# Patient Record
Sex: FEMALE | Race: WHITE | NOT HISPANIC OR LATINO | Employment: FULL TIME | URBAN - METROPOLITAN AREA
[De-identification: names, ages, dates, MRNs, and addresses within clinical notes are randomized per-mention and may not be internally consistent; named-entity substitution may affect disease eponyms.]

---

## 2018-07-19 ENCOUNTER — HOSPITAL ENCOUNTER (OUTPATIENT)
Dept: GENERAL RADIOLOGY | Facility: HOSPITAL | Age: 63
Discharge: HOME | End: 2018-07-19

## 2018-07-19 ENCOUNTER — HOSPITAL ENCOUNTER (OUTPATIENT)
Dept: GENERAL RADIOLOGY | Facility: HOSPITAL | Age: 63
Discharge: HOME | End: 2018-07-19
Attending: PAIN MEDICINE | Admitting: PAIN MEDICINE

## 2018-07-19 DIAGNOSIS — R52 PAIN: ICD-10-CM

## 2018-07-19 DIAGNOSIS — M54.5 LOW BACK PAIN, UNSPECIFIED BACK PAIN LATERALITY, UNSPECIFIED CHRONICITY, WITH SCIATICA PRESENCE UNSPECIFIED: ICD-10-CM

## 2018-07-19 PROCEDURE — 72220 X-RAY EXAM SACRUM TAILBONE: CPT

## 2018-07-19 PROCEDURE — 72114 X-RAY EXAM L-S SPINE BENDING: CPT

## 2021-03-15 ENCOUNTER — IMMUNIZATION (OUTPATIENT)
Dept: VACCINE CLINIC | Facility: HOSPITAL | Age: 66
End: 2021-03-15

## 2021-03-15 PROCEDURE — 91300 HC SARSCOV02 VAC 30MCG/0.3ML IM: CPT | Performed by: INTERNAL MEDICINE

## 2021-03-15 PROCEDURE — 0001A: CPT | Performed by: INTERNAL MEDICINE

## 2021-04-05 ENCOUNTER — IMMUNIZATION (OUTPATIENT)
Dept: VACCINE CLINIC | Facility: HOSPITAL | Age: 66
End: 2021-04-05

## 2021-04-05 PROCEDURE — 0002A: CPT | Performed by: INTERNAL MEDICINE

## 2021-04-05 PROCEDURE — 91300 HC SARSCOV02 VAC 30MCG/0.3ML IM: CPT | Performed by: INTERNAL MEDICINE

## 2025-02-04 ENCOUNTER — APPOINTMENT (OUTPATIENT)
Dept: CT IMAGING | Facility: HOSPITAL | Age: 70
DRG: 644 | End: 2025-02-04
Payer: MEDICARE

## 2025-02-04 ENCOUNTER — APPOINTMENT (OUTPATIENT)
Dept: GENERAL RADIOLOGY | Facility: HOSPITAL | Age: 70
DRG: 644 | End: 2025-02-04
Payer: MEDICARE

## 2025-02-04 ENCOUNTER — HOSPITAL ENCOUNTER (INPATIENT)
Facility: HOSPITAL | Age: 70
LOS: 7 days | Discharge: NURSING, REHAB OR LONG-TERM CARE | DRG: 644 | End: 2025-02-11
Attending: EMERGENCY MEDICINE | Admitting: STUDENT IN AN ORGANIZED HEALTH CARE EDUCATION/TRAINING PROGRAM
Payer: MEDICARE

## 2025-02-04 DIAGNOSIS — E03.9 HYPOTHYROIDISM, UNSPECIFIED TYPE: ICD-10-CM

## 2025-02-04 DIAGNOSIS — N17.9 AKI (ACUTE KIDNEY INJURY): Primary | ICD-10-CM

## 2025-02-04 DIAGNOSIS — I70.1 RENAL ARTERY STENOSIS: ICD-10-CM

## 2025-02-04 DIAGNOSIS — M79.89 LEG SWELLING: ICD-10-CM

## 2025-02-04 DIAGNOSIS — I73.9 PERIPHERAL ARTERIAL DISEASE: ICD-10-CM

## 2025-02-04 DIAGNOSIS — I10 HYPERTENSION, UNSPECIFIED TYPE: ICD-10-CM

## 2025-02-04 PROBLEM — R53.1 WEAKNESS: Status: ACTIVE | Noted: 2025-02-04

## 2025-02-04 PROBLEM — R60.0 LOWER EXTREMITY EDEMA: Status: ACTIVE | Noted: 2025-02-04

## 2025-02-04 PROBLEM — R79.89 ELEVATED TROPONIN: Status: ACTIVE | Noted: 2025-02-04

## 2025-02-04 PROBLEM — E66.9 OBESITY (BMI 30-39.9): Status: ACTIVE | Noted: 2025-02-04

## 2025-02-04 LAB
ALBUMIN SERPL-MCNC: 4.5 G/DL (ref 3.5–5.2)
ALBUMIN/GLOB SERPL: 1.2 G/DL
ALP SERPL-CCNC: 81 U/L (ref 39–117)
ALT SERPL W P-5'-P-CCNC: 11 U/L (ref 1–33)
ANION GAP SERPL CALCULATED.3IONS-SCNC: 11.3 MMOL/L (ref 5–15)
AST SERPL-CCNC: 27 U/L (ref 1–32)
BACTERIA UR QL AUTO: ABNORMAL /HPF
BASOPHILS # BLD AUTO: 0.01 10*3/MM3 (ref 0–0.2)
BASOPHILS NFR BLD AUTO: 0.2 % (ref 0–1.5)
BILIRUB SERPL-MCNC: 0.9 MG/DL (ref 0–1.2)
BILIRUB UR QL STRIP: NEGATIVE
BUN SERPL-MCNC: 28 MG/DL (ref 8–23)
BUN/CREAT SERPL: 14.4 (ref 7–25)
CALCIUM SPEC-SCNC: 9.6 MG/DL (ref 8.6–10.5)
CHLORIDE SERPL-SCNC: 103 MMOL/L (ref 98–107)
CLARITY UR: CLEAR
CO2 SERPL-SCNC: 26.7 MMOL/L (ref 22–29)
COLOR UR: YELLOW
CREAT SERPL-MCNC: 1.94 MG/DL (ref 0.57–1)
DEPRECATED RDW RBC AUTO: 55.4 FL (ref 37–54)
EGFRCR SERPLBLD CKD-EPI 2021: 27.4 ML/MIN/1.73
EOSINOPHIL # BLD AUTO: 0.16 10*3/MM3 (ref 0–0.4)
EOSINOPHIL NFR BLD AUTO: 3.1 % (ref 0.3–6.2)
ERYTHROCYTE [DISTWIDTH] IN BLOOD BY AUTOMATED COUNT: 16 % (ref 12.3–15.4)
GEN 5 1HR TROPONIN T REFLEX: 67 NG/L
GLOBULIN UR ELPH-MCNC: 3.7 GM/DL
GLUCOSE SERPL-MCNC: 102 MG/DL (ref 65–99)
GLUCOSE UR STRIP-MCNC: NEGATIVE MG/DL
HCT VFR BLD AUTO: 38.6 % (ref 34–46.6)
HGB BLD-MCNC: 12.1 G/DL (ref 12–15.9)
HGB UR QL STRIP.AUTO: ABNORMAL
HOLD SPECIMEN: NORMAL
HOLD SPECIMEN: NORMAL
HYALINE CASTS UR QL AUTO: ABNORMAL /LPF
IMM GRANULOCYTES # BLD AUTO: 0.04 10*3/MM3 (ref 0–0.05)
IMM GRANULOCYTES NFR BLD AUTO: 0.8 % (ref 0–0.5)
KETONES UR QL STRIP: NEGATIVE
LEUKOCYTE ESTERASE UR QL STRIP.AUTO: ABNORMAL
LYMPHOCYTES # BLD AUTO: 1.36 10*3/MM3 (ref 0.7–3.1)
LYMPHOCYTES NFR BLD AUTO: 26.6 % (ref 19.6–45.3)
MAGNESIUM SERPL-MCNC: 2.4 MG/DL (ref 1.6–2.4)
MCH RBC QN AUTO: 30 PG (ref 26.6–33)
MCHC RBC AUTO-ENTMCNC: 31.3 G/DL (ref 31.5–35.7)
MCV RBC AUTO: 95.5 FL (ref 79–97)
MONOCYTES # BLD AUTO: 0.27 10*3/MM3 (ref 0.1–0.9)
MONOCYTES NFR BLD AUTO: 5.3 % (ref 5–12)
NEUTROPHILS NFR BLD AUTO: 3.28 10*3/MM3 (ref 1.7–7)
NEUTROPHILS NFR BLD AUTO: 64 % (ref 42.7–76)
NITRITE UR QL STRIP: NEGATIVE
NRBC BLD AUTO-RTO: 0 /100 WBC (ref 0–0.2)
NT-PROBNP SERPL-MCNC: 261 PG/ML (ref 0–900)
PH UR STRIP.AUTO: 8 [PH] (ref 5–8)
PHOSPHATE SERPL-MCNC: 3 MG/DL (ref 2.5–4.5)
PLATELET # BLD AUTO: 205 10*3/MM3 (ref 140–450)
PMV BLD AUTO: 11.8 FL (ref 6–12)
POTASSIUM SERPL-SCNC: 4.6 MMOL/L (ref 3.5–5.2)
PROT SERPL-MCNC: 8.2 G/DL (ref 6–8.5)
PROT UR QL STRIP: ABNORMAL
QT INTERVAL: 449 MS
QTC INTERVAL: 455 MS
RBC # BLD AUTO: 4.04 10*6/MM3 (ref 3.77–5.28)
RBC # UR STRIP: ABNORMAL /HPF
REF LAB TEST METHOD: ABNORMAL
SODIUM SERPL-SCNC: 141 MMOL/L (ref 136–145)
SP GR UR STRIP: 1.01 (ref 1–1.03)
SQUAMOUS #/AREA URNS HPF: ABNORMAL /HPF
T4 FREE SERPL-MCNC: <0.1 NG/DL (ref 0.92–1.68)
TROPONIN T % DELTA: -13
TROPONIN T NUMERIC DELTA: -10 NG/L
TROPONIN T SERPL HS-MCNC: 77 NG/L
TSH SERPL DL<=0.05 MIU/L-ACNC: 92.6 UIU/ML (ref 0.27–4.2)
UROBILINOGEN UR QL STRIP: ABNORMAL
WBC # UR STRIP: ABNORMAL /HPF
WBC NRBC COR # BLD AUTO: 5.12 10*3/MM3 (ref 3.4–10.8)
WHOLE BLOOD HOLD COAG: NORMAL
WHOLE BLOOD HOLD SPECIMEN: NORMAL

## 2025-02-04 PROCEDURE — 81001 URINALYSIS AUTO W/SCOPE: CPT | Performed by: EMERGENCY MEDICINE

## 2025-02-04 PROCEDURE — 83880 ASSAY OF NATRIURETIC PEPTIDE: CPT | Performed by: EMERGENCY MEDICINE

## 2025-02-04 PROCEDURE — 80053 COMPREHEN METABOLIC PANEL: CPT | Performed by: EMERGENCY MEDICINE

## 2025-02-04 PROCEDURE — 36415 COLL VENOUS BLD VENIPUNCTURE: CPT

## 2025-02-04 PROCEDURE — 99285 EMERGENCY DEPT VISIT HI MDM: CPT

## 2025-02-04 PROCEDURE — 84443 ASSAY THYROID STIM HORMONE: CPT | Performed by: EMERGENCY MEDICINE

## 2025-02-04 PROCEDURE — P9612 CATHETERIZE FOR URINE SPEC: HCPCS

## 2025-02-04 PROCEDURE — 85025 COMPLETE CBC W/AUTO DIFF WBC: CPT | Performed by: EMERGENCY MEDICINE

## 2025-02-04 PROCEDURE — 25510000001 IOPAMIDOL PER 1 ML: Performed by: EMERGENCY MEDICINE

## 2025-02-04 PROCEDURE — 25010000002 HYDRALAZINE PER 20 MG: Performed by: EMERGENCY MEDICINE

## 2025-02-04 PROCEDURE — 71045 X-RAY EXAM CHEST 1 VIEW: CPT

## 2025-02-04 PROCEDURE — 93005 ELECTROCARDIOGRAM TRACING: CPT | Performed by: EMERGENCY MEDICINE

## 2025-02-04 PROCEDURE — 93010 ELECTROCARDIOGRAM REPORT: CPT | Performed by: INTERNAL MEDICINE

## 2025-02-04 PROCEDURE — 84100 ASSAY OF PHOSPHORUS: CPT | Performed by: EMERGENCY MEDICINE

## 2025-02-04 PROCEDURE — 84439 ASSAY OF FREE THYROXINE: CPT | Performed by: EMERGENCY MEDICINE

## 2025-02-04 PROCEDURE — 83735 ASSAY OF MAGNESIUM: CPT | Performed by: EMERGENCY MEDICINE

## 2025-02-04 PROCEDURE — 70450 CT HEAD/BRAIN W/O DYE: CPT

## 2025-02-04 PROCEDURE — 75635 CT ANGIO ABDOMINAL ARTERIES: CPT

## 2025-02-04 PROCEDURE — 84484 ASSAY OF TROPONIN QUANT: CPT | Performed by: EMERGENCY MEDICINE

## 2025-02-04 RX ORDER — LEVOTHYROXINE SODIUM 25 UG/1
TABLET ORAL
Status: CANCELLED | OUTPATIENT
Start: 2025-02-05

## 2025-02-04 RX ORDER — LEVOTHYROXINE SODIUM 50 UG/1
50 TABLET ORAL
Status: DISCONTINUED | OUTPATIENT
Start: 2025-02-04 | End: 2025-02-05

## 2025-02-04 RX ORDER — SODIUM CHLORIDE 9 MG/ML
40 INJECTION, SOLUTION INTRAVENOUS AS NEEDED
Status: DISCONTINUED | OUTPATIENT
Start: 2025-02-04 | End: 2025-02-11 | Stop reason: HOSPADM

## 2025-02-04 RX ORDER — ACETAMINOPHEN 650 MG/1
650 SUPPOSITORY RECTAL EVERY 4 HOURS PRN
Status: DISCONTINUED | OUTPATIENT
Start: 2025-02-04 | End: 2025-02-11 | Stop reason: HOSPADM

## 2025-02-04 RX ORDER — AMOXICILLIN 250 MG
2 CAPSULE ORAL 2 TIMES DAILY PRN
Status: DISCONTINUED | OUTPATIENT
Start: 2025-02-04 | End: 2025-02-09

## 2025-02-04 RX ORDER — BISACODYL 10 MG
10 SUPPOSITORY, RECTAL RECTAL DAILY PRN
Status: DISCONTINUED | OUTPATIENT
Start: 2025-02-04 | End: 2025-02-09

## 2025-02-04 RX ORDER — ACETAMINOPHEN 160 MG/5ML
650 SOLUTION ORAL EVERY 4 HOURS PRN
Status: DISCONTINUED | OUTPATIENT
Start: 2025-02-04 | End: 2025-02-11 | Stop reason: HOSPADM

## 2025-02-04 RX ORDER — BISACODYL 5 MG/1
5 TABLET, DELAYED RELEASE ORAL DAILY PRN
Status: DISCONTINUED | OUTPATIENT
Start: 2025-02-04 | End: 2025-02-09

## 2025-02-04 RX ORDER — IOPAMIDOL 755 MG/ML
100 INJECTION, SOLUTION INTRAVASCULAR
Status: COMPLETED | OUTPATIENT
Start: 2025-02-04 | End: 2025-02-04

## 2025-02-04 RX ORDER — ONDANSETRON 2 MG/ML
4 INJECTION INTRAMUSCULAR; INTRAVENOUS EVERY 6 HOURS PRN
Status: DISCONTINUED | OUTPATIENT
Start: 2025-02-04 | End: 2025-02-11 | Stop reason: HOSPADM

## 2025-02-04 RX ORDER — ONDANSETRON 4 MG/1
4 TABLET, ORALLY DISINTEGRATING ORAL EVERY 6 HOURS PRN
Status: DISCONTINUED | OUTPATIENT
Start: 2025-02-04 | End: 2025-02-11 | Stop reason: HOSPADM

## 2025-02-04 RX ORDER — HYDRALAZINE HYDROCHLORIDE 20 MG/ML
10 INJECTION INTRAMUSCULAR; INTRAVENOUS ONCE
Status: COMPLETED | OUTPATIENT
Start: 2025-02-04 | End: 2025-02-04

## 2025-02-04 RX ORDER — SODIUM CHLORIDE 0.9 % (FLUSH) 0.9 %
10 SYRINGE (ML) INJECTION EVERY 12 HOURS SCHEDULED
Status: DISCONTINUED | OUTPATIENT
Start: 2025-02-04 | End: 2025-02-11 | Stop reason: HOSPADM

## 2025-02-04 RX ORDER — POLYETHYLENE GLYCOL 3350 17 G/17G
17 POWDER, FOR SOLUTION ORAL DAILY PRN
Status: DISCONTINUED | OUTPATIENT
Start: 2025-02-04 | End: 2025-02-09

## 2025-02-04 RX ORDER — ACETAMINOPHEN 325 MG/1
650 TABLET ORAL EVERY 4 HOURS PRN
Status: DISCONTINUED | OUTPATIENT
Start: 2025-02-04 | End: 2025-02-11 | Stop reason: HOSPADM

## 2025-02-04 RX ORDER — SODIUM CHLORIDE 0.9 % (FLUSH) 0.9 %
10 SYRINGE (ML) INJECTION AS NEEDED
Status: DISCONTINUED | OUTPATIENT
Start: 2025-02-04 | End: 2025-02-11 | Stop reason: HOSPADM

## 2025-02-04 RX ADMIN — HYDRALAZINE HYDROCHLORIDE 10 MG: 20 INJECTION INTRAMUSCULAR; INTRAVENOUS at 15:31

## 2025-02-04 RX ADMIN — IOPAMIDOL 95 ML: 755 INJECTION, SOLUTION INTRAVENOUS at 14:58

## 2025-02-04 RX ADMIN — LEVOTHYROXINE SODIUM 50 MCG: 50 TABLET ORAL at 21:56

## 2025-02-04 NOTE — ED PROVIDER NOTES
EMERGENCY DEPARTMENT ENCOUNTER  Room Number:  13/13  PCP: Provider, No Known  Independent Historians: Patient and Family  Date of encounter:  2/4/2025  Patient Care Team:  Provider, No Known as PCP - General     HPI:  Chief Complaint: had concerns including Weakness - Generalized.     A complete HPI/ROS/PMH/PSH/SH/FH are unobtainable due to: None    Chronic or social conditions impacting patient care (Social Determinants of Health): None    Context: Rika Millan is a 69 y.o. female with no known medical history who presents to the ED c/o acute weakness and urinary incontinence.  Patient has had approximately 2 years of worsening urinary incontinence.  She states that it has been nearly constant dribble.  She has the urge to go.  She denies any dysuria or change in urinary character.  She is also had recurrent falls at home due to generalized weakness.  She is also had bilateral leg swelling that has been ongoing for 2 years.  Patient has not seen a doctor in several years and does not have a PCP.  She has no known or diagnosed medical problems.  She denies chest pain or shortness of breath.  She has normal p.o. intake at home.  No nausea, vomiting, diarrhea, abdominal pain, abdominal distention.  No leg pain noted.    Review of prior external notes (non-ED) -and- Review of prior external test results outside of this encounter: Extensive review of the EPIC system as well as [facility] reveals no prior visit notes and no prior diagnostic studies available for review.    PAST MEDICAL HISTORY  Active Ambulatory Problems     Diagnosis Date Noted    No Active Ambulatory Problems     Resolved Ambulatory Problems     Diagnosis Date Noted    No Resolved Ambulatory Problems     No Additional Past Medical History       PAST SURGICAL HISTORY  No past surgical history on file.    FAMILY HISTORY  No family history on file.    SOCIAL HISTORY  Social History     Socioeconomic History    Marital status:         ALLERGIES  Codeine    REVIEW OF SYSTEMS  Review of Systems  Included in HPI  All systems reviewed and negative except for those discussed in HPI.    PHYSICAL EXAM    I have reviewed the triage vital signs and nursing notes.    ED Triage Vitals   Temp Heart Rate Resp BP SpO2   02/04/25 1153 02/04/25 1153 02/04/25 1153 02/04/25 1211 02/04/25 1153   97.6 °F (36.4 °C) 67 18 (!) 188/106 96 %      Temp src Heart Rate Source Patient Position BP Location FiO2 (%)   02/04/25 1153 02/04/25 1153 -- 02/04/25 1211 --   Tympanic Monitor  Right arm        Physical Exam  Constitutional:       General: She is not in acute distress.     Appearance: Normal appearance. She is ill-appearing. She is not toxic-appearing.   HENT:      Head: Normocephalic and atraumatic.      Nose: Nose normal.      Mouth/Throat:      Mouth: Mucous membranes are dry.      Pharynx: Oropharynx is clear.   Eyes:      Extraocular Movements: Extraocular movements intact.      Conjunctiva/sclera: Conjunctivae normal.      Pupils: Pupils are equal, round, and reactive to light.   Cardiovascular:      Rate and Rhythm: Normal rate and regular rhythm.      Pulses: Normal pulses.      Heart sounds: Normal heart sounds. No murmur heard.     No friction rub. No gallop.   Pulmonary:      Effort: Pulmonary effort is normal. No respiratory distress.      Breath sounds: Normal breath sounds. No stridor. No wheezing, rhonchi or rales.   Abdominal:      General: Abdomen is flat. There is no distension.      Palpations: Abdomen is soft.      Tenderness: There is no abdominal tenderness. There is no guarding or rebound.   Musculoskeletal:      Cervical back: Normal range of motion and neck supple.      Right lower leg: Edema present.      Left lower leg: Edema present.      Comments: Plus pitting edema to the distal thigh bilaterally, there is some chronic erythema without induration, no palpable fluctuance, some blistering of the skin without spontaneous rupture, left  foot is significantly cooler than the right, unable to Doppler bilateral PT pulses but and able to Doppler DP pulses   Skin:     General: Skin is warm and dry.   Neurological:      General: No focal deficit present.      Mental Status: She is alert and oriented to person, place, and time. Mental status is at baseline.      Comments: EOMI, no facial droop, normal sensation to both sides of face, no dysarthria, no aphasia, 5/5 strength in upper extremities, normal sensation bilateral upper extremities, 5/5 strength in bilateral lower extremities, normal sensation bilateral lower extremities, normal coordination   Psychiatric:         Mood and Affect: Mood normal.         Behavior: Behavior normal.         Thought Content: Thought content normal.         Judgment: Judgment normal.         LAB RESULTS  Recent Results (from the past 24 hours)   Green Top (Gel)    Collection Time: 02/04/25 12:18 PM   Result Value Ref Range    Extra Tube Hold for add-ons.    Lavender Top    Collection Time: 02/04/25 12:18 PM   Result Value Ref Range    Extra Tube hold for add-on    Gold Top - SST    Collection Time: 02/04/25 12:18 PM   Result Value Ref Range    Extra Tube Hold for add-ons.    Light Blue Top    Collection Time: 02/04/25 12:18 PM   Result Value Ref Range    Extra Tube Hold for add-ons.        RADIOLOGY  No Radiology Exams Resulted Within Past 24 Hours    MEDICATIONS GIVEN IN ER  Medications - No data to display    ORDERS PLACED DURING THIS VISIT:  Orders Placed This Encounter   Procedures    XR Chest 1 View    CT Head Without Contrast    CT Angio Abdominal Aorta Bilateral Iliofem Runoff    [place] Draw    Comprehensive Metabolic Panel    BNP    High Sensitivity Troponin T    Magnesium    Phosphorus    TSH Rfx On Abnormal To Free T4    Urinalysis With Microscopic If Indicated (No Culture) - Urine, Catheter    ECG 12 Lead Altered Mental Status    Green Top (Gel)    Lavender Top    Gold Top - SST    Light Blue Top    CBC &  Differential       OUTPATIENT MEDICATION MANAGEMENT:  No current Epic-ordered facility-administered medications on file.     No current Epic-ordered outpatient medications on file.       PROCEDURES  Procedures    PROGRESS, DATA ANALYSIS, CONSULTS, AND MEDICAL DECISION MAKING  All labs have been independently interpreted by me.  All radiology studies have been reviewed by me. All EKG's have been independently viewed and interpreted by me.  Discussion below represents my analysis of pertinent findings related to patient's condition, differential diagnosis, treatment plan and final disposition.    Differential diagnosis includes but is not limited to renal failure, CHF, UTI, peripheral vascular disease, electrolyte derangement.    Clinical Scores:                   ED Course as of 02/05/25 1016   Tue Feb 04, 2025   1408 EKG interpreted by myself  Time: 1406  Rate: 62  Rhythm: NSR  No ST elevation or depression  Normal intervals  Normal morphology [AB]   1420 Urinalysis With Microscopic If Indicated (No Culture) - Urine, Catheter(!) [AB]   1420 Creatinine(!): 1.94 [AB]   1420 XR Chest 1 View  My independent interpretation of the imaging study is no pulmonary edema [AB]   1439 HS Troponin T(!!): 77 [AB]   1439 TSH Baseline(!): 92.600 [AB]   1504 CT Head Without Contrast  My independent interpretation of the imaging study is no ICH [AB]   1505 Care transferred to NIDHI Gallardo, pending completion of workup and admission. [AB]   1528 I discussed patient with Dr. Alfred, radiologist.  CT head demonstrates diffuse cerebral atrophy, significant small vessel disease, some ventricular enlargement likely consistent with atrophy and not NPH [KA]   1623 I discussed patient with Dr. Cordon, hospitalist.  Discussed patient's history presentation workup and she agrees to admit for further evaluation treatment [KA]   1630 I reassessed the patient, she is in no distress.  With her permission I counseled her and her son at the bedside  and all of her lab and imaging results and importance of admission for further evaluation and treatment and she is agreeable. [KA]   1730 HS Troponin T(!!): 67 [KA]      ED Course User Index  [AB] Sae Vizcaino MD  [KA] Falguni Marie PA-C             AS OF 13:48 EST VITALS:    BP - (!) 188/106  HR - 67  TEMP - 97.6 °F (36.4 °C) (Tympanic)  O2 SATS - 96%    COMPLEXITY OF CARE  The patient requires admission.      DIAGNOSIS  Final diagnoses:   JUSTIN (acute kidney injury)   Leg swelling   Hypertension, unspecified type   Hypothyroidism, unspecified type   Peripheral arterial disease   Renal artery stenosis         DISPOSITION  ED Disposition       ED Disposition   Intended Admit    Condition   --    Comment   --                Please note that portions of this document were completed with a voice recognition program.    Note Disclaimer: At [facility], we believe that sharing information builds trust and better relationships. You are receiving this note because you recently visited [facility]. It is possible you will see health information before a provider has talked with you about it. This kind of information can be easy to misunderstand. To help you fully understand what it means for your health, we urge you to discuss this note with your provider.         Sae Vizcaino MD  02/05/25 1012

## 2025-02-04 NOTE — ED NOTES
Pt to ed for multiple complaints (urinary incont, generalized weakness). Pt does not take any medications, pt does not have a PCP and has not been seen by a provider in years. Pt alert and oriented x 4. Pt stated she has to use a wheelchair due to weakness in legs.

## 2025-02-04 NOTE — ED NOTES
Nursing report ED to floor  Rika Millan  69 y.o.  female    HPI :  HPI  Stated Reason for Visit: generalized weakness and urinary incont x several months  History Obtained From: patient  Duration (Days):  (several months)    Chief Complaint  Chief Complaint   Patient presents with    Weakness - Generalized       Admitting doctor:   Pamela Cordon MD    Admitting diagnosis:   The primary encounter diagnosis was JUSTIN (acute kidney injury). Diagnoses of Leg swelling, Hypertension, unspecified type, Hypothyroidism, unspecified type, Peripheral arterial disease, and Renal artery stenosis were also pertinent to this visit.    Code status:   Current Code Status       Date Active Code Status Order ID Comments User Context       Not on file            Allergies:   Codeine    Isolation:   No active isolations    Intake and Output    Intake/Output Summary (Last 24 hours) at 2/4/2025 1655  Last data filed at 2/4/2025 1619  Gross per 24 hour   Intake --   Output 1400 ml   Net -1400 ml       Weight:   There were no vitals filed for this visit.    Most recent vitals:   Vitals:    02/04/25 1431 02/04/25 1508 02/04/25 1516 02/04/25 1601   BP: 108/47 (!) 195/95 (!) 190/109 (!) 182/107   BP Location:       Pulse: 57 62 60 58   Resp:  18 18 18   Temp:       TempSrc:       SpO2: 93% 99% 100% 99%       Active LDAs/IV Access:   Lines, Drains & Airways       Active LDAs       Name Placement date Placement time Site Days    Peripheral IV 02/04/25 1346 Right Antecubital 02/04/25  1346  Antecubital  less than 1    External Urinary Catheter 02/04/25  1354  --  less than 1                    Labs (abnormal labs have a star):   Labs Reviewed   COMPREHENSIVE METABOLIC PANEL - Abnormal; Notable for the following components:       Result Value    Glucose 102 (*)     BUN 28 (*)     Creatinine 1.94 (*)     eGFR 27.4 (*)     All other components within normal limits    Narrative:     GFR Categories in Chronic Kidney Disease (CKD)      GFR  Category          GFR (mL/min/1.73)    Interpretation  G1                     90 or greater         Normal or high (1)  G2                      60-89                Mild decrease (1)  G3a                   45-59                Mild to moderate decrease  G3b                   30-44                Moderate to severe decrease  G4                    15-29                Severe decrease  G5                    14 or less           Kidney failure          (1)In the absence of evidence of kidney disease, neither GFR category G1 or G2 fulfill the criteria for CKD.    eGFR calculation 2021 CKD-EPI creatinine equation, which does not include race as a factor   TROPONIN - Abnormal; Notable for the following components:    HS Troponin T 77 (*)     All other components within normal limits    Narrative:     High Sensitive Troponin T Reference Range:  <14.0 ng/L- Negative Female for AMI  <22.0 ng/L- Negative Male for AMI  >=14 - Abnormal Female indicating possible myocardial injury.  >=22 - Abnormal Male indicating possible myocardial injury.   Clinicians would have to utilize clinical acumen, EKG, Troponin, and serial changes to determine if it is an Acute Myocardial Infarction or myocardial injury due to an underlying chronic condition.        TSH RFX ON ABNORMAL TO FREE T4 - Abnormal; Notable for the following components:    TSH 92.600 (*)     All other components within normal limits   URINALYSIS W/ MICROSCOPIC IF INDICATED (NO CULTURE) - Abnormal; Notable for the following components:    Blood, UA Small (1+) (*)     Protein, UA 30 mg/dL (1+) (*)     Leuk Esterase, UA Small (1+) (*)     All other components within normal limits   CBC WITH AUTO DIFFERENTIAL - Abnormal; Notable for the following components:    MCHC 31.3 (*)     RDW 16.0 (*)     RDW-SD 55.4 (*)     Immature Grans % 0.8 (*)     All other components within normal limits   URINALYSIS, MICROSCOPIC ONLY - Abnormal; Notable for the following components:    WBC, UA  11-20 (*)     Bacteria, UA Trace (*)     All other components within normal limits   T4, FREE - Abnormal; Notable for the following components:    Free T4 <0.10 (*)     All other components within normal limits   HIGH SENSITIVITIY TROPONIN T 1HR - Abnormal; Notable for the following components:    HS Troponin T 67 (*)     All other components within normal limits    Narrative:     High Sensitive Troponin T Reference Range:  <14.0 ng/L- Negative Female for AMI  <22.0 ng/L- Negative Male for AMI  >=14 - Abnormal Female indicating possible myocardial injury.  >=22 - Abnormal Male indicating possible myocardial injury.   Clinicians would have to utilize clinical acumen, EKG, Troponin, and serial changes to determine if it is an Acute Myocardial Infarction or myocardial injury due to an underlying chronic condition.        BNP (IN-HOUSE) - Normal    Narrative:     This assay is used as an aid in the diagnosis of individuals suspected of having heart failure. It can be used as an aid in the diagnosis of acute decompensated heart failure (ADHF) in patients presenting with signs and symptoms of ADHF to the emergency department (ED). In addition, NT-proBNP of <300 pg/mL indicates ADHF is not likely.    Age Range Result Interpretation  NT-proBNP Concentration (pg/mL:      <50             Positive            >450                   Gray                 300-450                    Negative             <300    50-75           Positive            >900                  Gray                300-900                  Negative            <300      >75             Positive            >1800                  Gray                300-1800                  Negative            <300   MAGNESIUM - Normal   PHOSPHORUS - Normal   RAINBOW DRAW    Narrative:     The following orders were created for panel order [place] Draw.  Procedure                               Abnormality         Status                     ---------                                -----------         ------                     Green Top (Gel)[[id number]]                                  Final result               Lavender Top[[id number]]                                     Final result               Gold Top - SST[[id number]]                                   Final result               Light Blue Top[[id number]]                                   Final result                 Please view results for these tests on the individual orders.   GREEN TOP   LAVENDER TOP   GOLD TOP - SST   LIGHT BLUE TOP   CBC AND DIFFERENTIAL    Narrative:     The following orders were created for panel order CBC & Differential.  Procedure                               Abnormality         Status                     ---------                               -----------         ------                     CBC Auto Differential[[id number]]        Abnormal            Final result                 Please view results for these tests on the individual orders.       EKG:   ECG 12 Lead Altered Mental Status   Preliminary Result   HEART RATE=62  bpm   RR Yhxvzhzl=257  ms   MT Llytahnp=946  ms   P Horizontal Axis=47  deg   P Front Axis=1  deg   QRSD Interval=81  ms   QT Hqtxvogx=216  ms   ENhX=549  ms   QRS Axis=20  deg   T Wave Axis=62  deg   - BORDERLINE ECG -   Sinus rhythm   Borderline T wave abnormalities   Date and Time of Study:2025-02-04 14:06:25          Meds given in ED:   Medications   iopamidol (ISOVUE-370) 76 % injection 100 mL (95 mL Intravenous Given by Other 2/4/25 6851)   hydrALAZINE (APRESOLINE) injection 10 mg (10 mg Intravenous Given 2/4/25 1531)       Imaging results:  CT Head Without Contrast    Result Date: 2/4/2025  1. No convincing acute intracranial abnormality is identified.  2. There is extensive confluent low-density throughout the cerebral white matter that is most probably a manifestation of severe small vessel disease. There is diffuse cerebral atrophy. The lateral and third ventricles are prominent in  size and this is most probably on the basis of central volume loss or atrophy rather than NPH and correlate clinically. There are lens implants in the globes from previous bilateral cataract surgery. The remainder of the head CT is normal.  Radiation dose reduction techniques were utilized, including automated exposure control and exposure modulation based on body size.       CT Angio Abdominal Aorta Bilateral Iliofem Runoff    Result Date: 2/4/2025  1. There is no significant aortoiliac inflow stenosis 2. There is severe stenoses within the right popliteal artery. Dominant runoff on the right is supplied by the posterior tibial artery 3. Moderate stenosis of the mid left popliteal artery. Dominant runoff on the left supplied by the peroneal artery 4. Severe stenosis at the origin of the right renal artery 5. Additional findings as described    Radiation dose reduction techniques were utilized, including automated exposure control and exposure modulation based on body size.   This report was finalized on 2/4/2025 3:28 PM by Dr. Nate Gamble M.D on Workstation: [id number]      XR Chest 1 View    Result Date: 2/4/2025  1. Mild cardiomegaly.   This report was finalized on 2/4/2025 2:23 PM by Dr. Ector Monahan M.D on Workstation: [id number]       Ambulatory status:   - assist    Social issues:   Social History     Socioeconomic History    Marital status:    Substance and Sexual Activity    Alcohol use: Not Currently    Drug use: Never       Peripheral Neurovascular  Peripheral Neurovascular (Adult)  Peripheral Neurovascular WDL: WDL  Additional Documentation: Edema (Group)  Edema  Edema: leg, left, leg, right, foot, left, foot, right  Leg, Left Edema: 4+ (Severe)  Leg, Right Edema: 4+ (Severe)  Foot, Left Edema: 4+ (Severe)  Foot, Right Edema: 4+ (Severe)    Neuro Cognitive  Neuro Cognitive (Adult)  Cognitive/Neuro/Behavioral WDL: WDL    Learning  Learning Assessment  Learning Readiness and Ability:  psychosocial barrier noted, physical limitation noted    Respiratory  Respiratory  Airway WDL: WDL  Respiratory WDL  Respiratory WDL: WDL    Abdominal Pain       Pain Assessments  Pain (Adult)  (0-10) Pain Rating: Rest: 0  (0-10) Pain Rating: Activity: 0    NIH Stroke Scale       Aretha Wilson RN  02/04/25 16:55 EST

## 2025-02-04 NOTE — PROGRESS NOTES
Clinical Pharmacy Services: Medication History    Rika Millan is a 69 y.o. female presenting to [facility] for   Chief Complaint   Patient presents with    Weakness - Generalized       She  has no past medical history on file.    Allergies as of 02/04/2025 - Reviewed 02/04/2025   Allergen Reaction Noted    Codeine Nausea Only 02/04/2025       Medication information was obtained from: Patient   Pharmacy and Phone Number:     Prior to Admission Medications       None              Medication notes:     This medication list is complete to the best of my knowledge as of 2/4/2025    Please call if questions.    Luis Whitman  Medication History Technician  [phone]    2/4/2025 15:03 EST

## 2025-02-05 ENCOUNTER — APPOINTMENT (OUTPATIENT)
Dept: CARDIOLOGY | Facility: HOSPITAL | Age: 70
DRG: 644 | End: 2025-02-05
Payer: MEDICARE

## 2025-02-05 LAB
ANION GAP SERPL CALCULATED.3IONS-SCNC: 7.8 MMOL/L (ref 5–15)
AORTIC DIMENSIONLESS INDEX: 0.74 (DI)
ASCENDING AORTA: 3 CM
AV MEAN PRESS GRAD SYS DOP V1V2: 3 MMHG
AV VMAX SYS DOP: 117 CM/SEC
BASOPHILS # BLD AUTO: 0.01 10*3/MM3 (ref 0–0.2)
BASOPHILS NFR BLD AUTO: 0.2 % (ref 0–1.5)
BH CV ECHO MEAS - AO MAX PG: 5.5 MMHG
BH CV ECHO MEAS - AO ROOT DIAM: 3.2 CM
BH CV ECHO MEAS - AO V2 VTI: 25.4 CM
BH CV ECHO MEAS - AVA(I,D): 2.5 CM2
BH CV ECHO MEAS - EDV(CUBED): 98 ML
BH CV ECHO MEAS - EDV(MOD-SP2): 80 ML
BH CV ECHO MEAS - EDV(MOD-SP4): 102 ML
BH CV ECHO MEAS - EF(MOD-SP2): 63.7 %
BH CV ECHO MEAS - EF(MOD-SP4): 61.8 %
BH CV ECHO MEAS - ESV(CUBED): 34.7 ML
BH CV ECHO MEAS - ESV(MOD-SP2): 29 ML
BH CV ECHO MEAS - ESV(MOD-SP4): 39 ML
BH CV ECHO MEAS - FS: 29.2 %
BH CV ECHO MEAS - IVS/LVPW: 0.98 CM
BH CV ECHO MEAS - IVSD: 1.4 CM
BH CV ECHO MEAS - LAT PEAK E' VEL: 4.7 CM/SEC
BH CV ECHO MEAS - LV MASS(C)D: 263.1 GRAMS
BH CV ECHO MEAS - LV MAX PG: 3.3 MMHG
BH CV ECHO MEAS - LV MEAN PG: 2 MMHG
BH CV ECHO MEAS - LV V1 MAX: 90.8 CM/SEC
BH CV ECHO MEAS - LV V1 VTI: 18.8 CM
BH CV ECHO MEAS - LVIDD: 4.6 CM
BH CV ECHO MEAS - LVIDS: 3.3 CM
BH CV ECHO MEAS - LVOT AREA: 3.4 CM2
BH CV ECHO MEAS - LVOT DIAM: 2.08 CM
BH CV ECHO MEAS - LVPWD: 1.44 CM
BH CV ECHO MEAS - MED PEAK E' VEL: 5 CM/SEC
BH CV ECHO MEAS - MV A DUR: 0.14 SEC
BH CV ECHO MEAS - MV A MAX VEL: 81.4 CM/SEC
BH CV ECHO MEAS - MV DEC SLOPE: 142.1 CM/SEC2
BH CV ECHO MEAS - MV DEC TIME: 0.3 SEC
BH CV ECHO MEAS - MV E MAX VEL: 57 CM/SEC
BH CV ECHO MEAS - MV E/A: 0.7
BH CV ECHO MEAS - MV MAX PG: 2.8 MMHG
BH CV ECHO MEAS - MV MEAN PG: 0.82 MMHG
BH CV ECHO MEAS - MV P1/2T: 144.4 MSEC
BH CV ECHO MEAS - MV V2 VTI: 28.7 CM
BH CV ECHO MEAS - MVA(P1/2T): 1.52 CM2
BH CV ECHO MEAS - MVA(VTI): 2.23 CM2
BH CV ECHO MEAS - PA ACC TIME: 0.1 SEC
BH CV ECHO MEAS - PA V2 MAX: 83 CM/SEC
BH CV ECHO MEAS - RV MAX PG: 1.26 MMHG
BH CV ECHO MEAS - RV V1 MAX: 56.1 CM/SEC
BH CV ECHO MEAS - RV V1 VTI: 11.5 CM
BH CV ECHO MEAS - SV(LVOT): 64 ML
BH CV ECHO MEAS - SV(MOD-SP2): 51 ML
BH CV ECHO MEAS - SV(MOD-SP4): 63 ML
BH CV ECHO MEAS - TAPSE (>1.6): 1.94 CM
BH CV ECHO MEASUREMENTS AVERAGE E/E' RATIO: 11.75
BH CV LOWER ARTERIAL LEFT ABI RATIO: 0.85
BH CV LOWER ARTERIAL LEFT DORSALIS PEDIS SYS MAX: 148
BH CV LOWER ARTERIAL LEFT POST TIBIAL SYS MAX: 127
BH CV LOWER ARTERIAL LEFT TBI RATIO: 0.53
BH CV LOWER ARTERIAL RIGHT ABI RATIO: 0.83
BH CV LOWER ARTERIAL RIGHT DORSALIS PEDIS SYS MAX: 128
BH CV LOWER ARTERIAL RIGHT POST TIBIAL SYS MAX: 145
BH CV LOWER ARTERIAL RIGHT TBI RATIO: 0.42
BH CV XLRA - TDI S': 8.7 CM/SEC
BH CV XLRA MEAS LEFT DIST CCA EDV: 16.9 CM/SEC
BH CV XLRA MEAS LEFT DIST CCA PSV: 47.4 CM/SEC
BH CV XLRA MEAS LEFT DIST ICA EDV: 9.5 CM/SEC
BH CV XLRA MEAS LEFT DIST ICA PSV: 43.4 CM/SEC
BH CV XLRA MEAS LEFT ICA/CCA RATIO: 1.36
BH CV XLRA MEAS LEFT MID ICA EDV: -22.6 CM/SEC
BH CV XLRA MEAS LEFT MID ICA PSV: -60 CM/SEC
BH CV XLRA MEAS LEFT PROX CCA EDV: 13.7 CM/SEC
BH CV XLRA MEAS LEFT PROX CCA PSV: 32.4 CM/SEC
BH CV XLRA MEAS LEFT PROX ECA EDV: 85.1 CM/SEC
BH CV XLRA MEAS LEFT PROX ECA PSV: 345.7 CM/SEC
BH CV XLRA MEAS LEFT PROX ICA EDV: -23.6 CM/SEC
BH CV XLRA MEAS LEFT PROX ICA PSV: -64.6 CM/SEC
BH CV XLRA MEAS LEFT PROX SCLA PSV: 122.4 CM/SEC
BH CV XLRA MEAS LEFT VERTEBRAL A EDV: 9.3 CM/SEC
BH CV XLRA MEAS LEFT VERTEBRAL A PSV: 34.9 CM/SEC
BH CV XLRA MEAS RIGHT DIST CCA EDV: 11.9 CM/SEC
BH CV XLRA MEAS RIGHT DIST CCA PSV: 62.8 CM/SEC
BH CV XLRA MEAS RIGHT DIST ICA EDV: -17.4 CM/SEC
BH CV XLRA MEAS RIGHT DIST ICA PSV: -62.5 CM/SEC
BH CV XLRA MEAS RIGHT ICA/CCA RATIO: 1.06
BH CV XLRA MEAS RIGHT MID ICA EDV: 19.2 CM/SEC
BH CV XLRA MEAS RIGHT MID ICA PSV: 56 CM/SEC
BH CV XLRA MEAS RIGHT PROX CCA EDV: 10.6 CM/SEC
BH CV XLRA MEAS RIGHT PROX CCA PSV: 48.9 CM/SEC
BH CV XLRA MEAS RIGHT PROX ECA EDV: -14.9 CM/SEC
BH CV XLRA MEAS RIGHT PROX ECA PSV: -119.9 CM/SEC
BH CV XLRA MEAS RIGHT PROX ICA EDV: -11.5 CM/SEC
BH CV XLRA MEAS RIGHT PROX ICA PSV: -66.4 CM/SEC
BH CV XLRA MEAS RIGHT PROX SCLA PSV: 115.2 CM/SEC
BH CV XLRA MEAS RIGHT VERTEBRAL A EDV: 8.5 CM/SEC
BH CV XLRA MEAS RIGHT VERTEBRAL A PSV: 32 CM/SEC
BUN SERPL-MCNC: 23 MG/DL (ref 8–23)
BUN/CREAT SERPL: 12.9 (ref 7–25)
CALCIUM SPEC-SCNC: 8.9 MG/DL (ref 8.6–10.5)
CHLORIDE SERPL-SCNC: 105 MMOL/L (ref 98–107)
CO2 SERPL-SCNC: 27.2 MMOL/L (ref 22–29)
CORTIS SERPL-MCNC: 10.5 MCG/DL
CREAT SERPL-MCNC: 1.78 MG/DL (ref 0.57–1)
DEPRECATED RDW RBC AUTO: 53.5 FL (ref 37–54)
EGFRCR SERPLBLD CKD-EPI 2021: 30.4 ML/MIN/1.73
EOSINOPHIL # BLD AUTO: 0.12 10*3/MM3 (ref 0–0.4)
EOSINOPHIL NFR BLD AUTO: 1.9 % (ref 0.3–6.2)
ERYTHROCYTE [DISTWIDTH] IN BLOOD BY AUTOMATED COUNT: 15.7 % (ref 12.3–15.4)
GLUCOSE SERPL-MCNC: 122 MG/DL (ref 65–99)
HCT VFR BLD AUTO: 37.4 % (ref 34–46.6)
HGB BLD-MCNC: 12.6 G/DL (ref 12–15.9)
IMM GRANULOCYTES # BLD AUTO: 0.04 10*3/MM3 (ref 0–0.05)
IMM GRANULOCYTES NFR BLD AUTO: 0.6 % (ref 0–0.5)
LEFT ATRIUM VOLUME INDEX: 13.8 ML/M2
LV EF BIPLANE MOD: 61.8 %
LYMPHOCYTES # BLD AUTO: 1.17 10*3/MM3 (ref 0.7–3.1)
LYMPHOCYTES NFR BLD AUTO: 18.5 % (ref 19.6–45.3)
MAGNESIUM SERPL-MCNC: 2.5 MG/DL (ref 1.6–2.4)
MCH RBC QN AUTO: 31.3 PG (ref 26.6–33)
MCHC RBC AUTO-ENTMCNC: 33.7 G/DL (ref 31.5–35.7)
MCV RBC AUTO: 93 FL (ref 79–97)
MONOCYTES # BLD AUTO: 0.34 10*3/MM3 (ref 0.1–0.9)
MONOCYTES NFR BLD AUTO: 5.4 % (ref 5–12)
NEUTROPHILS NFR BLD AUTO: 4.66 10*3/MM3 (ref 1.7–7)
NEUTROPHILS NFR BLD AUTO: 73.4 % (ref 42.7–76)
NRBC BLD AUTO-RTO: 0 /100 WBC (ref 0–0.2)
PHOSPHATE SERPL-MCNC: 2.5 MG/DL (ref 2.5–4.5)
PLATELET # BLD AUTO: 196 10*3/MM3 (ref 140–450)
PMV BLD AUTO: 11.6 FL (ref 6–12)
POTASSIUM SERPL-SCNC: 4.4 MMOL/L (ref 3.5–5.2)
RBC # BLD AUTO: 4.02 10*6/MM3 (ref 3.77–5.28)
SINUS: 3 CM
SODIUM SERPL-SCNC: 140 MMOL/L (ref 136–145)
STJ: 2.27 CM
UPPER ARTERIAL LEFT ARM BRACHIAL SYS MAX: 171
UPPER ARTERIAL RIGHT ARM BRACHIAL SYS MAX: 175
WBC NRBC COR # BLD AUTO: 6.34 10*3/MM3 (ref 3.4–10.8)

## 2025-02-05 PROCEDURE — 25010000002 LEVOTHYROXINE SODIUM 100 MCG/5ML SOLUTION: Performed by: HOSPITALIST

## 2025-02-05 PROCEDURE — 93306 TTE W/DOPPLER COMPLETE: CPT | Performed by: INTERNAL MEDICINE

## 2025-02-05 PROCEDURE — 93880 EXTRACRANIAL BILAT STUDY: CPT

## 2025-02-05 PROCEDURE — 83735 ASSAY OF MAGNESIUM: CPT | Performed by: STUDENT IN AN ORGANIZED HEALTH CARE EDUCATION/TRAINING PROGRAM

## 2025-02-05 PROCEDURE — 97166 OT EVAL MOD COMPLEX 45 MIN: CPT

## 2025-02-05 PROCEDURE — 80048 BASIC METABOLIC PNL TOTAL CA: CPT | Performed by: STUDENT IN AN ORGANIZED HEALTH CARE EDUCATION/TRAINING PROGRAM

## 2025-02-05 PROCEDURE — 93922 UPR/L XTREMITY ART 2 LEVELS: CPT | Performed by: STUDENT IN AN ORGANIZED HEALTH CARE EDUCATION/TRAINING PROGRAM

## 2025-02-05 PROCEDURE — 93922 UPR/L XTREMITY ART 2 LEVELS: CPT

## 2025-02-05 PROCEDURE — 85025 COMPLETE CBC W/AUTO DIFF WBC: CPT | Performed by: STUDENT IN AN ORGANIZED HEALTH CARE EDUCATION/TRAINING PROGRAM

## 2025-02-05 PROCEDURE — 93880 EXTRACRANIAL BILAT STUDY: CPT | Performed by: STUDENT IN AN ORGANIZED HEALTH CARE EDUCATION/TRAINING PROGRAM

## 2025-02-05 PROCEDURE — 25010000002 FUROSEMIDE PER 20 MG: Performed by: INTERNAL MEDICINE

## 2025-02-05 PROCEDURE — 84100 ASSAY OF PHOSPHORUS: CPT | Performed by: STUDENT IN AN ORGANIZED HEALTH CARE EDUCATION/TRAINING PROGRAM

## 2025-02-05 PROCEDURE — 93306 TTE W/DOPPLER COMPLETE: CPT

## 2025-02-05 PROCEDURE — 25510000001 PERFLUTREN 6.52 MG/ML SUSPENSION 2 ML VIAL: Performed by: STUDENT IN AN ORGANIZED HEALTH CARE EDUCATION/TRAINING PROGRAM

## 2025-02-05 PROCEDURE — 99222 1ST HOSP IP/OBS MODERATE 55: CPT | Performed by: INTERNAL MEDICINE

## 2025-02-05 PROCEDURE — 82533 TOTAL CORTISOL: CPT | Performed by: INTERNAL MEDICINE

## 2025-02-05 PROCEDURE — 99222 1ST HOSP IP/OBS MODERATE 55: CPT | Performed by: STUDENT IN AN ORGANIZED HEALTH CARE EDUCATION/TRAINING PROGRAM

## 2025-02-05 RX ORDER — LEVOTHYROXINE SODIUM 20 UG/ML
100 INJECTION, SOLUTION INTRAVENOUS
Status: DISCONTINUED | OUTPATIENT
Start: 2025-02-05 | End: 2025-02-08

## 2025-02-05 RX ORDER — ASPIRIN 81 MG/1
81 TABLET ORAL DAILY
Status: DISCONTINUED | OUTPATIENT
Start: 2025-02-05 | End: 2025-02-11 | Stop reason: HOSPADM

## 2025-02-05 RX ORDER — FUROSEMIDE 10 MG/ML
80 INJECTION INTRAMUSCULAR; INTRAVENOUS EVERY 12 HOURS
Status: COMPLETED | OUTPATIENT
Start: 2025-02-05 | End: 2025-02-06

## 2025-02-05 RX ORDER — ATORVASTATIN CALCIUM 20 MG/1
20 TABLET, FILM COATED ORAL NIGHTLY
Status: DISCONTINUED | OUTPATIENT
Start: 2025-02-05 | End: 2025-02-11 | Stop reason: HOSPADM

## 2025-02-05 RX ORDER — AMLODIPINE BESYLATE 5 MG/1
5 TABLET ORAL
Status: DISCONTINUED | OUTPATIENT
Start: 2025-02-05 | End: 2025-02-05

## 2025-02-05 RX ORDER — EMOLLIENT COMBINATION NO.110
LOTION (ML) TOPICAL DAILY
Status: DISCONTINUED | OUTPATIENT
Start: 2025-02-05 | End: 2025-02-11 | Stop reason: HOSPADM

## 2025-02-05 RX ADMIN — FUROSEMIDE 80 MG: 10 INJECTION, SOLUTION INTRAMUSCULAR; INTRAVENOUS at 20:18

## 2025-02-05 RX ADMIN — PERFLUTREN 2 ML: 6.52 INJECTION, SUSPENSION INTRAVENOUS at 15:15

## 2025-02-05 RX ADMIN — LEVOTHYROXINE SODIUM 50 MCG: 50 TABLET ORAL at 07:01

## 2025-02-05 RX ADMIN — Medication 10 ML: at 10:00

## 2025-02-05 RX ADMIN — LEVOTHYROXINE SODIUM 100 MCG: 20 INJECTION, SOLUTION INTRAVENOUS at 11:52

## 2025-02-05 RX ADMIN — Medication: at 15:24

## 2025-02-05 RX ADMIN — ATORVASTATIN CALCIUM 20 MG: 20 TABLET, FILM COATED ORAL at 20:18

## 2025-02-05 RX ADMIN — POLYETHYLENE GLYCOL 3350 17 G: 17 POWDER, FOR SOLUTION ORAL at 21:00

## 2025-02-05 RX ADMIN — ASPIRIN 81 MG: 81 TABLET, COATED ORAL at 15:24

## 2025-02-05 NOTE — H&P
Patient Name:  Rika Millan  YOB: 1955  MRN:  [id number]  Admit Date:  2/4/2025  Patient Care Team:  Provider, No Known as PCP - General      Subjective   History Present Illness     Chief Complaint   Patient presents with    Weakness - Generalized       Ms. Millan is a 69 y.o.  with no medical history due to limited medical care that presents to [facility] complaining of weakness/falls.    History of Present Illness  History is collected from patient and failed member at bedside.  Patient reports a slow decline over the last 2 to 3 months with increasing weakness and falls.  She reports that falls occur due to leg weakness, she denies any passing out/chest pain/shortness of breath.  She reports no appetite and a decreased oral intake recently.  Per ER provider the son checked on the patient today and she was unable to get around so he brought her into the hospital for further evaluation.    Review of Systems   Constitutional:  Positive for fatigue. Negative for fever.   Respiratory:  Negative for cough and shortness of breath.    Cardiovascular:  Positive for leg swelling. Negative for chest pain and palpitations.   Gastrointestinal:  Negative for abdominal pain, nausea and vomiting.   Neurological:  Positive for weakness and numbness (Toes sometimes).        Personal History     History reviewed. No pertinent past medical history.  History reviewed. No pertinent surgical history.  History reviewed. No pertinent family history.  Social History     Substance Use Topics    Alcohol use: Not Currently    Drug use: Never     No current facility-administered medications on file prior to encounter.     No current outpatient medications on file prior to encounter.     Allergies   Allergen Reactions    Codeine Nausea Only       Objective    Objective     Vital Signs  Temp:  [97.5 °F (36.4 °C)-97.6 °F (36.4 °C)] 97.5 °F (36.4 °C)  Heart Rate:  [56-67] 56  Resp:  [16-18] 16  BP:  (108-195)/() 168/100  SpO2:  [93 %-100 %] 100 %  on   ;   Device (Oxygen Therapy): room air  Body mass index is 39.05 kg/m².    Physical Exam  Constitutional:       General: She is not in acute distress.     Appearance: She is obese. She is ill-appearing.   HENT:      Head: Normocephalic and atraumatic.      Mouth/Throat:      Mouth: Mucous membranes are moist.      Pharynx: Oropharynx is clear.   Eyes:      Extraocular Movements: Extraocular movements intact.      Conjunctiva/sclera: Conjunctivae normal.      Pupils: Pupils are equal, round, and reactive to light.   Cardiovascular:      Rate and Rhythm: Normal rate and regular rhythm.   Pulmonary:      Effort: Pulmonary effort is normal. No respiratory distress.      Breath sounds: Normal breath sounds. No wheezing.   Abdominal:      General: Bowel sounds are normal. There is no distension.      Palpations: Abdomen is soft.      Tenderness: There is no abdominal tenderness.   Musculoskeletal:         General: No swelling or tenderness. Normal range of motion.      Right lower leg: Edema present.      Left lower leg: Edema present.   Skin:     General: Skin is warm and dry.      Comments: Skin tear to left shin, bandaged  Chronic venous stasis changes bilaterally  Edema bilaterally   Neurological:      General: No focal deficit present.      Mental Status: She is alert and oriented to person, place, and time. Mental status is at baseline.   Psychiatric:         Mood and Affect: Mood normal.         Behavior: Behavior normal.         Thought Content: Thought content normal.         Judgment: Judgment normal.         Results Review:  I reviewed the patient's new clinical results.  I reviewed the patient's new imaging results and agree with the interpretation.  I reviewed the patient's other test results and agree with the interpretation  I personally viewed and interpreted the patient's EKG/Telemetry data  Discussed with ED provider.    Lab Results (last 24  hours)       Procedure Component Value Units Date/Time    CBC & Differential [[id number]]  (Abnormal) Collected: 02/04/25 1218    Specimen: Blood from Arm, Left Updated: 02/04/25 1450    Narrative:      The following orders were created for panel order CBC & Differential.  Procedure                               Abnormality         Status                     ---------                               -----------         ------                     CBC Auto Differential[[id number]]        Abnormal            Final result                 Please view results for these tests on the individual orders.    Comprehensive Metabolic Panel [[id number]]  (Abnormal) Collected: 02/04/25 1218    Specimen: Blood from Arm, Left Updated: 02/04/25 1415     Glucose 102 mg/dL      BUN 28 mg/dL      Creatinine 1.94 mg/dL      Sodium 141 mmol/L      Potassium 4.6 mmol/L      Chloride 103 mmol/L      CO2 26.7 mmol/L      Calcium 9.6 mg/dL      Total Protein 8.2 g/dL      Albumin 4.5 g/dL      ALT (SGPT) 11 U/L      AST (SGOT) 27 U/L      Alkaline Phosphatase 81 U/L      Total Bilirubin 0.9 mg/dL      Globulin 3.7 gm/dL      A/G Ratio 1.2 g/dL      BUN/Creatinine Ratio 14.4     Anion Gap 11.3 mmol/L      eGFR 27.4 mL/min/1.73     Narrative:      GFR Categories in Chronic Kidney Disease (CKD)      GFR Category          GFR (mL/min/1.73)    Interpretation  G1                     90 or greater         Normal or high (1)  G2                      60-89                Mild decrease (1)  G3a                   45-59                Mild to moderate decrease  G3b                   30-44                Moderate to severe decrease  G4                    15-29                Severe decrease  G5                    14 or less           Kidney failure          (1)In the absence of evidence of kidney disease, neither GFR category G1 or G2 fulfill the criteria for CKD.    eGFR calculation 2021 CKD-EPI creatinine equation, which does not include race as a factor     BNP [[id number]]  (Normal) Collected: 02/04/25 1218    Specimen: Blood from Arm, Left Updated: 02/04/25 1421     proBNP 261.0 pg/mL     Narrative:      This assay is used as an aid in the diagnosis of individuals suspected of having heart failure. It can be used as an aid in the diagnosis of acute decompensated heart failure (ADHF) in patients presenting with signs and symptoms of ADHF to the emergency department (ED). In addition, NT-proBNP of <300 pg/mL indicates ADHF is not likely.    Age Range Result Interpretation  NT-proBNP Concentration (pg/mL:      <50             Positive            >450                   Gray                 300-450                    Negative             <300    50-75           Positive            >900                  Gray                300-900                  Negative            <300      >75             Positive            >1800                  Gray                300-1800                  Negative            <300    High Sensitivity Troponin T [[id number]]  (Abnormal) Collected: 02/04/25 1218    Specimen: Blood from Arm, Left Updated: 02/04/25 1424     HS Troponin T 77 ng/L     Narrative:      High Sensitive Troponin T Reference Range:  <14.0 ng/L- Negative Female for AMI  <22.0 ng/L- Negative Male for AMI  >=14 - Abnormal Female indicating possible myocardial injury.  >=22 - Abnormal Male indicating possible myocardial injury.   Clinicians would have to utilize clinical acumen, EKG, Troponin, and serial changes to determine if it is an Acute Myocardial Infarction or myocardial injury due to an underlying chronic condition.         Magnesium [[id number]]  (Normal) Collected: 02/04/25 1218    Specimen: Blood from Arm, Left Updated: 02/04/25 1415     Magnesium 2.4 mg/dL     Phosphorus [[id number]]  (Normal) Collected: 02/04/25 1218    Specimen: Blood from Arm, Left Updated: 02/04/25 1415     Phosphorus 3.0 mg/dL     TSH Rfx On Abnormal To Free T4 [[id number]]  (Abnormal) Collected:  02/04/25 1218    Specimen: Blood from Arm, Left Updated: 02/04/25 1421     TSH 92.600 uIU/mL     CBC Auto Differential [[id number]]  (Abnormal) Collected: 02/04/25 1218    Specimen: Blood from Arm, Left Updated: 02/04/25 1450     WBC 5.12 10*3/mm3      RBC 4.04 10*6/mm3      Hemoglobin 12.1 g/dL      Hematocrit 38.6 %      MCV 95.5 fL      MCH 30.0 pg      MCHC 31.3 g/dL      RDW 16.0 %      RDW-SD 55.4 fl      MPV 11.8 fL      Platelets 205 10*3/mm3      Neutrophil % 64.0 %      Lymphocyte % 26.6 %      Monocyte % 5.3 %      Eosinophil % 3.1 %      Basophil % 0.2 %      Immature Grans % 0.8 %      Neutrophils, Absolute 3.28 10*3/mm3      Lymphocytes, Absolute 1.36 10*3/mm3      Monocytes, Absolute 0.27 10*3/mm3      Eosinophils, Absolute 0.16 10*3/mm3      Basophils, Absolute 0.01 10*3/mm3      Immature Grans, Absolute 0.04 10*3/mm3      nRBC 0.0 /100 WBC     T4, Free [[id number]]  (Abnormal) Collected: 02/04/25 1218    Specimen: Blood from Arm, Left Updated: 02/04/25 1520     Free T4 <0.10 ng/dL     Urinalysis With Microscopic If Indicated (No Culture) - Urine, Catheter [[id number]]  (Abnormal) Collected: 02/04/25 1348    Specimen: Urine, Catheter Updated: 02/04/25 1358     Color, UA Yellow     Appearance, UA Clear     pH, UA 8.0     Specific Gravity, UA 1.008     Glucose, UA Negative     Ketones, UA Negative     Bilirubin, UA Negative     Blood, UA Small (1+)     Protein, UA 30 mg/dL (1+)     Leuk Esterase, UA Small (1+)     Nitrite, UA Negative     Urobilinogen, UA 0.2 E.U./dL    Urinalysis, Microscopic Only - Urine, Catheter [[id number]]  (Abnormal) Collected: 02/04/25 1348    Specimen: Urine, Catheter Updated: 02/04/25 1359     RBC, UA 0-2 /HPF      WBC, UA 11-20 /HPF      Bacteria, UA Trace /HPF      Squamous Epithelial Cells, UA 0-2 /HPF      Hyaline Casts, UA None Seen /LPF      Methodology Automated Microscopy    High Sensitivity Troponin T 1Hr [[id number]]  (Abnormal) Collected: 02/04/25 3612    Specimen:  Blood Updated: 02/04/25 1648     HS Troponin T 67 ng/L      Troponin T Numeric Delta -10 ng/L      Troponin T % Delta -13    Narrative:      High Sensitive Troponin T Reference Range:  <14.0 ng/L- Negative Female for AMI  <22.0 ng/L- Negative Male for AMI  >=14 - Abnormal Female indicating possible myocardial injury.  >=22 - Abnormal Male indicating possible myocardial injury.   Clinicians would have to utilize clinical acumen, EKG, Troponin, and serial changes to determine if it is an Acute Myocardial Infarction or myocardial injury due to an underlying chronic condition.                 Imaging Results (Last 24 Hours)       Procedure Component Value Units Date/Time    CT Head Without Contrast [[id number]] Collected: 02/04/25 1544     Updated: 02/04/25 1545    Narrative:      CT SCAN OF THE HEAD WITHOUT CONTRAST 02/04/2025     CLINICAL HISTORY: This is a 69-year-old female patient who presents to  the emergency room with generalized weakness and multiple falls, and has  had worsening urinary incontinence.     TECHNIQUE: Spiral CT images were obtained from the base of the skull to  the vertex without intravenous contrast. The images were reformatted and  are submitted in 3 mm thick axial, sagittal and coronal CT sections with  brain algorithm.     There are no prior head CTs or MRIs of the brain from [facility] for comparison.     FINDINGS: There are extensive patchy and confluent areas of low density  extending from the periventricular throughout the subcortical white  matter of the cerebral hemispheres that is most consistent with  moderate-to-severe small vessel disease. The remainder of the brain  parenchyma is normal in attenuation. There is diffuse cerebral atrophy.  The lateral and third ventricles are mildly prominent in size and this  is probably on the basis central volume loss or atrophy. I see no focal  mass effect. There is no midline shift. No extra-axial fluid collections  are  identified and there is no evidence of acute intracranial  hemorrhage. The calvarium and the skull base are normal in appearance.  The paranasal sinuses and the mastoid air cells and the middle ear  cavities are clear. There are lens implants in the globes bilaterally  from previous bilateral cataract surgery. Otherwise, the orbits are  unremarkable..       Impression:      1. No convincing acute intracranial abnormality is identified.     2. There is extensive confluent low-density throughout the cerebral  white matter that is most probably a manifestation of severe small  vessel disease. There is diffuse cerebral atrophy. The lateral and third  ventricles are prominent in size and this is most probably on the basis  of central volume loss or atrophy rather than NPH and correlate  clinically. There are lens implants in the globes from previous  bilateral cataract surgery. The remainder of the head CT is normal.      Radiation dose reduction techniques were utilized, including automated  exposure control and exposure modulation based on body size.          CT Angio Abdominal Aorta Bilateral Iliofem Runoff [[id number]] Collected: 02/04/25 1514     Updated: 02/04/25 1531    Narrative:      CTA ABDOMEN, PELVIS, BILATERAL LOWER EXTREMITY RUNOFF     HISTORY: Cold left foot, weakly dopplerable lower extremity pulses     TECHNIQUE: Radiation dose reduction techniques were utilized, including  automated exposure control and exposure modulation based on body size.  CT angiogram of the abdomen/pelvis and bilateral lower extremity runoff  was performed. Multiple coronal, sagittal, and 3-D reconstruction images  were obtained.     COMPARISON: None available     FINDINGS:  AORTOILIAC:  There is no abdominal aortic aneurysm. There is no stenosis of the  abdominal aorta. There is some minimal atherosclerotic plaque in the  abdominal aorta. The common iliac and external iliac arteries are widely  patent without evidence of aneurysm.  There is some mild stenosis of the  right internal iliac artery due to atherosclerotic plaque. The celiac  artery and SMA are patent. There is severe stenosis at the origin of the  right renal artery and a mild stenosis at the origin of the left renal  artery. The inferior mesenteric artery is patent.     RIGHT LOWER EXTREMITY:  The common femoral artery is widely patent. Areas of mild stenosis of  the distal superficial femoral artery. There are 2 separate severe  stenoses involving the popliteal artery above the knee. The dominant  runoff is supplied by the posterior tibial artery. There are areas of  stenosis or occlusion involving the anterior tibial artery. The peroneal  artery appears patent. There is edema present throughout the right lower  extremity, greatest below the knee. Postoperative changes of the distal  fibula and tibia     LEFT LOWER EXTREMITY:  The common femoral artery is patent. There are areas of mild stenosis in  the distal superficial femoral artery. There is moderate stenosis of the  mid popliteal artery. The posterior tibial artery is largely occluded.  The dominant runoff to the foot is supplied by the peroneal artery. The  anterior tibial artery is diffusely stenotic and appears occluded  distally. Left lower extremity edema, greatest below the knee.     ABDOMEN/PELVIS:  Areas of atelectasis or scarring in the lung bases. The liver is  unremarkable. Cholecystectomy. Spleen is unremarkable. There is some  decreased enhancement of the right kidney with respect to the left  likely due to the severe renal artery stenosis. The adrenal glands are  unremarkable. Pancreas is unremarkable. There is some nonspecific  stranding in the lower left retroperitoneum. The bladder appears  unremarkable. There is small amount of edema and fluid in the deep  pelvis. Scattered diverticula within the colon without diverticulitis.  Moderate stool. Lumbar spine degenerative changes       Impression:      1.  There is no significant aortoiliac inflow stenosis  2. There is severe stenoses within the right popliteal artery. Dominant  runoff on the right is supplied by the posterior tibial artery  3. Moderate stenosis of the mid left popliteal artery. Dominant runoff  on the left supplied by the peroneal artery  4. Severe stenosis at the origin of the right renal artery  5. Additional findings as described           Radiation dose reduction techniques were utilized, including automated  exposure control and exposure modulation based on body size.        This report was finalized on 2/4/2025 3:28 PM by Dr. Nate Gamble M.D  on Workstation: [id number]       XR Chest 1 View [[id number]] Collected: 02/04/25 1422     Updated: 02/04/25 1426    Narrative:      XR CHEST 1 VW-2/4/2025     HISTORY: Weakness.     Heart size is mildly enlarged. Lungs appear clear. Bones and soft  tissues are otherwise unremarkable.       Impression:      1. Mild cardiomegaly.        This report was finalized on 2/4/2025 2:23 PM by Dr. Ector Monahan M.D on Workstation: [id number]                   ECG 12 Lead Altered Mental Status   Preliminary Result   HEART RATE=62  bpm   RR Nazowtwu=613  ms   DC Relayqvh=596  ms   P Horizontal Axis=47  deg   P Front Axis=1  deg   QRSD Interval=81  ms   QT Xrzgsqse=011  ms   LUbS=283  ms   QRS Axis=20  deg   T Wave Axis=62  deg   - BORDERLINE ECG -   Sinus rhythm   Borderline T wave abnormalities   Date and Time of Study:2025-02-04 14:06:25           Assessment/Plan     Active Hospital Problems    Diagnosis  POA    **JUSTIN (acute kidney injury) [N17.9]  Yes    Weakness [R53.1]  Yes    Hypothyroidism [E03.9]  Yes    High blood pressure [I10]  Yes    Lower extremity edema [R60.0]  Unknown    Elevated troponin [R79.89]  Unknown    Obesity (BMI 30-39.9) [E66.9]  Unknown      Resolved Hospital Problems   No resolved problems to display.       Ms. Millan is a 69 y.o.  with no significant medical history due to  limited contact with doctors who presents with weakness and falls.    Hypothyroidism  - severely abnormal thyroid function tests with TSH of 90 and T4 undetectable, no signs of myxedema coma (pt is normotensive, normothermic, borderline HR), mental status is clear  - start levothyroxine 50mcg given elderly age/unknown cardiac status; will need repeat thyroid studies in 4-6 weeks with a PCP (patient will need to get established with PCP)    High blood pressure without a diagnosis of hypertension  - BP elevated on arrival to the hospital, improved as she got upstairs, monitor but will likely need to add  agent for undiagnosed HTN    Lower extremity edema  - multifactorial? Possibly related to hypothyroidism vs cardiac vs vascular? proBNP is normal but may be inaccurate in the setting of obesity  - echo is pending, vascular to see for CTA findings    Right popliteal artery severe stenosis  Left popliteal artery moderate stenosis  Severe stenosis of the right renal artery  - patient pulses required doppler in ed thus CTA ordered with the above findings  - will ask vascular to consult    JUSTIN vs JUSTIN on CKD vs CKD  - Cr 1.9; last lab available 1.1 in 2014, unclear if this represents CKD vs JUSTIN, vs JUSTIN on CKD  - possibly related to renal artery stenosis   - repeat BMP in am and trend    Elevated troponin  - EKG with possible T wave changes, no old EKGs for comparison  - troponin is trending down, patient denies CP or SOB  - cardiology consult in AM    Weakness  - PT/OT/CCP to see, treat underlying issues (hypothyroidism)     Obesity  - BMI 39    I discussed the patient's findings and my recommendations with patient, family, and ED provider.    VTE Prophylaxis - Lovenox 40 mg SC daily.  Code Status - Full code.       Pamela Cordon MD  [facility]  02/04/25  20:56 EST

## 2025-02-05 NOTE — PLAN OF CARE
Goal Outcome Evaluation:  Plan of Care Reviewed With: patient        Progress: no change  Outcome Evaluation: Pt is a 70 y/o female admitted to [facility] on 2/4/2025 for weakness. Workup revealing JUSTIN and UTI. At baseline, pt reports being (I) w/ ADLs and uses Rwx d/t recent reoccurance of falls. Today, pt presents to OT with decline in baseline function demo'ing deficits in act tolerance/ endurance, balance, and strength. Pt reqs Sba for bed mob using bed rails and increased time. Min Ax1+ HHA for STS xfer and to take several steps toward HOB for repositioning. Pt declined EOB ADLs and was Dep for donning socks d/t deconditioning. Pt would benefit from skilled OT to adress stated deficits and increase (I) w/ ADLs and xfers. Pt plans dc home with possible HH when medically able pending prog. OT will continue to monitor.    Anticipated Discharge Disposition (OT): home, home with home health

## 2025-02-05 NOTE — PROGRESS NOTES
Discharge Planning Assessment  [facility]     Patient Name: Rika Millan  MRN: [id number]  Today's Date: 2/5/2025    Admit Date: 2/4/2025    Plan: Return home with spouse   Discharge Needs Assessment       Row Name 02/05/25 0852       Living Environment    People in Home spouse    Name(s) of People in Home Kendall Borrero    Current Living Arrangements home    Potentially Unsafe Housing Conditions none    Primary Care Provided by self    Provides Primary Care For no one    Family Caregiver if Needed spouse    Family Caregiver Names  Adair [phone]    Quality of Family Relationships helpful    Able to Return to Prior Arrangements yes       Resource/Environmental Concerns    Resource/Environmental Concerns none    Transportation Concerns none       Transition Planning    Patient/Family Anticipates Transition to home with family    Patient/Family Anticipated Services at Transition none    Transportation Anticipated family or friend will provide       Discharge Needs Assessment    Readmission Within the Last 30 Days no previous admission in last 30 days    Equipment Currently Used at Home cane, straight;walker, standard    Concerns to be Addressed denies needs/concerns at this time    Anticipated Changes Related to Illness none    Equipment Needed After Discharge none                   Discharge Plan       Row Name 02/05/25 0853       Plan    Plan Return home with spouse    Patient/Family in Agreement with Plan yes    Plan Comments Spoke with patient at bedside.  She lives with  Adair [phone] in a  house.  She has a cane, walker and shower chair if needed but does not use them routinely.  She has never used HH or been to SNF.  She does not have a PCP.  Given list of [facility] physicians with contact information and CCP will F/U to see if she will make her own appointment or prefers CCP to do that.  Pharmacy is [facility].  Patient states she drives,  drives and will assist if  needed.  No PT eval yet.  Current plan is to return home at [place].  CCP will follow.  Lexii HARRIS                  Continued Care and Services - Admitted Since 2/4/2025    No active coordination exists for this encounter.       Expected Discharge Date and Time       Expected Discharge Date Expected Discharge Time    Feb 7, 2025            Demographic Summary       Row Name 02/05/25 0849       General Information    Admission Type inpatient    Arrived From home    Referral Source admission list    Reason for Consult discharge planning    Preferred Language English                   Functional Status       Row Name 02/05/25 0851       Functional Status    Usual Activity Tolerance moderate    Current Activity Tolerance moderate       Functional Status, IADL    Medications independent    Meal Preparation independent;assistive person   helps if needed    Housekeeping assistive person;independent    Laundry independent;assistive person    Shopping assistive person;independent       Mental Status    General Appearance WDL WDL       Mental Status Summary    Recent Changes in Mental Status/Cognitive Functioning no changes                     Becky S. [place], RN

## 2025-02-05 NOTE — PLAN OF CARE
Problem: Adult Inpatient Plan of Care  Goal: Plan of Care Review  Outcome: Progressing  Flowsheets (Taken 2/5/2025 0522)  Progress: no change  Outcome Evaluation: Came due to generalized weakness and urinary incontinence. A and O x 4, room air. Kidney function test is elevated, UA is positive with UTI. Fall risk prevention protocol maintained, PT/OT consulted.Cardiology and Vascular consulted  Plan of Care Reviewed With: patient   Goal Outcome Evaluation:  Plan of Care Reviewed With: patient        Progress: no change  Outcome Evaluation: Came due to generalized weakness and urinary incontinence. A and O x 4, room air. Kidney function test is elevated, UA is positive with UTI. Fall risk prevention protocol maintained, PT/OT consulted.Cardiology and Vascular consulted

## 2025-02-05 NOTE — SIGNIFICANT NOTE
02/05/25 1316   OTHER   Discipline physical therapist   Rehab Time/Intention   Session Not Performed patient unavailable for evaluation  (Pt. OMAR in vascular awaiting workup. PT will follow up as time allows, or next service date as appropriate.)   Therapy Assessment/Plan (PT)   Criteria for Skilled Interventions Met (PT) yes   Recommendation   PT - Next Appointment 02/05/25

## 2025-02-05 NOTE — PROGRESS NOTES
"    DAILY PROGRESS NOTE  [facility]    Patient Identification:  Name: Rika Millan  Age: 69 y.o.  Sex: female  :  1955  MRN: [id number]         Primary Care Physician: Provider, No Known    Subjective:  Interval History: Not feeling much better to this point.  She her main complaint is generalized weakness/malaise.  She denies any focal loss of function.  She is not complain of any chest pain or troubles breathing.  She denies any fever.  We discussed thyroid issues and she denies any previous history of hypothyroidism or being on levothyroxine    Objective:    Scheduled Meds:Eucerin original healing lotion, , Topical, Daily  Levothyroxine Sodium, 100 mcg, Intravenous, Daily  sodium chloride, 10 mL, Intravenous, Q12H      Continuous Infusions:     Vital signs in last 24 hours:  Temp:  [97.3 °F (36.3 °C)-98 °F (36.7 °C)] 97.3 °F (36.3 °C)  Heart Rate:  [56-65] 64  Resp:  [16-20] 20  BP: (108-195)/() 143/92    Intake/Output:    Intake/Output Summary (Last 24 hours) at 2025 1216  Last data filed at 2025 0530  Gross per 24 hour   Intake --   Output 2700 ml   Net -2700 ml       Exam:  /92 (BP Location: Right arm, Patient Position: Lying)   Pulse 64   Temp 97.3 °F (36.3 °C) (Oral)   Resp 20   Ht 160 cm (63\")   Wt 100 kg (220 lb 7.4 oz)   SpO2 95%   BMI 39.05 kg/m²     General Appearance:    Alert, cooperative, nontoxic, conversational and pleasant, AAOx3                          Head:    Normocephalic, without obvious abnormality, atraumatic                           Eyes:    PERRL, conjunctivae/corneas clear, EOM's intact, both eyes                         Throat:   Oral mucosa pink and moist                           Neck:   Very large diameter, unable to appreciate JVD                         Lungs:    Clear to auscultation bilaterally, respirations unlabored                 Chest Wall:    No tenderness or deformity                          Heart:    Regular rate and " rhythm, S1 and S2 normal                  Abdomen:     Soft, nontender, bowel sounds active, MO/BMI 39                 extremities: Stasis changes, no cellulitis, 1-2+ edema                        Pulses:   Pulses palpable in all extremities                  Neurologic:   CNII-XII intact, no focal deficits noted     Data Review:  Labs in chart were reviewed.    Assessment:  Active Hospital Problems    Diagnosis  POA    **JUSTIN (acute kidney injury) [N17.9]  Yes    Weakness [R53.1]  Yes    Hypothyroidism [E03.9]  Yes    High blood pressure [I10]  Yes    Lower extremity edema [R60.0]  Unknown    Elevated troponin [R79.89]  Unknown    Obesity (BMI 30-39.9) [E66.9]  Unknown      Resolved Hospital Problems   No resolved problems to display.       Plan:    Agree this is all severe hypothyroidism/myxedema and I think we can get more aggressive with the treatment.  Have discontinued p.o. levothyroxine and will initiate IV levothyroxine at 100 mcg daily and I will let this go for couple days and then start to trend TSH and T3/T4 levels daily and adjust back to p.o. when medically ready   -The above is driving all of her current complaints   -Cardiology following -pending echo   -ARF versus probable CKD with creatinine improving   -Labile hypertension -allow permissive hypertension for now until vascular can evaluate given stenotic lesions.  Blood pressure is already coming down nicely with no management and she has had a low of 137/74 and is currently 143/92   -PAD/PVD with [facility] carotid ultrasounds and vascular pending.  CTA noted   -Home MAR shows no medications?   -PT OT to evaluate    Abel Owens MD  2/5/2025  12:16 EST

## 2025-02-05 NOTE — PLAN OF CARE
Goal Outcome Evaluation:              Outcome Evaluation: Pt A&Ox4 with some forgetfulness. On RA. Pt resting in bed with visitors at bedside. Bed alarm on and call light within reach.

## 2025-02-05 NOTE — CONSULTS
[facility] Consult Note      Patient Name: Rika Millan  : 1955  MRN: [id number]  Primary Care Physician:  Provider, No Known  Referring Physician: Sae Vizcaino MD  Date of admission: 2025    Subjective     Reason for Consult: JUSTIN versus CKD    HPI:   Rika Millan is a 69 y.o. female with baseline renal function unknown, presented to the hospital yesterday for progressively worsening weakness, decreased oral intake, and multiple recent falls.     On admission, /106, SCr 1.94, TSH 92.6, free T4 undetectable.  Patient was started on hydralazine and levothyroxine.  CT angio suggested severe stenosis to R popliteal artery, moderate stenosis of the mid left popliteal artery, and severe stenosis of R renal artery, evaluated by vascular surgery, no immediate surgical intervention indicated.    Patient usually eats 1 meal a day; denies chest pain, SOA, regular NSAID use, or urinary symptoms except incontinence; noted to have increased BLE edema lately.    Review of Systems:   14 point review of systems is otherwise negative except for mentioned above on HPI    Personal History     History reviewed. No pertinent past medical history.    Past Surgical History:   Procedure Laterality Date    ORIF ANKLE FRACTURE Right     more than 10years ago       Family History: family history includes No Known Problems in her father and mother.    Social History:  reports that she has been smoking cigarettes. She does not have any smokeless tobacco history on file. She reports that she does not currently use alcohol. She reports that she does not use drugs.    Home Medications:  Prior to Admission medications    Not on File       Allergies:  Allergies   Allergen Reactions    Codeine Nausea Only    Morphine Nausea And Vomiting    Percocet [Oxycodone-Acetaminophen] Nausea And Vomiting       Objective     Vitals:   Temp:  [97.3 °F (36.3 °C)-98 °F (36.7 °C)] 97.5 °F (36.4 °C)  Heart Rate:   [56-68] 67  Resp:  [16-20] 20  BP: (137-168)/() 155/91    Intake/Output Summary (Last 24 hours) at 2/5/2025 1859  Last data filed at 2/5/2025 1834  Gross per 24 hour   Intake 720 ml   Output 2300 ml   Net -1580 ml       Physical Exam:   Constitutional: Awake, alert, no acute distress.  HEENT: Sclera anicteric, no conjunctival injection  Neck: Supple, no thyromegaly, no lymphadenopathy, trachea at midline, no JVD  Respiratory: Clear to auscultation bilaterally, nonlabored respiration  Cardiovascular: RRR, no murmurs, no rubs or gallops, no carotid bruit  Gastrointestinal: Positive bowel sounds, abdomen is soft, nontender and nondistended  : No palpable bladder  Musculoskeletal: 3-4+ BLE edema up to thigh, no clubbing or cyanosis  Psychiatric: Appropriate affect, cooperative  Neurologic: Oriented x3, moving all extremities, normal speech and mental status, no asterixis  Skin: Warm and dry, scaly and dry       Scheduled Meds:     aspirin, 81 mg, Oral, Daily  Eucerin original healing lotion, , Topical, Daily  Levothyroxine Sodium, 100 mcg, Intravenous, Daily  sodium chloride, 10 mL, Intravenous, Q12H      IV Meds:        Results Reviewed:   I have personally reviewed the results from the time of this admission to 2/5/2025 18:59 EST     Lab Results   Component Value Date    GLUCOSE 122 (H) 02/05/2025    CALCIUM 8.9 02/05/2025     02/05/2025    K 4.4 02/05/2025    CO2 27.2 02/05/2025     02/05/2025    BUN 23 02/05/2025    CREATININE 1.78 (H) 02/05/2025    BCR 12.9 02/05/2025    ANIONGAP 7.8 02/05/2025      Lab Results   Component Value Date    MG 2.5 (H) 02/05/2025    PHOS 2.5 02/05/2025    ALBUMIN 4.5 02/04/2025           Assessment / Plan       JUSTIN (acute kidney injury)    Weakness    Hypothyroidism    High blood pressure    Lower extremity edema    Elevated troponin    Obesity (BMI 30-39.9)      ASSESSMENT:  JUSTIN versus CKD, baseline renal function unknown.  On admission, SCr 1.94, today 1.78, in  association with decreased oral intake, uncontrolled HTN, limited renal perfusion due to atherosclerotic disease (severe stenosis to R renal artery), and severe hypothyroidism; hypovolemic on exam; electrolytes within acceptable range  Severe hypothyroidism, TSH 90, T4 undetectable, started on levothyroxine, managed by primary team  BLE edema/PAD, evaluated by vascular surgery, no immediate surgical intervention suggested at this time  Severe R renal artery stenosis, evaluated by vascular surgery. Suggesting medical management   HTN, likely in association with atherosclerotic disease, BP was severely elevated on admission, improved after IV hydralazine  Obesity  Weakness/mechanical falls likely from mixedema   UTI, managed by primary team  Elevated troponin, evaluated by cardiology, no ischemic workup suggested    PLAN:  Will give IV Lasix 80 mg BID x 2 to improve volume status, will further adjust regimen based on clinical response   Will obtain lipid panel and start atorvastatin 20 mg   Check Melody, UPCR  Surveillance labs    Thank you for involving us in the care of Rika Millan.  Please feel free to call with any questions.    Abilio Soriano MD  02/05/25  18:59 EST    [facility]  [phone]      Please note that portions of this note were completed with a voice recognition program.

## 2025-02-05 NOTE — CONSULTS
Name: Rika Millan ADMIT: 2025   : 1955  PCP: Provider, No Known    MRN: [id number] LOS: 1 days   AGE/SEX: 69 y.o. female  ROOM: [facility]/     Inpatient Vascular Surgery Consult  Consult performed by: Margret Arriola MD  Consult ordered by: Pamela Cordon MD Ashlee Ann Vinyard, MD     LOS: 1 day   Patient Care Team:  Provider, No Known as PCP - General    Subjective     Chief complaint: bilateral leg swelling and pain, generalized weakness    History of Present Illness  69 y.o. female with obesity but no other documented medical history due to limited medical care.  She presented to [facility] with falls due to generalized weakness.  She has upper and lower extremity weakness.  She has bilateral leg swelling, which has been present for a long time.  She has skin cracking but no ulcerations.  She does currently smoke cigarettes and has smoked up to 1 pack per day for the last 40-50 years.  She does report feeling generally cold, which she did attribute to the weather.  She denies any prior DVT.  She denies claudication and rest pain.  She did not have a readily palpable pedal pulse in the ER yesterday so a CTA runoff was obtained, which showed bilateral popliteal artery and tibial artery disease, with severe stenosis of the right popliteal artery and moderate stenosis of the left popliteal artery.  This also showed an incidental severe right renal artery stenosis.  During initial work up, she was found to have JUSTIN and severe hypothyroidism.  She was quite hypertensive on arrival but this is being treated as weel.  She does not take any home medications.      Review of Systems   All other systems reviewed and are negative.      History reviewed. No pertinent past medical history.    Past Surgical History:   Procedure Laterality Date    ORIF ANKLE FRACTURE Right     more than 10years ago       Family History   Problem Relation Age of Onset    No Known Problems Mother     No Known Problems Father           Social History     Tobacco Use    Smoking status: Every Day     Types: Cigarettes    Tobacco comments:     4-5 sticks per day   Vaping Use    Vaping status: Never Used   Substance Use Topics    Alcohol use: Not Currently    Drug use: Never       Allergies: Codeine, Morphine, and Percocet [oxycodone-acetaminophen]    No medications prior to admission.     levothyroxine, 50 mcg, Oral, Q AM  sodium chloride, 10 mL, Intravenous, Q12H           acetaminophen **OR** acetaminophen **OR** acetaminophen    senna-docusate sodium **AND** polyethylene glycol **AND** bisacodyl **AND** bisacodyl    melatonin    ondansetron ODT **OR** ondansetron    sodium chloride    sodium chloride      Objective   Temp:  [97.3 °F (36.3 °C)-98 °F (36.7 °C)] 97.3 °F (36.3 °C)  Heart Rate:  [56-67] 64  Resp:  [16-20] 20  BP: (108-195)/() 143/92    No intake/output data recorded.    Physical Exam  Vitals reviewed.   Constitutional:       General: She is not in acute distress.     Appearance: Normal appearance. She is obese.   HENT:      Head: Normocephalic and atraumatic.      Right Ear: External ear normal.      Left Ear: External ear normal.      Nose: Nose normal.      Mouth/Throat:      Mouth: Mucous membranes are moist.      Pharynx: Oropharynx is clear.   Eyes:      Extraocular Movements: Extraocular movements intact.      Conjunctiva/sclera: Conjunctivae normal.      Pupils: Pupils are equal, round, and reactive to light.   Cardiovascular:      Rate and Rhythm: Normal rate and regular rhythm.      Pulses:           Carotid pulses are 2+ on the right side and 2+ on the left side.       Radial pulses are 2+ on the right side and 2+ on the left side.        Femoral pulses are 1+ on the right side.       Dorsalis pedis pulses are 1+ on the right side and 1+ on the left side.        Posterior tibial pulses are 1+ on the right side and 1+ on the left side.      Comments: Bilateral legs with 2+ edema below the knee, sparing the ankle  and the foot.  Bilateral hyperkeratotic, cracked and dry skin, no ulcerations.  Hyperpigmentation bilaterally, no varicose veins  Pulmonary:      Comments: Non labored respirations on room air, equal chest rise  Abdominal:      General: Abdomen is flat. There is no distension.      Palpations: Abdomen is soft.   Musculoskeletal:      Cervical back: Normal range of motion. No rigidity.      Right lower le+ Edema present.      Left lower le+ Edema present.   Skin:     General: Skin is warm and dry.      Capillary Refill: Capillary refill takes less than 2 seconds.   Neurological:      General: No focal deficit present.      Mental Status: She is alert and oriented to person, place, and time.   Psychiatric:         Mood and Affect: Mood normal.         Behavior: Behavior normal.         Results from last 7 days   Lab Units 25  0308 25  1218   WBC 10*3/mm3 6.34 5.12   HEMOGLOBIN g/dL 12.6 12.1   PLATELETS 10*3/mm3 196 205     Results from last 7 days   Lab Units 25  0308 25  1218   SODIUM mmol/L 140 141   POTASSIUM mmol/L 4.4 4.6   CHLORIDE mmol/L 105 103   CO2 mmol/L 27.2 26.7   BUN mg/dL 23 28*   CREATININE mg/dL 1.78* 1.94*   GLUCOSE mg/dL 122* 102*   Estimated Creatinine Clearance: 33.1 mL/min (A) (by C-G formula based on SCr of 1.78 mg/dL (H)).      Imaging Studies:    CT Angio Abdominal Aorta Bilateral Iliofem Runoff [[id number]] (Order [id number])  Order  Status: Final result     Patient Location    Patient Class Location   Inpatient [address], [facility], 1     [phone]     Study Notes     Marcell Cole on 2025  2:57 PM EST   Er room 13  no known medical history who presents to the ED c/o acute weakness and urinary incontinence.  Weakly dopplerable lower extremity pulses, left foot is cold when compared to right, no known history of PVD  Multiple falls generalized weakness  Patient has had approximately 2 years of worsening urinary incontinence.  She states that it has been  nearly constant dribble.  She has the urge to go.  She denies any dysuria or change in urinary character.  She is also had recurrent falls at home due to generalized weakness.  She is also had bilateral leg swelling that has been ongoing for 2 years.  Patient has not seen a doctor in several years and does not have a PCP.  She has no known or diagnosed medical problems.  She denies chest pain or shortness of breath.  She has normal p.o. intake at home.  No nausea, vomiting, diarrhea, abdominal pain, abdominal distention.  No leg pain noted.     Patient gfr is 27 dr pagan aware and still wanted scan  Right arm left down by side during scan   Halle James on 2/4/2025  2:34 PM EST   GFR = 27   Halle James on 2/4/2025  1:56 PM EST   PENDING LABS @ 1355.     Appointment Information    PACS Images     Radiology Images  Related Results     CT Head Without Contrast Final result 2/4/2025          Narrative   CT SCAN OF THE HEAD WITHOUT CONTRAST 02/04/2025     CLINICAL HISTORY: This is a 69-year-old female patient who presents to  the emergency room with generalized weakness and multiple falls, and has  had worsening urinary incontinence.     TECHNIQUE: Spiral CT images were obtained from the base of the skull to  the vertex without intravenous contrast. The images were reformatted and  are submitted in 3 mm thick axial, sagittal and coronal CT sections with  brain algorithm. ...   Impression   1. No convincing acute intracranial abnormality is identified.     2. There is extensive confluent low-density throughout the cerebral  white matter that is most probably a manifestation of severe small  vessel disease. There is diffuse cerebral atrophy. The lateral and third  ventricles are prominent in size and this is most probably on the basis  of central volume loss or atrophy rather than NPH and correlate  clinically. There are lens implants in the globes from previous  bilateral cataract surgery. The remainder of the  head CT is normal.    ...           Study Result    Narrative & Impression   CTA ABDOMEN, PELVIS, BILATERAL LOWER EXTREMITY RUNOFF     HISTORY: Cold left foot, weakly dopplerable lower extremity pulses     TECHNIQUE: Radiation dose reduction techniques were utilized, including  automated exposure control and exposure modulation based on body size.  CT angiogram of the abdomen/pelvis and bilateral lower extremity runoff  was performed. Multiple coronal, sagittal, and 3-D reconstruction images  were obtained.     COMPARISON: None available     FINDINGS:  AORTOILIAC:  There is no abdominal aortic aneurysm. There is no stenosis of the  abdominal aorta. There is some minimal atherosclerotic plaque in the  abdominal aorta. The common iliac and external iliac arteries are widely  patent without evidence of aneurysm. There is some mild stenosis of the  right internal iliac artery due to atherosclerotic plaque. The celiac  artery and SMA are patent. There is severe stenosis at the origin of the  right renal artery and a mild stenosis at the origin of the left renal  artery. The inferior mesenteric artery is patent.     RIGHT LOWER EXTREMITY:  The common femoral artery is widely patent. Areas of mild stenosis of  the distal superficial femoral artery. There are 2 separate severe  stenoses involving the popliteal artery above the knee. The dominant  runoff is supplied by the posterior tibial artery. There are areas of  stenosis or occlusion involving the anterior tibial artery. The peroneal  artery appears patent. There is edema present throughout the right lower  extremity, greatest below the knee. Postoperative changes of the distal  fibula and tibia     LEFT LOWER EXTREMITY:  The common femoral artery is patent. There are areas of mild stenosis in  the distal superficial femoral artery. There is moderate stenosis of the  mid popliteal artery. The posterior tibial artery is largely occluded.  The dominant runoff to the foot  is supplied by the peroneal artery. The  anterior tibial artery is diffusely stenotic and appears occluded  distally. Left lower extremity edema, greatest below the knee.     ABDOMEN/PELVIS:  Areas of atelectasis or scarring in the lung bases. The liver is  unremarkable. Cholecystectomy. Spleen is unremarkable. There is some  decreased enhancement of the right kidney with respect to the left  likely due to the severe renal artery stenosis. The adrenal glands are  unremarkable. Pancreas is unremarkable. There is some nonspecific  stranding in the lower left retroperitoneum. The bladder appears  unremarkable. There is small amount of edema and fluid in the deep  pelvis. Scattered diverticula within the colon without diverticulitis.  Moderate stool. Lumbar spine degenerative changes     IMPRESSION:  1. There is no significant aortoiliac inflow stenosis  2. There is severe stenoses within the right popliteal artery. Dominant  runoff on the right is supplied by the posterior tibial artery  3. Moderate stenosis of the mid left popliteal artery. Dominant runoff  on the left supplied by the peroneal artery  4. Severe stenosis at the origin of the right renal artery  5. Additional findings as described           Radiation dose reduction techniques were utilized, including automated  exposure control and exposure modulation based on body size.        This report was finalized on 2/4/2025 3:28 PM by Dr. Nate Gamble M.D  on Workstation: [id number]            Data Points:  During this visit the following were done:  Labs Reviewed [x]    Labs Ordered [x]    Radiology Reports Reviewed [x]    Radiology Images Reviewed []    Radiology Ordered []    EKG, echo, and/or stress test reviewed []    Vascular lab results reviewed  []    Vascular lab images reviewed and interpreted per myself   [x]    Referring Provider Records Reviewed []    ER Records Reviewed [x]    Hospital Records Reviewed/Summarized [x]    History Obtained From  Family []    Radiological images view and Interpreted per myself [x]    Case Discussed with referring provider []     Decision to obtain and request outside records  []    Total data points reviewed: 7      Active Hospital Problems    Diagnosis  POA    **JUSTIN (acute kidney injury) [N17.9]  Yes    Weakness [R53.1]  Yes    Hypothyroidism [E03.9]  Yes    High blood pressure [I10]  Yes    Lower extremity edema [R60.0]  Unknown    Elevated troponin [R79.89]  Unknown    Obesity (BMI 30-39.9) [E66.9]  Unknown      Resolved Hospital Problems   No resolved problems to display.         Assessment & Plan       JUSTIN (acute kidney injury)    Weakness    Hypothyroidism    High blood pressure    Lower extremity edema    Elevated troponin    Obesity (BMI 30-39.9)        69 y.o. female with obesity and no diagnosed past medical history due to absence of medical care who presented with generalized weakness, hypertension, renal insufficiency, and bilateral leg swelling.      -generalized weakness due to hypothyroidism, which is being treated medically.  Also likely a component of deconditioning.  Pt/OT ordered.  Carotid duplex ordered and mild, < 50% stenosis bilaterally with antegrade flow in bilateral vertebral arteries.  This is not contributing to her weakness or falls.    -renal insufficiency- unclear how much of this is acute or subacute.  She does have evidence of significant right renal artery stenosis with diminished enhancement of the right kidney and the right kidney is smaller (4.5 cm) in diameter than the left kidney (6.2 cm), which suggests a chronic arterial stenosis.  -PAD- I did personally and independently review her CTA runoff from overnight.  This showed very little aortic atherosclerosis and no aortic aneurysm but there is a severe proximal right renal artery stenosis, as I discussed above.  There is not significant occlusive disease above the inguinal ligament.  There is mild atherosclerosis above the knee  bilaterally, with moderate left popliteal artery stenosis and severe right popliteal artery stenosis.  There is bilateral tibial artery disease.  These are all chronic findings.  There is no evidence of acute limb ischemia and no indication for acute vascular intervention or therapeutic anticoagulation.  She is asymptomatic with regards to her PAD.  I did strongly recommend smoking cessation.  Will start aspirin 81 mg daily.  ABIs ordered, will review.    -leg swelling and skin changes are due to chronic venous hypertension or possibly lymphedema.  No surgical intervention.  Will review WENDY results, but if satisfactory may begin compression treatment.  Will need local wound care for her skin.      Addendum: ABIs reviewed, mild PAD bilaterally.  Recommend medical management only. Will follow up nephrology assessment.  Unclear if she has renovascular hypertension since she has had untreated hypertension for such a period and needs to be medically managed, also unclear about chronic renal insufficiency vs acute.  There is not an urgent indication for renal artery intervention.  In fact, if this is indicated it would be better to try to stabilize kidney function pre procedure.    I discussed the patients findings and my recommendations with patient.  Please call my office with any question: [phone]    Margret Arriola MD  02/05/25  08:50 EST

## 2025-02-05 NOTE — ED PROVIDER NOTES
EMERGENCY DEPARTMENT ENCOUNTER    Room number:  S607/1  Date Seen:  2/4/2025  Time of transfer:1500  PCP:  Provider, No Known    Laboratory Results:  Recent Results (from the past 24 hours)   Green Top (Gel)    Collection Time: 02/04/25 12:18 PM   Result Value Ref Range    Extra Tube Hold for add-ons.    Lavender Top    Collection Time: 02/04/25 12:18 PM   Result Value Ref Range    Extra Tube hold for add-on    Gold Top - SST    Collection Time: 02/04/25 12:18 PM   Result Value Ref Range    Extra Tube Hold for add-ons.    Light Blue Top    Collection Time: 02/04/25 12:18 PM   Result Value Ref Range    Extra Tube Hold for add-ons.    Comprehensive Metabolic Panel    Collection Time: 02/04/25 12:18 PM    Specimen: Arm, Left; Blood   Result Value Ref Range    Glucose 102 (H) 65 - 99 mg/dL    BUN 28 (H) 8 - 23 mg/dL    Creatinine 1.94 (H) 0.57 - 1.00 mg/dL    Sodium 141 136 - 145 mmol/L    Potassium 4.6 3.5 - 5.2 mmol/L    Chloride 103 98 - 107 mmol/L    CO2 26.7 22.0 - 29.0 mmol/L    Calcium 9.6 8.6 - 10.5 mg/dL    Total Protein 8.2 6.0 - 8.5 g/dL    Albumin 4.5 3.5 - 5.2 g/dL    ALT (SGPT) 11 1 - 33 U/L    AST (SGOT) 27 1 - 32 U/L    Alkaline Phosphatase 81 39 - 117 U/L    Total Bilirubin 0.9 0.0 - 1.2 mg/dL    Globulin 3.7 gm/dL    A/G Ratio 1.2 g/dL    BUN/Creatinine Ratio 14.4 7.0 - 25.0    Anion Gap 11.3 5.0 - 15.0 mmol/L    eGFR 27.4 (L) >60.0 mL/min/1.73   BNP    Collection Time: 02/04/25 12:18 PM    Specimen: Arm, Left; Blood   Result Value Ref Range    proBNP 261.0 0.0 - 900.0 pg/mL   High Sensitivity Troponin T    Collection Time: 02/04/25 12:18 PM    Specimen: Arm, Left; Blood   Result Value Ref Range    HS Troponin T 77 (C) <14 ng/L   Magnesium    Collection Time: 02/04/25 12:18 PM    Specimen: Arm, Left; Blood   Result Value Ref Range    Magnesium 2.4 1.6 - 2.4 mg/dL   Phosphorus    Collection Time: 02/04/25 12:18 PM    Specimen: Arm, Left; Blood   Result Value Ref Range    Phosphorus 3.0 2.5 - 4.5 mg/dL    TSH Rfx On Abnormal To Free T4    Collection Time: 02/04/25 12:18 PM    Specimen: Arm, Left; Blood   Result Value Ref Range    TSH 92.600 (H) 0.270 - 4.200 uIU/mL   CBC Auto Differential    Collection Time: 02/04/25 12:18 PM    Specimen: Arm, Left; Blood   Result Value Ref Range    WBC 5.12 3.40 - 10.80 10*3/mm3    RBC 4.04 3.77 - 5.28 10*6/mm3    Hemoglobin 12.1 12.0 - 15.9 g/dL    Hematocrit 38.6 34.0 - 46.6 %    MCV 95.5 79.0 - 97.0 fL    MCH 30.0 26.6 - 33.0 pg    MCHC 31.3 (L) 31.5 - 35.7 g/dL    RDW 16.0 (H) 12.3 - 15.4 %    RDW-SD 55.4 (H) 37.0 - 54.0 fl    MPV 11.8 6.0 - 12.0 fL    Platelets 205 140 - 450 10*3/mm3    Neutrophil % 64.0 42.7 - 76.0 %    Lymphocyte % 26.6 19.6 - 45.3 %    Monocyte % 5.3 5.0 - 12.0 %    Eosinophil % 3.1 0.3 - 6.2 %    Basophil % 0.2 0.0 - 1.5 %    Immature Grans % 0.8 (H) 0.0 - 0.5 %    Neutrophils, Absolute 3.28 1.70 - 7.00 10*3/mm3    Lymphocytes, Absolute 1.36 0.70 - 3.10 10*3/mm3    Monocytes, Absolute 0.27 0.10 - 0.90 10*3/mm3    Eosinophils, Absolute 0.16 0.00 - 0.40 10*3/mm3    Basophils, Absolute 0.01 0.00 - 0.20 10*3/mm3    Immature Grans, Absolute 0.04 0.00 - 0.05 10*3/mm3    nRBC 0.0 0.0 - 0.2 /100 WBC   T4, Free    Collection Time: 02/04/25 12:18 PM    Specimen: Arm, Left; Blood   Result Value Ref Range    Free T4 <0.10 (L) 0.92 - 1.68 ng/dL   Urinalysis With Microscopic If Indicated (No Culture) - Urine, Catheter    Collection Time: 02/04/25  1:48 PM    Specimen: Urine, Catheter   Result Value Ref Range    Color, UA Yellow Yellow, Straw    Appearance, UA Clear Clear    pH, UA 8.0 5.0 - 8.0    Specific Gravity, UA 1.008 1.005 - 1.030    Glucose, UA Negative Negative    Ketones, UA Negative Negative    Bilirubin, UA Negative Negative    Blood, UA Small (1+) (A) Negative    Protein, UA 30 mg/dL (1+) (A) Negative    Leuk Esterase, UA Small (1+) (A) Negative    Nitrite, UA Negative Negative    Urobilinogen, UA 0.2 E.U./dL 0.2 - 1.0 E.U./dL   Urinalysis, Microscopic  Only - Urine, Catheter    Collection Time: 02/04/25  1:48 PM    Specimen: Urine, Catheter   Result Value Ref Range    RBC, UA 0-2 None Seen, 0-2 /HPF    WBC, UA 11-20 (A) None Seen, 0-2 /HPF    Bacteria, UA Trace (A) None Seen /HPF    Squamous Epithelial Cells, UA 0-2 None Seen, 0-2 /HPF    Hyaline Casts, UA None Seen None Seen /LPF    Methodology Automated Microscopy    ECG 12 Lead Altered Mental Status    Collection Time: 02/04/25  2:06 PM   Result Value Ref Range    QT Interval 449 ms    QTC Interval 455 ms   High Sensitivity Troponin T 1Hr    Collection Time: 02/04/25  4:12 PM    Specimen: Blood   Result Value Ref Range    HS Troponin T 67 (C) <14 ng/L    Troponin T Numeric Delta -10 ng/L    Troponin T % Delta -13 Abnormal if >/= 20%     I reviewed the above results.    Radiology:  CT Head Without Contrast    Result Date: 2/4/2025  CT SCAN OF THE HEAD WITHOUT CONTRAST 02/04/2025  CLINICAL HISTORY: This is a 69-year-old female patient who presents to the emergency room with generalized weakness and multiple falls, and has had worsening urinary incontinence.  TECHNIQUE: Spiral CT images were obtained from the base of the skull to the vertex without intravenous contrast. The images were reformatted and are submitted in 3 mm thick axial, sagittal and coronal CT sections with brain algorithm.  There are no prior head CTs or MRIs of the brain from [facility] for comparison.  FINDINGS: There are extensive patchy and confluent areas of low density extending from the periventricular throughout the subcortical white matter of the cerebral hemispheres that is most consistent with moderate-to-severe small vessel disease. The remainder of the brain parenchyma is normal in attenuation. There is diffuse cerebral atrophy. The lateral and third ventricles are mildly prominent in size and this is probably on the basis central volume loss or atrophy. I see no focal mass effect. There is no midline shift. No  extra-axial fluid collections are identified and there is no evidence of acute intracranial hemorrhage. The calvarium and the skull base are normal in appearance. The paranasal sinuses and the mastoid air cells and the middle ear cavities are clear. There are lens implants in the globes bilaterally from previous bilateral cataract surgery. Otherwise, the orbits are unremarkable..      1. No convincing acute intracranial abnormality is identified.  2. There is extensive confluent low-density throughout the cerebral white matter that is most probably a manifestation of severe small vessel disease. There is diffuse cerebral atrophy. The lateral and third ventricles are prominent in size and this is most probably on the basis of central volume loss or atrophy rather than NPH and correlate clinically. There are lens implants in the globes from previous bilateral cataract surgery. The remainder of the head CT is normal.  Radiation dose reduction techniques were utilized, including automated exposure control and exposure modulation based on body size.       CT Angio Abdominal Aorta Bilateral Iliofem Runoff    Result Date: 2/4/2025  CTA ABDOMEN, PELVIS, BILATERAL LOWER EXTREMITY RUNOFF  HISTORY: Cold left foot, weakly dopplerable lower extremity pulses  TECHNIQUE: Radiation dose reduction techniques were utilized, including automated exposure control and exposure modulation based on body size. CT angiogram of the abdomen/pelvis and bilateral lower extremity runoff was performed. Multiple coronal, sagittal, and 3-D reconstruction images were obtained.  COMPARISON: None available  FINDINGS: AORTOILIAC: There is no abdominal aortic aneurysm. There is no stenosis of the abdominal aorta. There is some minimal atherosclerotic plaque in the abdominal aorta. The common iliac and external iliac arteries are widely patent without evidence of aneurysm. There is some mild stenosis of the right internal iliac artery due to  atherosclerotic plaque. The celiac artery and SMA are patent. There is severe stenosis at the origin of the right renal artery and a mild stenosis at the origin of the left renal artery. The inferior mesenteric artery is patent.  RIGHT LOWER EXTREMITY: The common femoral artery is widely patent. Areas of mild stenosis of the distal superficial femoral artery. There are 2 separate severe stenoses involving the popliteal artery above the knee. The dominant runoff is supplied by the posterior tibial artery. There are areas of stenosis or occlusion involving the anterior tibial artery. The peroneal artery appears patent. There is edema present throughout the right lower extremity, greatest below the knee. Postoperative changes of the distal fibula and tibia  LEFT LOWER EXTREMITY: The common femoral artery is patent. There are areas of mild stenosis in the distal superficial femoral artery. There is moderate stenosis of the mid popliteal artery. The posterior tibial artery is largely occluded. The dominant runoff to the foot is supplied by the peroneal artery. The anterior tibial artery is diffusely stenotic and appears occluded distally. Left lower extremity edema, greatest below the knee.  ABDOMEN/PELVIS: Areas of atelectasis or scarring in the lung bases. The liver is unremarkable. Cholecystectomy. Spleen is unremarkable. There is some decreased enhancement of the right kidney with respect to the left likely due to the severe renal artery stenosis. The adrenal glands are unremarkable. Pancreas is unremarkable. There is some nonspecific stranding in the lower left retroperitoneum. The bladder appears unremarkable. There is small amount of edema and fluid in the deep pelvis. Scattered diverticula within the colon without diverticulitis. Moderate stool. Lumbar spine degenerative changes      1. There is no significant aortoiliac inflow stenosis 2. There is severe stenoses within the right popliteal artery. Dominant  runoff on the right is supplied by the posterior tibial artery 3. Moderate stenosis of the mid left popliteal artery. Dominant runoff on the left supplied by the peroneal artery 4. Severe stenosis at the origin of the right renal artery 5. Additional findings as described    Radiation dose reduction techniques were utilized, including automated exposure control and exposure modulation based on body size.   This report was finalized on 2/4/2025 3:28 PM by Dr. Nate Gamble M.D on Workstation: [id number]      XR Chest 1 View    Result Date: 2/4/2025  XR CHEST 1 VW-2/4/2025  HISTORY: Weakness.  Heart size is mildly enlarged. Lungs appear clear. Bones and soft tissues are otherwise unremarkable.      1. Mild cardiomegaly.   This report was finalized on 2/4/2025 2:23 PM by Dr. Ector Monahan M.D on Workstation: [id number]     I reviewed the above results    Medications ordered in ED:  Medications   sodium chloride 0.9 % flush 10 mL ( Intravenous Canceled Entry 2/4/25 2156)   sodium chloride 0.9 % flush 10 mL (has no administration in time range)   sodium chloride 0.9 % infusion 40 mL (has no administration in time range)   ondansetron ODT (ZOFRAN-ODT) disintegrating tablet 4 mg (has no administration in time range)     Or   ondansetron (ZOFRAN) injection 4 mg (has no administration in time range)   melatonin tablet 5 mg (has no administration in time range)   sennosides-docusate (PERICOLACE) 8.6-50 MG per tablet 2 tablet (has no administration in time range)     And   polyethylene glycol (MIRALAX) packet 17 g (has no administration in time range)     And   bisacodyl (DULCOLAX) EC tablet 5 mg (has no administration in time range)     And   bisacodyl (DULCOLAX) suppository 10 mg (has no administration in time range)   acetaminophen (TYLENOL) tablet 650 mg (has no administration in time range)     Or   acetaminophen (TYLENOL) 160 MG/5ML oral solution 650 mg (has no administration in time range)     Or   acetaminophen  (TYLENOL) suppository 650 mg (has no administration in time range)   levothyroxine (SYNTHROID, LEVOTHROID) tablet 50 mcg (50 mcg Oral Given 2/4/25 2156)   iopamidol (ISOVUE-370) 76 % injection 100 mL (95 mL Intravenous Given by Other 2/4/25 1458)   hydrALAZINE (APRESOLINE) injection 10 mg (10 mg Intravenous Given 2/4/25 1531)       Progress and Consult Notes:  ED Course as of 02/04/25 2329 Tue Feb 04, 2025   1408 EKG interpreted by myself  Time: 1406  Rate: 62  Rhythm: NSR  No ST elevation or depression  Normal intervals  Normal morphology [AB]   1420 Urinalysis With Microscopic If Indicated (No Culture) - Urine, Catheter(!) [AB]   1420 Creatinine(!): 1.94 [AB]   1420 XR Chest 1 View  My independent interpretation of the imaging study is no pulmonary edema [AB]   1439 HS Troponin T(!!): 77 [AB]   1439 TSH Baseline(!): 92.600 [AB]   1504 CT Head Without Contrast  My independent interpretation of the imaging study is no ICH [AB]   1505 Care transferred to NIDHI Gallardo, pending completion of workup and admission. [AB]   1528 I discussed patient with Dr. Alfred, radiologist.  CT head demonstrates diffuse cerebral atrophy, significant small vessel disease, some ventricular enlargement likely consistent with atrophy and not NPH [KA]   1623 I discussed patient with Dr. Cordon, hospitalist.  Discussed patient's history presentation workup and she agrees to admit for further evaluation treatment [KA]   1630 I reassessed the patient, she is in no distress.  With her permission I counseled her and her son at the bedside and all of her lab and imaging results and importance of admission for further evaluation and treatment and she is agreeable. [KA]   1730 HS Troponin T(!!): 67 [KA]      ED Course User Index  [AB] Sae Vizcaino MD  [KA] Falguni Marie PA-C       Diagnosis:  Final diagnoses:   JUSTIN (acute kidney injury)   Leg swelling   Hypertension, unspecified type   Hypothyroidism, unspecified type   Peripheral arterial  disease   Renal artery stenosis           Provider attestation:  I personally reviewed the past medical history, past surgical history, social history, family history, current medications, and allergies as they appear in the chart.             Falguni Marie PA-C  02/04/25 [id number]

## 2025-02-05 NOTE — CONSULTS
Patient Name: Rika Millan  :1955  69 y.o.    Date of Admission: 2025  Date of Consultation:  25  Encounter Provider: Rios Chance III, MD  Place of Service: [facility] CARDIOLOGY  Referring Provider: Sae Vizcaino MD  Patient Care Team:  Provider, No Known as PCP - General      Chief complaint: Generalized weakness    History of Present Illness: Rika Millan is a 69 year old pt with no medical history due to limited medical care.    This 69-year-old female with no known prior significant medical history presented to the emergency room with complaint of worsening weakness, multiple falls.  Patient reported a slow decline over the prior several months with increasing weakness, increased fatigability.  Patient noted that she had been really cold lately but thought it was probably due to just to the weather.  She been having some problems with constipation.  She noticed that her skin was really dry and again attributed this to the weather.  She says she just felt really sluggish.    She started having episodes of falls.  She was just weak and she would fall down.  She said her legs just would not support her.  No dizziness or lightheadedness, no feeling of palpitations or tachycardia.  No chest pain or chest discomfort.  No shortness of breath.    Her son came to check her on her and then brought her to the emergency room because she was unable to get around and walk.    In the emergency room BUN was 28, creatinine was 1.94.  Free T4 was undetectable, TSH was 92.6.  She was diagnosed with acute hypothyroidism.    Troponin was above reference at 77, repeat was 67.  She had no anginal symptoms.    Blood pressure was elevated on arrival 188/106.    CT of the head showed nothing acute, CT of the abdomen pelvis showed bilateral lower extremity peripheral arterial disease, most prominent in the right popliteal artery.  She also had moderate stenosis in the left  "popliteal artery and severe stenosis of the right renal artery.    EKG was obtained that showed no acute changes.  We were consulted because of the troponin above reference.      No known cardiac history.  Specifically,  no history of coronary artery disease, congestive heart failure, rheumatic fever, rheumatic heart disease, or congenital heart disease.      History reviewed. No pertinent past medical history.    Past Surgical History:   Procedure Laterality Date    ORIF ANKLE FRACTURE Right     more than 10years ago         Prior to Admission medications    Not on File       Allergies   Allergen Reactions    Codeine Nausea Only    Morphine Nausea And Vomiting    Percocet [Oxycodone-Acetaminophen] Nausea And Vomiting       Social History     Socioeconomic History    Marital status:    Tobacco Use    Smoking status: Every Day     Types: Cigarettes    Tobacco comments:     4-5 sticks per day   Vaping Use    Vaping status: Never Used   Substance and Sexual Activity    Alcohol use: Not Currently    Drug use: Never    Sexual activity: Defer       Family History   Problem Relation Age of Onset    No Known Problems Mother     No Known Problems Father        REVIEW OF SYSTEMS:   All systems reviewed.  Pertinent positives identified in HPI.  All other systems are negative.      Objective:     Vitals:    02/04/25 1816 02/04/25 2024 02/04/25 2336 02/05/25 0739   BP: (!) 186/117 168/100 137/74 143/92   BP Location:  Left arm Right arm Right arm   Patient Position:  Lying Lying Lying   Pulse: 65 56 64    Resp:  16 16 20   Temp:  97.5 °F (36.4 °C) 98 °F (36.7 °C) 97.3 °F (36.3 °C)   TempSrc:  Oral Oral Oral   SpO2: 93% 100% 95%    Weight:  100 kg (220 lb 7.4 oz)     Height:  160 cm (63\")       Body mass index is 39.05 kg/m².    Physical Exam:  General Appearance:    Alert, cooperative, in no acute distress   Head:    Normocephalic, without obvious abnormality   Eyes:            Lids and lashes normal, conjunctivae and " sclerae normal, no icterus, no pallor, corneas clear   Ears:    Ears appear intact with no abnormalities noted   Throat:   No oral lesions, oral mucosa moist   Neck:   No adenopathy, supple, trachea midline, no thyromegaly, no carotid bruit, no JVD   Back:     No kyphosis present, no erythema or scars, no tenderness to palpation    Lungs:     Clear to auscultation,respirations regular, even and unlabored    Heart:    Regular rhythm and normal rate, normal S1 and S2, no murmur, no gallop, no rub, no click   Chest Wall:    No abnormalities observed   Abdomen:     Normal bowel sounds, no masses, no organomegaly, soft        non-tender, non-distended, no guarding   Extremities:   Moves all extremities well, no edema, no cyanosis, no redness   Pulses:  Bilateral carotids brisk   Skin:  Psychiatric:   No bleeding or rash    Alert and oriented, normal mood and affect         Lab Review:     Results from last 7 days   Lab Units 02/05/25  0308 02/04/25  1218   SODIUM mmol/L 140 141   POTASSIUM mmol/L 4.4 4.6   CHLORIDE mmol/L 105 103   CO2 mmol/L 27.2 26.7   BUN mg/dL 23 28*   CREATININE mg/dL 1.78* 1.94*   CALCIUM mg/dL 8.9 9.6   BILIRUBIN mg/dL  --  0.9   ALK PHOS U/L  --  81   ALT (SGPT) U/L  --  11   AST (SGOT) U/L  --  27   GLUCOSE mg/dL 122* 102*     Results from last 7 days   Lab Units 02/04/25  1612 02/04/25  1218   HSTROP T ng/L 67* 77*     Results from last 7 days   Lab Units 02/05/25  0308   WBC 10*3/mm3 6.34   HEMOGLOBIN g/dL 12.6   HEMATOCRIT % 37.4   PLATELETS 10*3/mm3 196         Results from last 7 days   Lab Units 02/05/25  0308   MAGNESIUM mg/dL 2.5*                                   I personally viewed and interpreted the patient's EKG/Telemetry data.      Current Facility-Administered Medications:     acetaminophen (TYLENOL) tablet 650 mg, 650 mg, Oral, Q4H PRN **OR** acetaminophen (TYLENOL) 160 MG/5ML oral solution 650 mg, 650 mg, Oral, Q4H PRN **OR** acetaminophen (TYLENOL) suppository 650 mg, 650 mg,  Rectal, Q4H PRN, Pamela Cordon MD    sennosides-docusate (PERICOLACE) 8.6-50 MG per tablet 2 tablet, 2 tablet, Oral, BID PRN **AND** polyethylene glycol (MIRALAX) packet 17 g, 17 g, Oral, Daily PRN **AND** bisacodyl (DULCOLAX) EC tablet 5 mg, 5 mg, Oral, Daily PRN **AND** bisacodyl (DULCOLAX) suppository 10 mg, 10 mg, Rectal, Daily PRN, Pamela Cordon MD    levothyroxine (SYNTHROID, LEVOTHROID) tablet 50 mcg, 50 mcg, Oral, Q AM, Pamela Cordon MD, 50 mcg at 02/05/25 0701    melatonin tablet 5 mg, 5 mg, Oral, Nightly PRN, Pamela Cordon MD    ondansetron ODT (ZOFRAN-ODT) disintegrating tablet 4 mg, 4 mg, Oral, Q6H PRN **OR** ondansetron (ZOFRAN) injection 4 mg, 4 mg, Intravenous, Q6H PRN, Pamela Cordon MD    sodium chloride 0.9 % flush 10 mL, 10 mL, Intravenous, Q12H, Pamela Cordon MD    sodium chloride 0.9 % flush 10 mL, 10 mL, Intravenous, PRN, Pamela Cordon MD    sodium chloride 0.9 % infusion 40 mL, 40 mL, Intravenous, PRN, Pamela Cordon MD    Assessment and Plan:       Active Hospital Problems    Diagnosis  POA    **JUSTIN (acute kidney injury) [N17.9]  Yes    Weakness [R53.1]  Yes    Hypothyroidism [E03.9]  Yes    High blood pressure [I10]  Yes    Lower extremity edema [R60.0]  Unknown    Elevated troponin [R79.89]  Unknown    Obesity (BMI 30-39.9) [E66.9]  Unknown      Resolved Hospital Problems   No resolved problems to display.     1.  Acute hypothyroidism, severe, per primary team  2.  PAD-vascular surgery has been consulted  3.  Severe stenosis of the right renal artery, bilateral peripheral arterial disease  4.  Troponin above reference-no angina pectoris, no acute changes on EKG, troponin levels are frequently elevated in the setting of acute severe hypothyroidism.  Echocardiogram ordered, pending.  No plans for ischemic evaluation at this moment pending results on the echocardiogram  5.  Acute renal failure  6.  Elevated hypertension on arrival, no prior  diagnosis of hypertension but no recent medical evaluation.  Await review by vascular surgery vis-à-vis the severe right renal artery stenosis.    Rios Chance III, MD  02/05/25  09:41 EST

## 2025-02-05 NOTE — THERAPY EVALUATION
Patient Name: Rika Millan  : 1955    MRN: [id number]                              Today's Date: 2025       Admit Date: 2025    Visit Dx:     ICD-10-CM ICD-9-CM   1. JUSTIN (acute kidney injury)  N17.9 584.9   2. Leg swelling  M79.89 729.81   3. Hypertension, unspecified type  I10 401.9   4. Hypothyroidism, unspecified type  E03.9 244.9   5. Peripheral arterial disease  I73.9 443.9   6. Renal artery stenosis  I70.1 440.1     Patient Active Problem List   Diagnosis    JUSTIN (acute kidney injury)    Weakness    Hypothyroidism    High blood pressure    Lower extremity edema    Elevated troponin    Obesity (BMI 30-39.9)     History reviewed. No pertinent past medical history.  Past Surgical History:   Procedure Laterality Date    ORIF ANKLE FRACTURE Right     more than 10years ago      General Information       Row Name 25 1117          OT Time and Intention    Document Type evaluation  -PP     Mode of Treatment individual therapy;occupational therapy  -PP     Patient Effort good  -PP       Row Name 25 1117          General Information    Patient Profile Reviewed yes  -PP     Prior Level of Function independent:;ADL's;all household mobility;bed mobility;gait  -PP     Existing Precautions/Restrictions fall  -PP     Barriers to Rehab none identified  -PP       Row Name 25 1117          Living Environment    People in Home spouse  -PP       Row Name 25 1117          Home Main Entrance    Number of Stairs, Main Entrance none  -PP       Row Name 25 1117          Cognition    Orientation Status (Cognition) oriented x 3  -PP       Row Name 25 1117          Safety Issues/Impairments Affecting Functional Mobility    Impairments Affecting Function (Mobility) balance;endurance/activity tolerance;strength  -PP     Comment, Safety Issues/Impairments (Mobility) gait belt and non skid socks worn for safety  -PP               User Key  (r) = Recorded By, (t) = Taken By, (c) = Cosigned By       Initials Name Provider Type    PP Eneida Chandra OT Occupational Therapist                     Mobility/ADL's       Row Name 02/05/25 1119          Bed Mobility    Bed Mobility supine-sit;sit-supine  -PP     Supine-Sit [place] (Bed Mobility) standby assist  -PP     Sit-Supine [place] (Bed Mobility) standby assist  -PP     Assistive Device (Bed Mobility) bed rails  -PP     Comment, (Bed Mobility) heavy reliance on bed rails, increased time req  -PP       Row Name 02/05/25 1119          Transfers    Transfers stand-sit transfer;sit-stand transfer  -PP       Row Name 02/05/25 1119          Sit-Stand Transfer    Sit-Stand [place] (Transfers) minimum assist (75% patient effort)  -PP     Assistive Device (Sit-Stand Transfers) other (see comments)  HHA x1  -PP       Row Name 02/05/25 [facility]9          Functional Mobility    Functional Mobility- Ind. Level minimum assist (75% patient effort);1 person  -PP     Functional Mobility- Device other (see comments)  -PP     Functional Mobility- Comment Pt able to take several steps toward HOB for repositioning with Min ELIER x1 and HHA  -PP       Row Name 02/05/25 1119          Activities of Daily Living    BADL Assessment/Intervention lower body dressing;grooming;feeding;toileting  -PP       Row Name 02/05/25 1119          Self-Feeding Assessment/Training    [place] Level (Feeding) feeding skills;independent  -PP       Row Name 02/05/25 1119          Lower Body Dressing Assessment/Training    [place] Level (Lower Body Dressing) doff;don;socks;dependent (less than 25% patient effort)  -PP     Position (Lower Body Dressing) sitting up in bed  -PP     Comment, (Lower Body Dressing) Pt reqs total assist to don socks but reports she does not wear socks and baseline  -PP       Row Name 02/05/25 1119          Grooming Assessment/Training    [place] Level (Grooming) set up;grooming skills  -PP       Row Name 02/05/25 1119          Toileting  Assessment/Training    [place] Level (Toileting) dependent (less than 25% patient effort);toileting skills;maximum assist (25% patient effort)  -PP     Comment, (Toileting) David and brief present, Pt encouraged to use BSC with nursing assist and prog to BR as mob improves to promote baseline (I) prior to dc home. Pt apprehensive but open to it.  -PP               User Key  (r) = Recorded By, (t) = Taken By, (c) = Cosigned By      Initials Name Provider Type    PP Eneida Chandra, OT Occupational Therapist                   Obj/Interventions       Row Name 02/05/25 1125          Sensory Assessment (Somatosensory)    Sensory Assessment (Somatosensory) UE sensation intact  -PP     Sensory Assessment per pt report  -PP       Row Name 02/05/25 1125          Vision Assessment/Intervention    Visual Impairment/Limitations WFL  -PP       Row Name 02/05/25 1125          Range of Motion Comprehensive    General Range of Motion bilateral upper extremity ROM WFL  -PP       Row Name 02/05/25 1125          Strength Comprehensive (MMT)    General Manual Muscle Testing (MMT) Assessment other (see comments)  -PP     Comment, General Manual Muscle Testing (MMT) Assessment Some gen weakness noted, grossly 3+/5  -PP       Row Name 02/05/25 1125          Motor Skills    Motor Skills functional endurance  -PP     Functional Endurance fair  -PP       Row Name 02/05/25 1125          Balance    Balance Assessment sitting static balance;standing static balance;standing dynamic balance  -PP     Static Sitting Balance standby assist  -PP     Position, Sitting Balance sitting edge of bed  -PP     Static Standing Balance contact guard  -PP     Dynamic Standing Balance minimal assist  -PP     Position/Device Used, Standing Balance supported  -PP               User Key  (r) = Recorded By, (t) = Taken By, (c) = Cosigned By      Initials Name Provider Type    PP Eneida Chandra, OT Occupational Therapist                   Goals/Plan        Row Name 02/05/25 1135          Bed Mobility Goal 1 (OT)    Activity/Assistive Device (Bed Mobility Goal 1, OT) bed mobility activities, all  -PP     [place] Level/Cues Needed (Bed Mobility Goal 1, OT) modified independence  -PP     Time Frame (Bed Mobility Goal 1, OT) short term goal (STG);2 weeks  -PP       Row Name 02/05/25 1135          Transfer Goal 1 (OT)    Activity/Assistive Device (Transfer Goal 1, OT) transfers, all  -PP     [place] Level/Cues Needed (Transfer Goal 1, OT) modified independence  -PP     Time Frame (Transfer Goal 1, OT) short term goal (STG);2 weeks  -PP     Progress/Outcome (Transfer Goal 1, OT) new goal  -PP       Row Name 02/05/25 1135          Dressing Goal 1 (OT)    Activity/Device (Dressing Goal 1, OT) dressing skills, all;upper body dressing;lower body dressing  -PP     [place]/Cues Needed (Dressing Goal 1, OT) modified independence  -PP     Time Frame (Dressing Goal 1, OT) short term goal (STG);2 weeks  -PP     Progress/Outcome (Dressing Goal 1, OT) new goal  -PP       Row Name 02/05/25 1135          Toileting Goal 1 (OT)    Activity/Device (Toileting Goal 1, OT) toileting skills, all  -PP     [place] Level/Cues Needed (Toileting Goal 1, OT) modified independence  -PP     Time Frame (Toileting Goal 1, OT) short term goal (STG);2 weeks  -PP     Progress/Outcome (Toileting Goal 1, OT) new goal  -PP       Row Name 02/05/25 1135          Grooming Goal 1 (OT)    Activity/Device (Grooming Goal 1, OT) grooming skills, all  -PP     [place] (Grooming Goal 1, OT) modified independence  -PP     Time Frame (Grooming Goal 1, OT) short term goal (STG);2 weeks  -PP     Progress/Outcome (Grooming Goal 1, OT) new goal  -PP       Row Name 02/05/25 1135          Therapy Assessment/Plan (OT)    Planned Therapy Interventions (OT) activity tolerance training;BADL retraining;functional balance retraining;occupation/activity based interventions;patient/caregiver  education/training;strengthening exercise;transfer/mobility retraining  -PP               User Key  (r) = Recorded By, (t) = Taken By, (c) = Cosigned By      Initials Name Provider Type    PP Eneida Chandra, MARIBEL Occupational Therapist                   Clinical Impression       Row Name 02/05/25 1126          Pain Assessment    Pretreatment Pain Rating 0/10 - no pain  -PP     Posttreatment Pain Rating 0/10 - no pain  -PP     Pre/Posttreatment Pain Comment no reports of pain at this time  -PP       Row Name 02/05/25 1126          Plan of Care Review    Plan of Care Reviewed With patient  -PP     Progress no change  -PP     Outcome Evaluation Pt is a 70 y/o female admitted to [facility] on 2/4/2025 for weakness. Workup revealing JUSTIN and UTI. At baseline, pt reports being (I) w/ ADLs and uses Rwx d/t recent reoccurance of falls. Today, pt presents to OT with decline in baseline function demo'ing deficits in act tolerance/ endurance, balance, and strength. Pt reqs Sba for bed mob using bed rails and increased time. Min Ax1+ HHA for STS xfer and to take several steps toward HOB for repositioning. Pt declined EOB ADLs and was Dep for donning socks d/t deconditioning. Pt would benefit from skilled OT to adress stated deficits and increase (I) w/ ADLs and xfers. Pt plans dc home with possible HH when medically able pending prog. OT will continue to monitor.  -PP       Row Name 02/05/25 1126          Therapy Assessment/Plan (OT)    Rehab Potential (OT) good  -PP     Criteria for Skilled Therapeutic Interventions Met (OT) yes;skilled treatment is necessary  -PP     Therapy Frequency (OT) 5 times/wk  -PP       Row Name 02/05/25 1126          Therapy Plan Review/Discharge Plan (OT)    Anticipated Discharge Disposition (OT) home;home with home health  -PP       Row Name 02/05/25 1126          Vital Signs    O2 Delivery Pre Treatment room air  -PP       Row Name 02/05/25 1126          Positioning and Restraints    Pre-Treatment  Position in bed  -PP     Post Treatment Position bed  -PP     In Bed notified nsg;call light within reach;encouraged to call for assist;exit alarm on;sitting  -PP               User Key  (r) = Recorded By, (t) = Taken By, (c) = Cosigned By      Initials Name Provider Type    Eneida Gabriel OT Occupational Therapist                   Outcome Measures       Row Name 02/05/25 1136          How much help from another is currently needed...    Putting on and taking off regular lower body clothing? 1  -PP     Bathing (including washing, rinsing, and drying) 2  -PP     Toileting (which includes using toilet bed pan or urinal) 2  -PP     Putting on and taking off regular upper body clothing 3  -PP     Taking care of personal grooming (such as brushing teeth) 3  -PP     Eating meals 4  -PP     AM-PAC 6 Clicks Score (OT) 15  -PP       Row Name 02/05/25 0845          How much help from another person do you currently need...    Turning from your back to your side while in flat bed without using bedrails? 3  -CC     Moving from lying on back to sitting on the side of a flat bed without bedrails? 2  -CC     Moving to and from a bed to a chair (including a wheelchair)? 2  -CC     Standing up from a chair using your arms (e.g., wheelchair, bedside chair)? 2  -CC     Climbing 3-5 steps with a railing? 2  -CC     To walk in hospital room? 2  -CC     AM-PAC 6 Clicks Score (PT) 13  -CC       Row Name 02/05/25 1136          Functional Assessment    Outcome Measure Options AM-PAC 6 Clicks Daily Activity (OT)  -PP               User Key  (r) = Recorded By, (t) = Taken By, (c) = Cosigned By      Initials Name Provider Type    PP Eneida Chandra OT Occupational Therapist    CC Manuel Limon, RN Registered Nurse                    Occupational Therapy Education       Title: PT OT SLP Therapies (In Progress)       Topic: Occupational Therapy (In Progress)       Point: ADL training (Done)       Description:   Instruct learner(s)  on proper safety adaptation and remediation techniques during self care or transfers.   Instruct in proper use of assistive devices.                  Learning Progress Summary            Patient Acceptance, E, VU by PP at 2/5/2025 1786    Comment: Pt Ed on OT role and dc planning. Pt encouraged to continue progression with OOB ADLs to promote (I) to baseline function prior to dc home.                      Point: Home exercise program (Not Started)       Description:   Instruct learner(s) on appropriate technique for monitoring, assisting and/or progressing therapeutic exercises/activities.                  Learner Progress:  Not documented in this visit.              Point: Precautions (Not Started)       Description:   Instruct learner(s) on prescribed precautions during self-care and functional transfers.                  Learner Progress:  Not documented in this visit.              Point: Body mechanics (Not Started)       Description:   Instruct learner(s) on proper positioning and spine alignment during self-care, functional mobility activities and/or exercises.                  Learner Progress:  Not documented in this visit.                              User Key       Initials Effective Dates Name Provider Type Discipline     06/09/23 -  Eneida Chandra OT Occupational Therapist OT                  OT Recommendation and Plan  Planned Therapy Interventions (OT): activity tolerance training, BADL retraining, functional balance retraining, occupation/activity based interventions, patient/caregiver education/training, strengthening exercise, transfer/mobility retraining  Therapy Frequency (OT): 5 times/wk  Plan of Care Review  Plan of Care Reviewed With: patient  Progress: no change  Outcome Evaluation: Pt is a 70 y/o female admitted to [facility] on 2/4/2025 for weakness. Workup revealing JUSTIN and UTI. At baseline, pt reports being (I) w/ ADLs and uses Rwx d/t recent reoccurance of falls. Today, pt presents to OT  with decline in baseline function demo'ing deficits in act tolerance/ endurance, balance, and strength. Pt reqs Sba for bed mob using bed rails and increased time. Min Ax1+ HHA for STS xfer and to take several steps toward HOB for repositioning. Pt declined EOB ADLs and was Dep for donning socks d/t deconditioning. Pt would benefit from skilled OT to adress stated deficits and increase (I) w/ ADLs and xfers. Pt plans dc home with possible HH when medically able pending prog. OT will continue to monitor.     Time Calculation:   Evaluation Complexity (OT)  Review Occupational Profile/Medical/Therapy History Complexity: expanded/moderate complexity  Assessment, Occupational Performance/Identification of Deficit Complexity: 3-5 performance deficits  Clinical Decision Making Complexity (OT): detailed assessment/moderate complexity  Overall Complexity of Evaluation (OT): moderate complexity     Time Calculation- OT       Row Name 02/05/25 1137             Time Calculation- OT    OT Start Time 0842  -PP      OT Stop Time 0853  -PP      OT Time Calculation (min) 11 min  -PP      OT Received On 02/05/25  -PP      OT - Next Appointment 02/06/25  -PP      OT Goal Re-Cert Due Date 02/19/25  -PP         Untimed Charges    OT Eval/Re-eval Minutes 11  -PP         Total Minutes    Untimed Charges Total Minutes 11  -PP       Total Minutes 11  -PP                User Key  (r) = Recorded By, (t) = Taken By, (c) = Cosigned By      Initials Name Provider Type    PP EmbarrassEneida sin, OT Occupational Therapist                  Therapy Charges for Today       Code Description Service Date Service Provider Modifiers Qty    [id number] HC OT EVAL MOD COMPLEXITY 3 2/5/2025 [place]ass, Porshia, OT GO 1                 Porshia Embarrass, OT  2/5/2025

## 2025-02-05 NOTE — NURSING NOTE
Reason for Visit: WOC Team consult for LLE skin tear. Patient has a traumatic wound on the left shin. The overlaying skin is folded over and loose. Tissue is moist, red and fragile appearing. Patient reports that it happened last week.      02/05/25 1206   Wound 02/04/25 2025 Left lower leg   Placement Date/Time: 02/04/25 2025   Side: Left  Orientation: lower  Location: leg   Dressing Appearance moist drainage   Base red;moist   Periwound ecchymotic;pink   Periwound Temperature warm   Periwound Skin Turgor soft   Edges jagged   Drainage Characteristics/Odor serosanguineous   Drainage Amount small   Dressing Care   (added opticel ag with the silicone border dressing)   Periwound Care dry periwound area maintained     Treatment Plan/Recommendations: Patient getting ready to leave for a procedure but I added opticel ag with the silicone border dressing to better manage the moisture and improve open tissue. Recommend to change every other day. Additionally, patient has very dry legs. Recommend to add moisturizer.     Wound Team Follow up Plan: WOC team will sign off at this time. Please reconsult if needed.

## 2025-02-06 LAB
ALBUMIN SERPL-MCNC: 4 G/DL (ref 3.5–5.2)
ANION GAP SERPL CALCULATED.3IONS-SCNC: 9.7 MMOL/L (ref 5–15)
BASOPHILS # BLD AUTO: 0.01 10*3/MM3 (ref 0–0.2)
BASOPHILS NFR BLD AUTO: 0.2 % (ref 0–1.5)
BUN SERPL-MCNC: 22 MG/DL (ref 8–23)
BUN/CREAT SERPL: 11.8 (ref 7–25)
CALCIUM SPEC-SCNC: 9.2 MG/DL (ref 8.6–10.5)
CHLORIDE SERPL-SCNC: 103 MMOL/L (ref 98–107)
CHOLEST SERPL-MCNC: 458 MG/DL (ref 0–200)
CO2 SERPL-SCNC: 27.3 MMOL/L (ref 22–29)
CREAT SERPL-MCNC: 1.86 MG/DL (ref 0.57–1)
DEPRECATED RDW RBC AUTO: 52.9 FL (ref 37–54)
EGFRCR SERPLBLD CKD-EPI 2021: 28.8 ML/MIN/1.73
EOSINOPHIL # BLD AUTO: 0.17 10*3/MM3 (ref 0–0.4)
EOSINOPHIL NFR BLD AUTO: 2.9 % (ref 0.3–6.2)
ERYTHROCYTE [DISTWIDTH] IN BLOOD BY AUTOMATED COUNT: 15.5 % (ref 12.3–15.4)
GLUCOSE SERPL-MCNC: 96 MG/DL (ref 65–99)
HCT VFR BLD AUTO: 38.4 % (ref 34–46.6)
HDLC SERPL-MCNC: 80 MG/DL (ref 40–60)
HGB BLD-MCNC: 12.8 G/DL (ref 12–15.9)
IMM GRANULOCYTES # BLD AUTO: 0.03 10*3/MM3 (ref 0–0.05)
IMM GRANULOCYTES NFR BLD AUTO: 0.5 % (ref 0–0.5)
LDLC SERPL CALC-MCNC: 348 MG/DL (ref 0–100)
LDLC/HDLC SERPL: 4.35 {RATIO}
LYMPHOCYTES # BLD AUTO: 1.27 10*3/MM3 (ref 0.7–3.1)
LYMPHOCYTES NFR BLD AUTO: 21.3 % (ref 19.6–45.3)
MAGNESIUM SERPL-MCNC: 2.4 MG/DL (ref 1.6–2.4)
MCH RBC QN AUTO: 31.4 PG (ref 26.6–33)
MCHC RBC AUTO-ENTMCNC: 33.3 G/DL (ref 31.5–35.7)
MCV RBC AUTO: 94.1 FL (ref 79–97)
MONOCYTES # BLD AUTO: 0.32 10*3/MM3 (ref 0.1–0.9)
MONOCYTES NFR BLD AUTO: 5.4 % (ref 5–12)
NEUTROPHILS NFR BLD AUTO: 4.15 10*3/MM3 (ref 1.7–7)
NEUTROPHILS NFR BLD AUTO: 69.7 % (ref 42.7–76)
NRBC BLD AUTO-RTO: 0 /100 WBC (ref 0–0.2)
PHOSPHATE SERPL-MCNC: 3.3 MG/DL (ref 2.5–4.5)
PLATELET # BLD AUTO: 192 10*3/MM3 (ref 140–450)
PMV BLD AUTO: 11.8 FL (ref 6–12)
POTASSIUM SERPL-SCNC: 4.3 MMOL/L (ref 3.5–5.2)
RBC # BLD AUTO: 4.08 10*6/MM3 (ref 3.77–5.28)
SODIUM SERPL-SCNC: 140 MMOL/L (ref 136–145)
TRIGL SERPL-MCNC: 152 MG/DL (ref 0–150)
URATE SERPL-MCNC: 6 MG/DL (ref 2.4–5.7)
VLDLC SERPL-MCNC: 30 MG/DL (ref 5–40)
WBC NRBC COR # BLD AUTO: 5.95 10*3/MM3 (ref 3.4–10.8)

## 2025-02-06 PROCEDURE — 83735 ASSAY OF MAGNESIUM: CPT

## 2025-02-06 PROCEDURE — 25010000002 FUROSEMIDE PER 20 MG: Performed by: INTERNAL MEDICINE

## 2025-02-06 PROCEDURE — 97162 PT EVAL MOD COMPLEX 30 MIN: CPT

## 2025-02-06 PROCEDURE — 25010000002 LEVOTHYROXINE SODIUM 100 MCG/5ML SOLUTION: Performed by: HOSPITALIST

## 2025-02-06 PROCEDURE — 85025 COMPLETE CBC W/AUTO DIFF WBC: CPT

## 2025-02-06 PROCEDURE — 84550 ASSAY OF BLOOD/URIC ACID: CPT

## 2025-02-06 PROCEDURE — 80061 LIPID PANEL: CPT | Performed by: INTERNAL MEDICINE

## 2025-02-06 PROCEDURE — 97530 THERAPEUTIC ACTIVITIES: CPT

## 2025-02-06 PROCEDURE — 99232 SBSQ HOSP IP/OBS MODERATE 35: CPT | Performed by: INTERNAL MEDICINE

## 2025-02-06 PROCEDURE — 80069 RENAL FUNCTION PANEL: CPT

## 2025-02-06 RX ADMIN — Medication: at 16:12

## 2025-02-06 RX ADMIN — Medication 10 ML: at 08:30

## 2025-02-06 RX ADMIN — FUROSEMIDE 80 MG: 10 INJECTION, SOLUTION INTRAMUSCULAR; INTRAVENOUS at 08:30

## 2025-02-06 RX ADMIN — ASPIRIN 81 MG: 81 TABLET, COATED ORAL at 08:29

## 2025-02-06 RX ADMIN — ATORVASTATIN CALCIUM 20 MG: 20 TABLET, FILM COATED ORAL at 19:40

## 2025-02-06 RX ADMIN — LEVOTHYROXINE SODIUM 100 MCG: 20 INJECTION, SOLUTION INTRAVENOUS at 11:50

## 2025-02-06 NOTE — PLAN OF CARE
Goal Outcome Evaluation:  Plan of Care Reviewed With: patient, spouse        Progress: improving     Pt is a 69 y.o. female with no significant PMH who was admitted to [facility] on 2/4/2025 with c/o weakness/falls. Patient is (I) at baseline, with use of a rollator and lives with her  who assists prn, in a house with a ramped entrance. Today, pt performed bed mobility with SBA up to Angi, required CG/Angi for transfers with a RW, and ambulated 5 R side steps, 2' fwd/bwd and standing MIP x2 with a seated rest break between each bout, a RW and CG/Angi. Pt is quick to fatigue, had no overt LOB or buckling, and had no c/o SOB or dizziness. Pt may benefit from skilled PT services acutely to address their impaired strength, balance and endurance, as well as, improve their level of independence prior to discharge. PEE w/ RN. Rec HH pending progress, upon DC.      Anticipated Discharge Disposition (PT): home with home health, home with assist (Pending progress)

## 2025-02-06 NOTE — PROGRESS NOTES
[facility] Progress Note      Patient Name: Rika Millan  : 1955  MRN: [id number]  Primary Care Physician:  Provider, No Known  Date of admission: 2025    Subjective     Interval History:   F/u CKD    Review of Systems:   Swelling improved  Denies dyspnea     Objective     Vitals:   Temp:  [97.5 °F (36.4 °C)-97.9 °F (36.6 °C)] 97.5 °F (36.4 °C)  Heart Rate:  [67-87] 70  Resp:  [16-20] 18  BP: (137-155)/(78-92) 137/84    Intake/Output Summary (Last 24 hours) at 2025 1330  Last data filed at 2025 0555  Gross per 24 hour   Intake 240 ml   Output 2000 ml   Net -1760 ml       Physical Exam:    General Appearance: alert, comfortable  Neck: supple, no JVD  Lungs: CTA bila tno rales  Heart: RRR, normal S1 and S2  Abdomen: soft, nontender, nondistended  : no palpable bladder  Extremities: 1+ BLE edema    Scheduled Meds:     aspirin, 81 mg, Oral, Daily  atorvastatin, 20 mg, Oral, Nightly  Eucerin original healing lotion, , Topical, Daily  Levothyroxine Sodium, 100 mcg, Intravenous, Daily  sodium chloride, 10 mL, Intravenous, Q12H      IV Meds:        Results Reviewed:   I have personally reviewed the results from the time of this admission to 2025 13:30 EST     Results from last 7 days   Lab Units 25  0532 25  0308 25  1218   SODIUM mmol/L 140 140 141   POTASSIUM mmol/L 4.3 4.4 4.6   CHLORIDE mmol/L 103 105 103   CO2 mmol/L 27.3 27.2 26.7   BUN mg/dL 22 23 28*   CREATININE mg/dL 1.86* 1.78* 1.94*   CALCIUM mg/dL 9.2 8.9 9.6   BILIRUBIN mg/dL  --   --  0.9   ALK PHOS U/L  --   --  81   ALT (SGPT) U/L  --   --  11   AST (SGOT) U/L  --   --  27   GLUCOSE mg/dL 96 122* 102*     Estimated Creatinine Clearance: 31.7 mL/min (A) (by C-G formula based on SCr of 1.86 mg/dL (H)).  Results from last 7 days   Lab Units 25  0532 25  0308 25  1218   MAGNESIUM mg/dL 2.4 2.5* 2.4   PHOSPHORUS mg/dL 3.3 2.5 3.0     Results from last 7 days   Lab Units  02/06/25  0532   URIC ACID mg/dL 6.0*     Results from last 7 days   Lab Units 02/06/25  0532 02/05/25  0308 02/04/25  1218   WBC 10*3/mm3 5.95 6.34 5.12   HEMOGLOBIN g/dL 12.8 12.6 12.1   PLATELETS 10*3/mm3 192 196 205           Assessment / Plan     ASSESSMENT:  JUSTIN versus CKD, baseline renal function unknown.  Cr stable 1.7 to 1.9 thus far, 1.8 today.  In assoc with uncontrolled HTN, limited renal perfusion due to atherosclerotic disease (severe stenosis to R renal artery)   Severe hypothyroidism, TSH 90, T4 undetectable, started on levothyroxine, managed by primary team  BLE edema/PAD, evaluated by vascular surgery, no immediate surgical intervention suggested at this time.  Edema improved with IV lasix (last night and this AM)  Severe R renal artery stenosis, evaluated by vascular surgery.  Agree re: medical management   HTN, BP has improved; now 13s to 140s systolic w/o antihypertensive  Obesity  Weakness/mechanical falls likely from myxedema   Elevated troponin, evaluated by cardiology, no ischemic workup suggested    PLAN:  lV lasix given this AM, hold additional doses and will reassess vol status tomorrow AM      Marcell Ellis MD  02/06/25  13:30 EST    [facility]  [phone]

## 2025-02-06 NOTE — THERAPY EVALUATION
Patient Name: Rika Millan  : 1955    MRN: [id number]                              Today's Date: 2025       Admit Date: 2025    Visit Dx:     ICD-10-CM ICD-9-CM   1. JUSTIN (acute kidney injury)  N17.9 584.9   2. Leg swelling  M79.89 729.81   3. Hypertension, unspecified type  I10 401.9   4. Hypothyroidism, unspecified type  E03.9 244.9   5. Peripheral arterial disease  I73.9 443.9   6. Renal artery stenosis  I70.1 440.1     Patient Active Problem List   Diagnosis    JUSTIN (acute kidney injury)    Weakness    Hypothyroidism    High blood pressure    Lower extremity edema    Elevated troponin    Obesity (BMI 30-39.9)     History reviewed. No pertinent past medical history.  Past Surgical History:   Procedure Laterality Date    ORIF ANKLE FRACTURE Right     more than 10years ago      General Information       Row Name 25          Physical Therapy Time and Intention    Document Type evaluation  -MG     Mode of Treatment individual therapy;physical therapy  -MG       Row Name 25          General Information    Patient Profile Reviewed yes  -MG     Prior Level of Function independent:  Uses rollator. Has had 9 falls since 2024 due to her legs giving out or tripping over objects.  -MG     Existing Precautions/Restrictions fall  -MG     Barriers to Rehab none identified  -MG       Row Name 25          Living Environment    People in Home spouse  -MG       Row Name 25          Home Main Entrance    Number of Stairs, Main Entrance none  Ramp  -MG       Row Name 25 175          Stairs Within Home, Primary    Number of Stairs, Within Home, Primary none  -MG       Row Name 25 175          Cognition    Orientation Status (Cognition) oriented x 3  -MG       Row Name 25 175          Safety Issues/Impairments Affecting Functional Mobility    Safety Issues Affecting Function (Mobility) insight into deficits/self-awareness;safety precaution  awareness;sequencing abilities  -MG     Impairments Affecting Function (Mobility) balance;endurance/activity tolerance;strength  -MG     Comment, Safety Issues/Impairments (Mobility) Gait belt and non-skid socks donned.  -MG               User Key  (r) = Recorded By, (t) = Taken By, (c) = Cosigned By      Initials Name Provider Type    MG Remedios Lantigua, PT Physical Therapist                   Mobility       Row Name 02/06/25 1752          Bed Mobility    Supine-Sit [place] (Bed Mobility) standby assist;verbal cues  -MG     Sit-Supine [place] (Bed Mobility) minimum assist (75% patient effort);verbal cues  -MG     Assistive Device (Bed Mobility) bed rails;head of bed elevated  -MG     Comment, (Bed Mobility) Increased time to complete. Assist at LE to return to supine.  -MG       Row Name 02/06/25 1752          Sit-Stand Transfer    Sit-Stand [place] (Transfers) minimum assist (75% patient effort);contact guard;verbal cues  -MG     Assistive Device (Sit-Stand Transfers) walker, front-wheeled  -MG     Comment, (Sit-Stand Transfer) x3 from elevated EOB with progression from Angi to CGA. Cues for hand placement and sequencing.  -MG       Row Name 02/06/25 1752          Gait/Stairs (Locomotion)    [place] Level (Gait) minimum assist (75% patient effort)  -MG     Assistive Device (Gait) walker, front-wheeled  -MG     Distance in Feet (Gait) --  5 R side steps, 2' fwd/bwd and MIP x2  -MG     Deviations/Abnormal Patterns (Gait) gait speed decreased;stride length decreased;base of support, narrow;monica decreased  -MG     Bilateral Gait Deviations forward flexed posture;heel strike decreased  -MG     Comment, (Gait/Stairs) Seated rest break between each activity bout. Pt is quick to fatigue. With MIP she had a difficult time with R hip flexion. No dizziness or SOB.  -MG               User Key  (r) = Recorded By, (t) = Taken By, (c) = Cosigned By      Initials Name Provider Type    Remedios Tello, PT  Physical Therapist                   Obj/Interventions       [facility] Name 02/06/25 1754          Range of Motion Comprehensive    General Range of Motion bilateral lower extremity ROM WFL  -MG       [facility] Name 02/06/25 1754          Strength Comprehensive (MMT)    General Manual Muscle Testing (MMT) Assessment lower extremity strength deficits identified;upper extremity strength deficits identified  -MG     Comment, General Manual Muscle Testing (MMT) Assessment Generalized weakness. Grossly 4-/5.  -MG       [facility] Name 02/06/25 1754          Balance    Static Sitting Balance standby assist  -MG     Position, Sitting Balance sitting edge of bed  -MG     Static Standing Balance contact guard;minimal assist  -MG     Dynamic Standing Balance minimal assist;verbal cues  -MG     Position/Device Used, Standing Balance supported;walker, front-wheeled  -MG     Comment, Balance Mild unsteadiness. No overt LOB or buckling.  -MG       [facility] Name 02/06/25 1754          Sensory Assessment (Somatosensory)    Sensory Assessment (Somatosensory) sensation intact  Has neuropathy in her feet.  -MG               User Key  (r) = Recorded By, (t) = Taken By, (c) = Cosigned By      Initials Name Provider Type    MG Remedios Lantigua, PT Physical Therapist                   Goals/Plan       [facility] Name 02/06/25 1756          Bed Mobility Goal 1 (PT)    Activity/Assistive Device (Bed Mobility Goal 1, PT) bed mobility activities, all  -MG     [place] Level/Cues Needed (Bed Mobility Goal 1, PT) modified independence  -MG     Time Frame (Bed Mobility Goal 1, PT) 1 week  -MG       [facility] Name 02/06/25 1756          Transfer Goal 1 (PT)    Activity/Assistive Device (Transfer Goal 1, PT) transfers, all  -MG     [place] Level/Cues Needed (Transfer Goal 1, PT) supervision required;standby assist  -MG     Time Frame (Transfer Goal 1, PT) 1 week  -MG       [facility] Name 02/06/25 1756          Gait Training Goal 1 (PT)    Activity/Assistive Device (Gait Training  Goal 1, PT) gait (walking locomotion)  -MG     [place] Level (Gait Training Goal 1, PT) supervision required  -MG     Distance (Gait Training Goal 1, PT) 60  -MG     Time Frame (Gait Training Goal 1, PT) 1 week  -MG       Row Name 02/06/25 1756          Therapy Assessment/Plan (PT)    Planned Therapy Interventions (PT) balance training;bed mobility training;gait training;home exercise program;patient/family education;stretching;strengthening;neuromuscular re-education;ROM (range of motion);postural re-education;transfer training  -MG               User Key  (r) = Recorded By, (t) = Taken By, (c) = Cosigned By      Initials Name Provider Type    MG Remedios Lantigua, PT Physical Therapist                   Clinical Impression       Row Name 02/06/25 1755          Pain    Pretreatment Pain Rating 0/10 - no pain  -MG     Posttreatment Pain Rating 0/10 - no pain  -MG       Row Name 02/06/25 1755          Plan of Care Review    Plan of Care Reviewed With patient;spouse  -MG     Progress improving  -MG     Outcome Evaluation Pt is a 69 y.o. female with no significant PMH who was admitted to [facility] on 2/4/2025 with c/o weakness/falls. Patient is (I) at baseline, with use of a rollator and lives with her  who assists prn, in a house with a ramped entrance. Today, pt performed bed mobility with SBA up to Angi, required CG/Angi for transfers with a RW, and ambulated 5 R side steps, 2' fwd/bwd and standing MIP x2 with a seated rest break between each bout, a RW and CG/Angi. Pt is quick to fatigue, had no overt LOB or buckling, and had no c/o SOB or dizziness. Pt may benefit from skilled PT services acutely to address their impaired strength, balance and endurance, as well as, improve their level of independence prior to discharge. PEE w/ RN. Rec HH pending progress, upon DC.  -MG       Row Name 02/06/25 1755          Therapy Assessment/Plan (PT)    Rehab Potential (PT) good  -MG     Criteria for Skilled Interventions  Met (PT) yes  -MG     Therapy Frequency (PT) 5 times/wk  -MG       Row Name 02/06/25 1755          Positioning and Restraints    Pre-Treatment Position in bed  -MG     Post Treatment Position bed  -MG     In Bed notified nsg;supine;fowlers;call light within reach;encouraged to call for assist;exit alarm on;with family/caregiver;side rails up x3  -MG               User Key  (r) = Recorded By, (t) = Taken By, (c) = Cosigned By      Initials Name Provider Type    Remedios Tello, PT Physical Therapist                   Outcome Measures       Row Name 02/06/25 1756 02/06/25 0834       How much help from another person do you currently need...    Turning from your back to your side while in flat bed without using bedrails? 3  -MG 3  -LT    Moving from lying on back to sitting on the side of a flat bed without bedrails? 3  -MG 2  -LT    Moving to and from a bed to a chair (including a wheelchair)? 2  -MG 2  -LT    Standing up from a chair using your arms (e.g., wheelchair, bedside chair)? 2  -MG 2  -LT    Climbing 3-5 steps with a railing? 1  -MG 2  -LT    To walk in hospital room? 2  -MG 2  -LT    AM-PAC 6 Clicks Score (PT) 13  -MG 13  -LT              User Key  (r) = Recorded By, (t) = Taken By, (c) = Cosigned By      Initials Name Provider Type    Remedios Tello, PT Physical Therapist    LT Meg Allison RN Registered Nurse                                 Physical Therapy Education       Title: PT OT SLP Therapies (In Progress)       Topic: Physical Therapy (In Progress)       Point: Mobility training (Not Started)       Learner Progress:  Not documented in this visit.              Point: Home exercise program (Not Started)       Learner Progress:  Not documented in this visit.              Point: Body mechanics (Done)       Learning Progress Summary            Patient Acceptance, E, VU,NR by  at 2/6/2025 9151                      Point: Precautions (Done)       Learning Progress Summary            Patient  Acceptance, E, VU,NR by MG at 2/6/2025 1757                                      User Key       Initials Effective Dates Name Provider Type Discipline     05/24/22 -  Remedios Lantigua PT Physical Therapist PT                  PT Recommendation and Plan  Planned Therapy Interventions (PT): balance training, bed mobility training, gait training, home exercise program, patient/family education, stretching, strengthening, neuromuscular re-education, ROM (range of motion), postural re-education, transfer training  Progress: improving  Outcome Evaluation: Pt is a 69 y.o. female with no significant PMH who was admitted to [facility] on 2/4/2025 with c/o weakness/falls. Patient is (I) at baseline, with use of a rollator and lives with her  who assists prn, in a house with a ramped entrance. Today, pt performed bed mobility with SBA up to Angi, required CG/Angi for transfers with a RW, and ambulated 5 R side steps, 2' fwd/bwd and standing MIP x2 with a seated rest break between each bout, a RW and CG/Angi. Pt is quick to fatigue, had no overt LOB or buckling, and had no c/o SOB or dizziness. Pt may benefit from skilled PT services acutely to address their impaired strength, balance and endurance, as well as, improve their level of independence prior to discharge. PEE w/ RN. Rec HH pending progress, upon DC.     Time Calculation:         PT Charges       Row Name 02/06/25 1757             Time Calculation    Start Time 1302  -MG      Stop Time 1322  -MG      Time Calculation (min) 20 min  -MG      PT Received On 02/06/25  -MG      PT - Next Appointment 02/07/25  -MG      PT Goal Re-Cert Due Date 02/20/25  -MG         Time Calculation- PT    Total Timed Code Minutes- PT 10 minute(s)  -MG                User Key  (r) = Recorded By, (t) = Taken By, (c) = Cosigned By      Initials Name Provider Type     Remedios Lantigua, MARNI Physical Therapist                  Therapy Charges for Today       Code Description Service Date Service  Provider Modifiers Qty    63304478271 HC PT EVAL MOD COMPLEXITY 3 2/6/2025 Remedios Lantigua, PT GP 1    42083622176 HC PT THERAPEUTIC ACT EA 15 MIN 2/6/2025 Remedios Lantigua, PT GP 1            PT G-Codes  Outcome Measure Options: AM-PAC 6 Clicks Daily Activity (OT)  AM-PAC 6 Clicks Score (PT): 13  AM-PAC 6 Clicks Score (OT): 15  PT Discharge Summary  Anticipated Discharge Disposition (PT): home with home health, home with assist (Pending progress)    Remedios Lantigua, PT  2/6/2025

## 2025-02-06 NOTE — PROGRESS NOTES
"    Patient Name: Rika Millan  :1955  69 y.o.      Patient Care Team:  Provider, No Known as PCP - General    Chief Complaint: hypothyroidism    Interval History:    Echo:  Results for orders placed during the hospital encounter of 25    Adult Transthoracic Echo Complete W/ Cont if Necessary Per Protocol    Interpretation Summary    Left ventricular systolic function is low normal. Left ventricular ejection fraction appears to be 51 - 55%.    Left ventricular wall thickness is consistent with mild to moderate concentric hypertrophy.    Left ventricular diastolic function was normal.        Objective   Vital Signs  Temp:  [97.5 °F (36.4 °C)-97.9 °F (36.6 °C)] 97.5 °F (36.4 °C)  Heart Rate:  [67-87] 70  Resp:  [16-20] 18  BP: (137-155)/(78-92) 137/84    Intake/Output Summary (Last 24 hours) at 2025 1057  Last data filed at 2025 0555  Gross per 24 hour   Intake 720 ml   Output 2000 ml   Net -1280 ml     Flowsheet Rows      Flowsheet Row First Filed Value   Admission Height 160 cm (63\") Documented at 2025   Admission Weight 100 kg (220 lb 7.4 oz) Documented at 2025            Physical Exam:   General Appearance:    Alert, cooperative, in no acute distress   Lungs:     Clear to auscultation.  Normal respiratory effort and rate.      Heart:    Regular rhythm and normal rate, normal S1 and S2, no murmurs, gallops or rubs.     Chest Wall:    No abnormalities observed   Abdomen:     Soft, nontender, positive bowel sounds.     Extremities:   no cyanosis, clubbing or edema.  No marked joint deformities.  Adequate musculoskeletal strength.       Results Review:    Results from last 7 days   Lab Units 25  0532   SODIUM mmol/L 140   POTASSIUM mmol/L 4.3   CHLORIDE mmol/L 103   CO2 mmol/L 27.3   BUN mg/dL 22   CREATININE mg/dL 1.86*   GLUCOSE mg/dL 96   CALCIUM mg/dL 9.2     Results from last 7 days   Lab Units 25  1612 25  1218   HSTROP T ng/L 67* 77*     Results " from last 7 days   Lab Units 02/06/25  0532   WBC 10*3/mm3 5.95   HEMOGLOBIN g/dL 12.8   HEMATOCRIT % 38.4   PLATELETS 10*3/mm3 192         Results from last 7 days   Lab Units 02/06/25  0532   MAGNESIUM mg/dL 2.4     Results from last 7 days   Lab Units 02/06/25  0532   CHOLESTEROL mg/dL 458*   TRIGLYCERIDES mg/dL 152*   HDL CHOL mg/dL 80*   LDL CHOL mg/dL 348*               Medication Review:   aspirin, 81 mg, Oral, Daily  atorvastatin, 20 mg, Oral, Nightly  Eucerin original healing lotion, , Topical, Daily  Levothyroxine Sodium, 100 mcg, Intravenous, Daily  sodium chloride, 10 mL, Intravenous, Q12H              Assessment & Plan   Active Hospital Problems    Diagnosis  POA    **JUSTIN (acute kidney injury) [N17.9]  Yes    Weakness [R53.1]  Yes    Hypothyroidism [E03.9]  Yes    High blood pressure [I10]  Yes    Lower extremity edema [R60.0]  Unknown    Elevated troponin [R79.89]  Unknown    Obesity (BMI 30-39.9) [E66.9]  Unknown      Resolved Hospital Problems   No resolved problems to display.     1.  Acute hypothyroidism, severe, per primary team  2.  PAD-vascular surgery consulted  3.  Severe stenosis of the right renal artery, bilateral peripheral arterial disease  4.  Troponin above reference-in the setting of acute hypothyroidism, no angina pectoris, no acute changes on EKG, troponin levels are frequently elevated in the setting of acute severe hypothyroidism.  Echocardiogram normal.  No plans for ischemic evaluation currently  5.  Acute renal failure  6.  Elevated hypertension on arrival, no prior diagnosis of hypertension but no recent medical evaluation.  Improved.    No active cardiac issues, we will sign off, call if we can help in any way    Rios Chance III, MD  [facility]  02/06/25  10:57 EST

## 2025-02-06 NOTE — PROGRESS NOTES
"    DAILY PROGRESS NOTE  [facility]    Patient Identification:  Name: Rika Millan  Age: 69 y.o.  Sex: female  :  1955  MRN: [id number]         Primary Care Physician: Provider, No Known    Subjective:  Interval History: Feeling about the same as yesterday.  Improvement with swelling of lower extremities.  No complaints of chest pain troubles breathing confusion and or fever.  Denies any dysphagia    Objective: Conversational and pleasant.  Nontoxic.  No family at bedside    Scheduled Meds:aspirin, 81 mg, Oral, Daily  atorvastatin, 20 mg, Oral, Nightly  Eucerin original healing lotion, , Topical, Daily  Levothyroxine Sodium, 100 mcg, Intravenous, Daily  sodium chloride, 10 mL, Intravenous, Q12H      Continuous Infusions:     Vital signs in last 24 hours:  Temp:  [97.5 °F (36.4 °C)-97.9 °F (36.6 °C)] 97.5 °F (36.4 °C)  Heart Rate:  [67-87] 70  Resp:  [16-20] 18  BP: (137-155)/(78-92) 137/84    Intake/Output:    Intake/Output Summary (Last 24 hours) at 2025 1005  Last data filed at 2025 0555  Gross per 24 hour   Intake 720 ml   Output 2400 ml   Net -1680 ml       Exam:  /84 (BP Location: Left arm, Patient Position: Lying)   Pulse 70   Temp 97.5 °F (36.4 °C) (Oral)   Resp 18   Ht 160 cm (62.99\")   Wt 100 kg (220 lb 7.4 oz)   SpO2 97%   BMI 39.06 kg/m²     General Appearance:    Alert, cooperative, AAOx3                          Head:    Normocephalic, without obvious abnormality, atraumatic                           Eyes:    PERRLA/EOM's intact, both eyes                         Throat:   Oral mucosa pink and moist                           Neck:   Large diameter                         Lungs:    Clear to auscultation bilaterally, respirations unlabored                 Chest Wall:    No tenderness or deformity                          Heart:    Regular rate and rhythm, S1 and S2 [place]                  Abdomen:     Soft, nontender, bowel sounds active                 " Extremities: Stasis changes without cellulitis, edema much improved more so 1+.                        Pulses:   Pulses palpable in lower extremities                  Neurologic:   CNII-XII intact, moving all without FD     Data Review:  Labs in chart were reviewed.    Assessment:  Active Hospital Problems    Diagnosis  POA    **JUSTIN (acute kidney injury) [N17.9]  Yes    Weakness [R53.1]  Yes    Hypothyroidism [E03.9]  Yes    High blood pressure [I10]  Yes    Lower extremity edema [R60.0]  Unknown    Elevated troponin [R79.89]  Unknown    Obesity (BMI 30-39.9) [E66.9]  Unknown      Resolved Hospital Problems   No resolved problems to display.       Plan:    Severe hypothyroidism/myxedema   -IV levothyroxine and will start to trend TSH daily and switch back to p.o. pending those trends   -The above is driving a lot of her other issues   -Echo with normal EF/LVH/normal diastolic function   -ARF versus CKD-diuretics per renal-volume status much improved.  Creatinine 1.7-1.8 with a uric of 6.0 and stable electrolytes   -Labile hypertension/allow some permissive hypertension for now      Vascular has evaluated for PAD/stenotic lesions most severe right popliteal artery without evidence of any limb ischemia and otherwise asymptomatic   -Smoking cessation advised   -ASA 81 mg to be initiated   -Fasting lipid panel very poor with statin initiated   -Continue local wound care per the recommendations   -WENDY's with mild PAD bilaterally medical management endorsed   -Nothing surgical at this time per vascular    Abel Owens MD  2/6/2025  10:05 EST

## 2025-02-06 NOTE — PLAN OF CARE
Problem: Adult Inpatient Plan of Care  Goal: Plan of Care Review  Outcome: Progressing  Flowsheets (Taken 2/6/2025 8419)  Progress: improving  Outcome Evaluation: Patient is alert oriented x 4, room air. Fall risk prevention protocol maintained. Vascular, Cardio and Nephro on board.  Plan of Care Reviewed With: patient   Goal Outcome Evaluation:  Plan of Care Reviewed With: patient        Progress: improving  Outcome Evaluation: Patient is alert oriented x 4, room air. Fall risk prevention protocol maintained. Vascular, Cardio and Nephro on board.

## 2025-02-07 ENCOUNTER — APPOINTMENT (OUTPATIENT)
Dept: ULTRASOUND IMAGING | Facility: HOSPITAL | Age: 70
DRG: 644 | End: 2025-02-07
Payer: MEDICARE

## 2025-02-07 LAB
ALBUMIN SERPL-MCNC: 3.9 G/DL (ref 3.5–5.2)
ANION GAP SERPL CALCULATED.3IONS-SCNC: 10 MMOL/L (ref 5–15)
BUN SERPL-MCNC: 25 MG/DL (ref 8–23)
BUN/CREAT SERPL: 14.8 (ref 7–25)
CALCIUM SPEC-SCNC: 8.8 MG/DL (ref 8.6–10.5)
CHLORIDE SERPL-SCNC: 101 MMOL/L (ref 98–107)
CO2 SERPL-SCNC: 30 MMOL/L (ref 22–29)
CREAT SERPL-MCNC: 1.69 MG/DL (ref 0.57–1)
CREAT UR-MCNC: 118.7 MG/DL
EGFRCR SERPLBLD CKD-EPI 2021: 32.4 ML/MIN/1.73
GLUCOSE SERPL-MCNC: 96 MG/DL (ref 65–99)
PHOSPHATE SERPL-MCNC: 3.6 MG/DL (ref 2.5–4.5)
POTASSIUM SERPL-SCNC: 3.6 MMOL/L (ref 3.5–5.2)
PROT ?TM UR-MCNC: 28 MG/DL
PROT/CREAT UR: 235.9 MG/G CREA (ref 0–200)
SODIUM SERPL-SCNC: 141 MMOL/L (ref 136–145)
TSH SERPL DL<=0.05 MIU/L-ACNC: 81.1 UIU/ML (ref 0.27–4.2)
URATE SERPL-MCNC: 6.6 MG/DL (ref 2.4–5.7)

## 2025-02-07 PROCEDURE — 76775 US EXAM ABDO BACK WALL LIM: CPT

## 2025-02-07 PROCEDURE — 84550 ASSAY OF BLOOD/URIC ACID: CPT | Performed by: HOSPITALIST

## 2025-02-07 PROCEDURE — 97530 THERAPEUTIC ACTIVITIES: CPT

## 2025-02-07 PROCEDURE — 97535 SELF CARE MNGMENT TRAINING: CPT

## 2025-02-07 PROCEDURE — 84156 ASSAY OF PROTEIN URINE: CPT | Performed by: INTERNAL MEDICINE

## 2025-02-07 PROCEDURE — 80069 RENAL FUNCTION PANEL: CPT | Performed by: HOSPITALIST

## 2025-02-07 PROCEDURE — 84443 ASSAY THYROID STIM HORMONE: CPT | Performed by: HOSPITALIST

## 2025-02-07 PROCEDURE — 25010000002 LEVOTHYROXINE SODIUM 100 MCG/5ML SOLUTION: Performed by: HOSPITALIST

## 2025-02-07 PROCEDURE — 82570 ASSAY OF URINE CREATININE: CPT | Performed by: INTERNAL MEDICINE

## 2025-02-07 RX ADMIN — ASPIRIN 81 MG: 81 TABLET, COATED ORAL at 10:25

## 2025-02-07 RX ADMIN — ATORVASTATIN CALCIUM 20 MG: 20 TABLET, FILM COATED ORAL at 20:37

## 2025-02-07 RX ADMIN — Medication 10 ML: at 10:25

## 2025-02-07 RX ADMIN — POLYETHYLENE GLYCOL 3350 17 G: 17 POWDER, FOR SOLUTION ORAL at 16:01

## 2025-02-07 RX ADMIN — Medication 10 ML: at 20:37

## 2025-02-07 RX ADMIN — LEVOTHYROXINE SODIUM 100 MCG: 20 INJECTION, SOLUTION INTRAVENOUS at 10:24

## 2025-02-07 RX ADMIN — Medication: at 10:25

## 2025-02-07 NOTE — DISCHARGE PLACEMENT REQUEST
"Cheikh Millan (69 y.o. Female)       Date of Birth   1955    Social Security Number       Address   [address] [address]    Home Phone   [phone]    MRN   [id number]       Zoroastrianism   Scientologist    Marital Status                               Admission Date   2/4/25    Admission Type   Emergency    Admitting Provider   Pamela Cordon MD    Attending Provider   Abel Owens MD    Department, Room/Bed   [address], [address]/1       Discharge Date       Discharge Disposition       Discharge Destination                                 Attending Provider: Abel Owens MD    Allergies: Codeine, Morphine, Percocet [Oxycodone-acetaminophen]    Isolation: None   Infection: None   Code Status: CPR    Ht: 160 cm (62.99\")   Wt: 100 kg (220 lb 7.4 oz)    Admission Cmt: None   Principal Problem: JUSTIN (acute kidney injury) [N17.9]                   Active Insurance as of 2/4/2025       Primary Coverage       Payor Plan Insurance Group Employer/Plan Group    MEDICARE MEDICARE A & B        Payor Plan Address Payor Plan Phone Number Payor Plan Fax Number Effective Dates    [address] [phone]  8/1/2019 - None Entered    [place] [id number]         Subscriber Name Subscriber Birth Date Member ID       CHEIKH MILLAN 1955 [id number]                     Emergency Contacts        (Rel.) Home Phone Work Phone Mobile Phone    Adair Millan (Spouse) [phone] -- --                "

## 2025-02-07 NOTE — PLAN OF CARE
Goal Outcome Evaluation:  Plan of Care Reviewed With: patient        Progress: improving  Outcome Evaluation: Pt reports she feels more balanced today. However she still remains a signficant falls risk. She was only agreeable to getting up to the sink to brush her teeth. She has a LOB in standing during brief ADL as well as attempting turns to head back to bed. She needs multiple cues for safe technique and placement of the walker. OT encouraged her to get up to the chair which pt declined. She reports she has been primarly in bed since admission. She has been encouraged today again to start using the BSC and calling for toileting needs and discontinue use of purewick to increase her mobility and OOB ADL engagement.    Anticipated Discharge Disposition (OT): skilled nursing facility

## 2025-02-07 NOTE — THERAPY TREATMENT NOTE
Patient Name: Rika Millan  : 1955    MRN: [id number]                              Today's Date: 2025       Admit Date: 2025    Visit Dx:     ICD-10-CM ICD-9-CM   1. JUSTIN (acute kidney injury)  N17.9 584.9   2. Leg swelling  M79.89 729.81   3. Hypertension, unspecified type  I10 401.9   4. Hypothyroidism, unspecified type  E03.9 244.9   5. Peripheral arterial disease  I73.9 443.9   6. Renal artery stenosis  I70.1 440.1     Patient Active Problem List   Diagnosis    JUSTIN (acute kidney injury)    Weakness    Hypothyroidism    High blood pressure    Lower extremity edema    Elevated troponin    Obesity (BMI 30-39.9)     History reviewed. No pertinent past medical history.  Past Surgical History:   Procedure Laterality Date    ORIF ANKLE FRACTURE Right     more than 10years ago      General Information       Row Name 25 1544          Physical Therapy Time and Intention    Document Type therapy note (daily note)  -     Mode of Treatment physical therapy  -       Row Name 25 1544          General Information    Existing Precautions/Restrictions fall  -       Row Name 25 1544          Cognition    Orientation Status (Cognition) oriented x 3  -       Row Name 25 1544          Safety Issues/Impairments Affecting Functional Mobility    Impairments Affecting Function (Mobility) balance;endurance/activity tolerance;strength;postural/trunk control  -     Comment, Safety Issues/Impairments (Mobility) gt belt and non skid socks donned  -               User Key  (r) = Recorded By, (t) = Taken By, (c) = Cosigned By      Initials Name Provider Type     Zita Funes PTA Physical Therapist Assistant                   Mobility       Row Name 25 1544          Bed Mobility    Bed Mobility supine-sit;sit-supine  -PH     Supine-Sit [place] (Bed Mobility) standby assist  -PH     Sit-Supine [place] (Bed Mobility) standby assist  -PH     Assistive Device (Bed  Mobility) bed rails;head of bed elevated  -PH     Comment, (Bed Mobility) incr time  -PH       Row Name 02/07/25 1544          Sit-Stand Transfer    Sit-Stand [place] (Transfers) minimum assist (75% patient effort);verbal cues;nonverbal cues (demo/gesture);contact guard;1 person assist  -PH     Assistive Device (Sit-Stand Transfers) walker, front-wheeled  -PH     Comment, (Sit-Stand Transfer) 2x from EOB - from min A to CGA; cues for B hand placement  -PH       Row Name 02/07/25 1544          Gait/Stairs (Locomotion)    [place] Level (Gait) contact guard;minimum assist (75% patient effort);verbal cues;nonverbal cues (demo/gesture)  -PH     Assistive Device (Gait) walker, front-wheeled  -PH     Distance in Feet (Gait) 20  2x  -PH     Deviations/Abnormal Patterns (Gait) monica decreased;gait speed decreased;stride length decreased;base of support, wide  -PH     Bilateral Gait Deviations forward flexed posture;heel strike decreased  -PH     [place] Level (Stairs) unable to assess  -PH     Comment, (Gait/Stairs) pt slow and guarded; req a few min seated rest bet intervals 2/2 fatigue; cues for postural correction and to remain w/ fww until seated. Pt educ twice on imp of keeping hands on fww  as she reached to sink then later to bed rail.  -PH               User Key  (r) = Recorded By, (t) = Taken By, (c) = Cosigned By      Initials Name Provider Type    PH Zita Funes PTA Physical Therapist Assistant                   Obj/Interventions       Row Name 02/07/25 1547          Motor Skills    Therapeutic Exercise other (see comments)  BAP, LAQ, seated march; x 10 reps each  -PH       Row Name 02/07/25 1547          Balance    Balance Assessment sitting static balance;sitting dynamic balance  -PH     Static Sitting Balance standby assist  -PH     Dynamic Sitting Balance minimal assist;verbal cues;non-verbal cues (demo/gesture)  -PH     Static Standing Balance contact guard  -PH      Position/Device Used, Standing Balance walker, front-wheeled  -PH     Comment, Balance mild unsteadiness w/ pt guarded during amb; pt exhibited posterior lean when performing ther ex relying on heavy use of B UE for upright support; cues for postural correction  -PH               User Key  (r) = Recorded By, (t) = Taken By, (c) = Cosigned By      Initials Name Provider Type     Zita Funes PTA Physical Therapist Assistant                   Goals/Plan    No documentation.                  Clinical Impression       Row Name 02/07/25 1548          Pain    Pretreatment Pain Rating 0/10 - no pain  -PH     Posttreatment Pain Rating 0/10 - no pain  -PH       Row Name 02/07/25 1548          Plan of Care Review    Plan of Care Reviewed With patient;family  -     Progress improving  -     Outcome Evaluation Pt was seen for PT tx this PM. Pt sat up to EOB w/ SBA. Pt stood 2x from EOB w/ min A then CGA + fww. Pt amb approx 20' x 2 w/ CGA/min A and use of fww. Pt was very slow and guarded although no overt LOB. Pt  performed seated ther ex at EOB x 10 reps each. Pt returned to bed w/ SV. Encouraged pt to consider SNF after dc 2/2 hx of freq recent falls at home and to address weakness as well as activity intolerance. Pt's preference would be to return home w/ HH PT after dc.  -PH       Row Name 02/07/25 1548          Vital Signs    O2 Delivery Pre Treatment room air  -PH     O2 Delivery Intra Treatment room air  -PH     O2 Delivery Post Treatment room air  -PH       Row Name 02/07/25 1548          Positioning and Restraints    Pre-Treatment Position in bed  -PH     Post Treatment Position bed  -PH     In Bed notified nsg;fowlers;call light within reach;encouraged to call for assist;exit alarm on;with family/caregiver  -               User Key  (r) = Recorded By, (t) = Taken By, (c) = Cosigned By      Initials Name Provider Type     Zita Funes PTA Physical Therapist Assistant                    Outcome Measures       Row Name 02/07/25 1552 02/07/25 1025       How much help from another person do you currently need...    Turning from your back to your side while in flat bed without using bedrails? 4  -PH 3  -LT    Moving from lying on back to sitting on the side of a flat bed without bedrails? 3  -PH 3  -LT    Moving to and from a bed to a chair (including a wheelchair)? 3  -PH 3  -LT    Standing up from a chair using your arms (e.g., wheelchair, bedside chair)? 3  -PH 3  -LT    Climbing 3-5 steps with a railing? 2  -PH 2  -LT    To walk in hospital room? 3  -PH 2  -LT    AM-PAC 6 Clicks Score (PT) 18  -PH 16  -LT    Highest Level of Mobility Goal 6 --> Walk 10 steps or more  -PH 5 --> Static standing  -LT      Row Name 02/07/25 1552 02/07/25 1143       Functional Assessment    Outcome Measure Options AM-PAC 6 Clicks Basic Mobility (PT)  - AM-PAC 6 Clicks Daily Activity (OT)  -              User Key  (r) = Recorded By, (t) = Taken By, (c) = Cosigned By      Initials Name Provider Type    PH Zita Funes PTA Physical Therapist Assistant    Meg Perales RN Registered Nurse    Madyson Gallegos, MARIBEL Occupational Therapist                                 Physical Therapy Education       Title: PT OT SLP Therapies (Done)       Topic: Physical Therapy (Done)       Point: Mobility training (Done)       Learning Progress Summary            Patient Acceptance, E,TB,D, VU,NR by  at 2/7/2025 1552                      Point: Home exercise program (Done)       Learning Progress Summary            Patient Acceptance, E,TB,D, VU,NR by PH at 2/7/2025 1552                      Point: Body mechanics (Done)       Learning Progress Summary            Patient Acceptance, E,TB,D, VU,NR by PH at 2/7/2025 1552    Acceptance, E, VU,NR by  at 2/6/2025 1757                      Point: Precautions (Done)       Learning Progress Summary            Patient Acceptance, E,TB,D, VU,NR by  at 2/7/2025 1552     Acceptance, E, VU,NR by  at 2/6/2025 1757                                      User Key       Initials Effective Dates Name Provider Type Discipline     05/24/22 -  Remedios Lantigua PT Physical Therapist PT     06/16/21 -  Zita Funes PTA Physical Therapist Assistant PT                  PT Recommendation and Plan     Progress: improving  Outcome Evaluation: Pt was seen for PT tx this PM. Pt sat up to EOB w/ SBA. Pt stood 2x from EOB w/ min A then CGA + fww. Pt amb approx 20' x 2 w/ CGA/min A and use of fww. Pt was very slow and guarded although no overt LOB. Pt  performed seated ther ex at EOB x 10 reps each. Pt returned to bed w/ SV. Encouraged pt to consider SNF after dc 2/2 hx of freq recent falls at home and to address weakness as well as activity intolerance. Pt's preference would be to return home w/ HH PT after dc.     Time Calculation:         PT Charges       Row Name 02/07/25 1553             Time Calculation    Start Time 1451  -PH      Stop Time 1507  -PH      Time Calculation (min) 16 min  -PH      PT Received On 02/07/25  -PH      PT - Next Appointment 02/10/25  -PH         Timed Charges    45006 - PT Therapeutic Exercise Minutes 4  -PH      88252 - PT Therapeutic Activity Minutes 12  -PH         Total Minutes    Timed Charges Total Minutes 16  -PH       Total Minutes 16  -PH                User Key  (r) = Recorded By, (t) = Taken By, (c) = Cosigned By      Initials Name Provider Type    PH Zita Funes PTA Physical Therapist Assistant                  Therapy Charges for Today       Code Description Service Date Service Provider Modifiers Qty    14982207724  PT THERAPEUTIC ACT EA 15 MIN 2/7/2025 Zita Funes PTA GP 1            PT G-Codes  Outcome Measure Options: AM-PAC 6 Clicks Basic Mobility (PT)  AM-PAC 6 Clicks Score (PT): 18  AM-PAC 6 Clicks Score (OT): 15  PT Discharge Summary  Anticipated Discharge Disposition (PT): skilled nursing facility, home with  home health, home with assist (based on progress)    Zita Funes, PTA  2/7/2025

## 2025-02-07 NOTE — THERAPY TREATMENT NOTE
Patient Name: Rika Millan  : 1955    MRN: [id number]                              Today's Date: 2025       Admit Date: 2025    Visit Dx:     ICD-10-CM ICD-9-CM   1. JUSTIN (acute kidney injury)  N17.9 584.9   2. Leg swelling  M79.89 729.81   3. Hypertension, unspecified type  I10 401.9   4. Hypothyroidism, unspecified type  E03.9 244.9   5. Peripheral arterial disease  I73.9 443.9   6. Renal artery stenosis  I70.1 440.1     Patient Active Problem List   Diagnosis    JUSTIN (acute kidney injury)    Weakness    Hypothyroidism    High blood pressure    Lower extremity edema    Elevated troponin    Obesity (BMI 30-39.9)     History reviewed. No pertinent past medical history.  Past Surgical History:   Procedure Laterality Date    ORIF ANKLE FRACTURE Right     more than 10years ago      General Information       Row Name 25 1131          OT Time and Intention    Document Type therapy note (daily note)  -     Mode of Treatment occupational therapy;individual therapy  -       Row Name 25 1131          General Information    Patient Profile Reviewed yes  -SM     Existing Precautions/Restrictions fall  -       Row Name 25 1131          Cognition    Orientation Status (Cognition) oriented x 3  -       Row Name 25 1131          Safety Issues/Impairments Affecting Functional Mobility    Safety Issues Affecting Function (Mobility) positioning of assistive device;safety precaution awareness  -     Impairments Affecting Function (Mobility) balance;endurance/activity tolerance;strength  -               User Key  (r) = Recorded By, (t) = Taken By, (c) = Cosigned By      Initials Name Provider Type     Madyson Crabtree OT Occupational Therapist                     Mobility/ADL's       Row Name 25 1132          Bed Mobility    Supine-Sit [place] (Bed Mobility) standby assist  -SM     Sit-Supine [place] (Bed Mobility) standby assist  -SM     Assistive Device (Bed  Mobility) head of bed elevated;bed rails  -[facility] Name 02/07/25 1132          Sit-Stand Transfer    Sit-Stand [place] (Transfers) minimum assist (75% patient effort)  -     Assistive Device (Sit-Stand Transfers) walker, front-wheeled  -[facility] Name 02/07/25 1132          Functional Mobility    Functional Mobility- Ind. Level minimum assist (75% patient effort);1 person;contact guard assist  -     Functional Mobility- Device walker, front-wheeled  -     Functional Mobility-Distance (Feet) --  10  -[facility] Name 02/07/25 1132          Activities of Daily Living    BADL Assessment/Intervention grooming  -SM       Row Name 02/07/25 1132          Grooming Assessment/Training    [place] Level (Grooming) contact guard assist;oral care regimen  -     Position (Grooming) sink side;supported standing  -SM       Row Name 02/07/25 1132          Toileting Assessment/Training    [place] Level (Toileting) dependent (less than 25% patient effort)  -               User Key  (r) = Recorded By, (t) = Taken By, (c) = Cosigned By      Initials Name Provider Type    Madyson Gallegos OT Occupational Therapist                   Obj/Interventions       Row Name 02/07/25 1140          Motor Skills    Functional Endurance poor  -SM       Row Name 02/07/25 1140          Balance    Static Sitting Balance standby assist  -     Static Standing Balance contact guard;minimal assist  -     Dynamic Standing Balance minimal assist;contact guard  -     Position/Device Used, Standing Balance supported;walker, rolling  -               User Key  (r) = Recorded By, (t) = Taken By, (c) = Cosigned By      Initials Name Provider Type    Madyson Gallegos OT Occupational Therapist                   Goals/Plan    No documentation.                  Clinical Impression       Row Name 02/07/25 1140          Pain Assessment    Pretreatment Pain Rating 0/10 - no pain  -     Posttreatment Pain Rating 0/10 -  no pain  -SM       Row Name 02/07/25 1140          Plan of Care Review    Plan of Care Reviewed With patient  -SM     Progress improving  -     Outcome Evaluation Pt reports she feels more balanced today. However she still remains a signficant falls risk. She was only agreeable to getting up to the sink to brush her teeth. She has a LOB in standing during brief ADL as well as attempting turns to head back to bed. She needs multiple cues for safe technique and placement of the walker. OT encouraged her to get up to the chair which pt declined. She reports she has been primarly in bed since admission. She has been encouraged today again to start using the BSC and calling for toileting needs and discontinue use of purewick to increase her mobility and OOB ADL engagement.  -       Row Name 02/07/25 1140          Therapy Plan Review/Discharge Plan (OT)    Anticipated Discharge Disposition (OT) skilled nursing facility  -       Row Name 02/07/25 1140          Positioning and Restraints    Pre-Treatment Position in bed  -     Post Treatment Position bed  -     In Bed fowlers;encouraged to call for assist;exit alarm on;notified nsg;call light within reach  -               User Key  (r) = Recorded By, (t) = Taken By, (c) = Cosigned By      Initials Name Provider Type    SM Madyson Crabtree, OT Occupational Therapist                   Outcome Measures       Row Name 02/07/25 1144          How much help from another is currently needed...    Putting on and taking off regular lower body clothing? 2  -SM     Bathing (including washing, rinsing, and drying) 2  -SM     Toileting (which includes using toilet bed pan or urinal) 1  -SM     Putting on and taking off regular upper body clothing 3  -SM     Taking care of personal grooming (such as brushing teeth) 3  -SM     Eating meals 4  -SM     AM-PAC 6 Clicks Score (OT) 15  -SM       Row Name 02/07/25 114          Functional Assessment    Outcome Measure Options AM-PAC  6 Clicks Daily Activity (OT)  -               User Key  (r) = Recorded By, (t) = Taken By, (c) = Cosigned By      Initials Name Provider Type    Madyson Gallegos OT Occupational Therapist                    Occupational Therapy Education       Title: PT OT SLP Therapies (In Progress)       Topic: Occupational Therapy (Done)       Point: ADL training (Done)       Description:   Instruct learner(s) on proper safety adaptation and remediation techniques during self care or transfers.   Instruct in proper use of assistive devices.                  Learning Progress Summary            Patient Acceptance, E,TB, VU by SAI at 2/5/2025 2000    Acceptance, E, VU by PP at 2/5/2025 1136    Comment: Pt Ed on OT role and dc planning. Pt encouraged to continue progression with OOB ADLs to promote (I) to baseline function prior to dc home.   Family Acceptance, E,TB, VU by SAI at 2/5/2025 2000                      Point: Home exercise program (Done)       Description:   Instruct learner(s) on appropriate technique for monitoring, assisting and/or progressing therapeutic exercises/activities.                  Learning Progress Summary            Patient Acceptance, E,TB, VU by SAI at 2/5/2025 2000   Family Acceptance, E,TB, VU by SAI at 2/5/2025 2000                      Point: Precautions (Done)       Description:   Instruct learner(s) on prescribed precautions during self-care and functional transfers.                  Learning Progress Summary            Patient Acceptance, E,TB, VU by SAI at 2/5/2025 2000   Family Acceptance, E,TB, VU by SAI at 2/5/2025 2000                      Point: Body mechanics (Done)       Description:   Instruct learner(s) on proper positioning and spine alignment during self-care, functional mobility activities and/or exercises.                  Learning Progress Summary            Patient Acceptance, E,TB, VU by SAI at 2/5/2025 2000   Family Acceptance, E,TB, VU by SAI at 2/5/2025 2000                                       User Key       Initials Effective Dates Name Provider Type Discipline    PP 06/09/23 -  Eneida Chandra OT Occupational Therapist OT    SAI 10/03/24 -  Michael Slade, RN Registered Nurse Nurse                  OT Recommendation and Plan     Plan of Care Review  Plan of Care Reviewed With: patient  Progress: improving  Outcome Evaluation: Pt reports she feels more balanced today. However she still remains a signficant falls risk. She was only agreeable to getting up to the sink to brush her teeth. She has a LOB in standing during brief ADL as well as attempting turns to head back to bed. She needs multiple cues for safe technique and placement of the walker. OT encouraged her to get up to the chair which pt declined. She reports she has been primarly in bed since admission. She has been encouraged today again to start using the BSC and calling for toileting needs and discontinue use of purewick to increase her mobility and OOB ADL engagement.     Time Calculation:         Time Calculation- OT       Row Name 02/07/25 1144             Time Calculation- OT    OT Start Time 1037  -SM      OT Stop Time 1047  -SM      OT Time Calculation (min) 10 min  -SM      OT Received On 02/07/25  -      OT - Next Appointment 02/10/25  -         Timed Charges    70863 - OT Self Care/Mgmt Minutes 10  -SM         Total Minutes    Timed Charges Total Minutes 10  -SM       Total Minutes 10  -SM                User Key  (r) = Recorded By, (t) = Taken By, (c) = Cosigned By      Initials Name Provider Type     Madyson Crabtree, OT Occupational Therapist                  Therapy Charges for Today       Code Description Service Date Service Provider Modifiers Qty    64180046764  OT SELF CARE/MGMT/TRAIN EA 15 MIN 2/7/2025 Madyson Crabtree OT GO 1                 Madyson Crabtree OT  2/7/2025

## 2025-02-07 NOTE — PLAN OF CARE
Goal Outcome Evaluation:  Plan of Care Reviewed With: patient, family        Progress: improving  Outcome Evaluation: Pt was seen for PT tx this PM. Pt sat up to EOB w/ SBA. Pt stood 2x from EOB w/ min A then CGA + fww. Pt amb approx 20' x 2 w/ CGA/min A and use of fww. Pt was very slow and guarded although no overt LOB. Pt  performed seated ther ex at EOB x 10 reps each. Pt returned to bed w/ SV. Encouraged pt to consider SNF after dc 2/2 hx of freq recent falls at home and to address weakness as well as activity intolerance. Pt's preference would be to return home w/ HH PT after dc.    Anticipated Discharge Disposition (PT): skilled nursing facility, home with home health, home with assist (based on progress)

## 2025-02-07 NOTE — PROGRESS NOTES
[facility] Progress Note      Patient Name: Rika Millan  : 1955  MRN: [id number]  Primary Care Physician:  Provider, No Known  Date of admission: 2025    Subjective     Interval History:   F/u JUSTIN    Review of Systems:   UOP 2200   Swelling down  No dyspnea, on RA    Objective     Vitals:   Temp:  [97.5 °F (36.4 °C)-98.2 °F (36.8 °C)] 98.1 °F (36.7 °C)  Heart Rate:  [67-71] 70  Resp:  [18] 18  BP: (126-141)/(77-97) 137/79    Intake/Output Summary (Last 24 hours) at 2025 0822  Last data filed at 2025 2337  Gross per 24 hour   Intake --   Output 2200 ml   Net -2200 ml       Physical Exam:    General Appearance: alert, comfortable  Neck: supple, no JVD  Lungs: CTA bilat no rales  Heart: RRR, normal S1 and S2  Abdomen: soft, nontender, nondistended  : no palpable bladder  Extremities: 1+ BLE edema      Scheduled Meds:     aspirin, 81 mg, Oral, Daily  atorvastatin, 20 mg, Oral, Nightly  Eucerin original healing lotion, , Topical, Daily  Levothyroxine Sodium, 100 mcg, Intravenous, Daily  sodium chloride, 10 mL, Intravenous, Q12H      IV Meds:        Results Reviewed:   I have personally reviewed the results from the time of this admission to 2025 08:22 EST     Results from last 7 days   Lab Units 25  0459 25  0532 25  0308 25  1218   SODIUM mmol/L 141 140 140 141   POTASSIUM mmol/L 3.6 4.3 4.4 4.6   CHLORIDE mmol/L 101 103 105 103   CO2 mmol/L 30.0* 27.3 27.2 26.7   BUN mg/dL 25* 22 23 28*   CREATININE mg/dL 1.69* 1.86* 1.78* 1.94*   CALCIUM mg/dL 8.8 9.2 8.9 9.6   BILIRUBIN mg/dL  --   --   --  0.9   ALK PHOS U/L  --   --   --  81   ALT (SGPT) U/L  --   --   --  11   AST (SGOT) U/L  --   --   --  27   GLUCOSE mg/dL 96 96 122* 102*     Estimated Creatinine Clearance: 34.9 mL/min (A) (by C-G formula based on SCr of 1.69 mg/dL (H)).  Results from last 7 days   Lab Units 25  0459 25  0532 25  0308 25  1218   MAGNESIUM  mg/dL  --  2.4 2.5* 2.4   PHOSPHORUS mg/dL 3.6 3.3 2.5 3.0     Results from last 7 days   Lab Units 02/07/25  0459 02/06/25  0532   URIC ACID mg/dL 6.6* 6.0*     Results from last 7 days   Lab Units 02/06/25  0532 02/05/25  0308 02/04/25  1218   WBC 10*3/mm3 5.95 6.34 5.12   HEMOGLOBIN g/dL 12.8 12.6 12.1   PLATELETS 10*3/mm3 192 196 205           Assessment / Plan     ASSESSMENT:  Non olig JUSTIN - Cr improved slightly 1.6, peak 1.9.  K down to 3.6 with diuresis and bicarb up slightly to 30.  UA: small blood, 30 mg/dL protein  CKD stage 3A - BL Cr 1.1 as of June 2024, in assoc with renovascular disease (severe R renal artery stenosis)  Severe hypothyroidism/myxedema, TSH 90, T4 undetectable, started on levothyroxine, managed by primary team  BLE edema - due to #3.  Echo unremarkable.  Diuresed well with IV lasix, edema improving and has not assoc dyspnea - hold diuretic today  Severe R renal artery stenosis, evaluated by vascular surgery.  Agree to medical management   HTN, BP has improved; now 130s to 140s systolic w/o antihypertensive  PVD - CTA noted re: severe R popliteal stenosis, moderate L popliteal stenosis, severe stenosis of origin right renal artery   Weakness/mechanical falls likely from myxedema   Elevated troponin, evaluated by cardiology, no ischemic workup suggested    PLAN:  Hold diuretics  KCL 20 meq PO x1  Obtain further CKD w/u while adjust thyroid replacement, ie check renal US, SPEP/IF, pr/cr ratio      Marcell Ellis MD  02/07/25  08:22 [facility]    [facility]  [phone]

## 2025-02-07 NOTE — PROGRESS NOTES
"    DAILY PROGRESS NOTE  [facility]    Patient Identification:  Name: Rika Millan  Age: 69 y.o.  Sex: female  :  1955  MRN: [id number]         Primary Care Physician: Provider, No Known    Subjective:  Interval History: Patient continues to feel bit better overall.  Denies any problems swallowing nausea vomiting confusion or fever.  Happy with swelling improvement.  Denies any chest pain or troubles breathing    Objective: Clinically starting to look better.  Conversational and pleasant.  Nontoxic in appearance.  No family at bedside    Scheduled Meds:aspirin, 81 mg, Oral, Daily  atorvastatin, 20 mg, Oral, Nightly  Eucerin original healing lotion, , Topical, Daily  Levothyroxine Sodium, 100 mcg, Intravenous, Daily  sodium chloride, 10 mL, Intravenous, Q12H      Continuous Infusions:     Vital signs in last 24 hours:  Temp:  [97.9 °F (36.6 °C)-98.2 °F (36.8 °C)] 98.1 °F (36.7 °C)  Heart Rate:  [67-71] 70  Resp:  [18] 18  BP: (126-141)/(77-97) 137/79    Intake/Output:    Intake/Output Summary (Last 24 hours) at 2025 1013  Last data filed at 2025 2337  Gross per 24 hour   Intake --   Output 2200 ml   Net -2200 ml       Exam:  /79 (BP Location: Right arm, Patient Position: Lying)   Pulse 70   Temp 98.1 °F (36.7 °C) (Oral)   Resp 18   Ht 160 cm (62.99\")   Wt 100 kg (220 lb 7.4 oz)   SpO2 98%   BMI 39.06 kg/m²     General Appearance:    Alert, cooperative, AAOx3                         Throat:   Oral mucosa pink and moist                           Neck:   Supple, symmetrical, trachea midline, no JVD, large diameter                         Lungs:    Clear to auscultation bilaterally, respirations unlabored                          Heart:    Regular rate and rhythm, S1 and S2 normal                  Abdomen:     Soft, nontender, bowel sounds active                 Extremities: Improved bilateral lower extremity edema now trace/1+                        Pulses:   Pulses palpable " in lower extremities                            Skin:   Skin is warm and dry, stasis changes to legs without cellulitis                  neurologic:   CNII-XII intact, no focal deficits noted     Data Review:  Labs in chart were reviewed.    Assessment:  Active Hospital Problems    Diagnosis  POA    **Hypothyroidism [E03.9]  Yes    JUSTIN (acute kidney injury) [N17.9]  Yes    Weakness [R53.1]  Yes    High blood pressure [I10]  Yes    Lower extremity edema [R60.0]  Unknown    Elevated troponin [R79.89]  Unknown    Obesity (BMI 30-39.9) [E66.9]  Unknown      Resolved Hospital Problems   No resolved problems to display.       Plan:    Severe hypothyroidism/myxedema              -IV levothyroxine and trending TSH daily and that is starting to show downward trend.  Once closer to level of 10, can switch to p.o. then              -The above is driving a lot of her other issues              -Echo with normal EF/LVH/normal diastolic function              -ARF versus CKD-diuretics per renal-volume status much improved.  Creatinine 1.7-1.8-1.6 with a uric of 6.0-6.66 and stable electrolytes              -Labile hypertension/allow some permissive hypertension for now.  Blood pressure settling down currently 137/79   -Additional serology noted per renal with renal ultrasound        Vascular has evaluated for PAD/stenotic lesions most severe right popliteal artery/right renal artery without evidence of any limb ischemia and otherwise asymptomatic              -Smoking cessation advised              -ASA 81 mg to be initiated              -Fasting lipid panel very poor with statin initiated              -Continue local wound care per the recommendations              -WENDY's with mild PAD bilaterally medical management endorsed              -Nothing surgical at this time per vascular      Disposition -anticipate this patient will be here through the weekend on IV levothyroxine hopefully switching to p.o. this upcoming workweek pending  clinical improvement and further lab trends    Abel Owens MD  2/7/2025  10:13 EST

## 2025-02-07 NOTE — CASE MANAGEMENT/SOCIAL WORK
Continued Stay Note  [facility]     Patient Name: iRka Millan  MRN: [id number]  Today's Date: 2/7/2025    Admit Date: 2/4/2025    Plan: Home with family, [place]    Discharge Plan       Row Name 02/07/25 0952       Plan    Plan Home with family, [place]     Patient/Family in Agreement with Plan yes    Plan Comments CCP met with pt at the bedside to discuss DC. CCP explained PT recs for HH. Pt verbalized understanding and denies preference of HH. CCP also discussed pt choice list for SNF referrals if pt decided on SNF at DC. Pt verbalized understanding. [facility] notified of referral and able to accept. [facility] stated she would also assist pt in arranging new PCP. Melony HARRIS/CCP                   Discharge Codes    No documentation.                 Expected Discharge Date and Time       Expected Discharge Date Expected Discharge Time    Feb 7, 2025               Brina Trevizo RN

## 2025-02-07 NOTE — PLAN OF CARE
Goal Outcome Evaluation:  Plan of Care Reviewed With: patient        Progress: no change  Outcome Evaluation: Pt alert and orinted with mild forgetfulness. Vitals stable, sinus arrythmia on monitor. Edema +2-+3 to BLE.

## 2025-02-08 PROBLEM — E66.812 CLASS 2 SEVERE OBESITY WITH SERIOUS COMORBIDITY IN ADULT: Status: ACTIVE | Noted: 2025-02-08

## 2025-02-08 PROBLEM — E66.01 CLASS 2 SEVERE OBESITY WITH SERIOUS COMORBIDITY IN ADULT: Status: ACTIVE | Noted: 2025-02-08

## 2025-02-08 PROBLEM — N18.31 STAGE 3A CHRONIC KIDNEY DISEASE: Status: ACTIVE | Noted: 2025-02-08

## 2025-02-08 LAB
ALBUMIN SERPL-MCNC: 3.9 G/DL (ref 3.5–5.2)
ANION GAP SERPL CALCULATED.3IONS-SCNC: 11.7 MMOL/L (ref 5–15)
BUN SERPL-MCNC: 26 MG/DL (ref 8–23)
BUN/CREAT SERPL: 17.2 (ref 7–25)
CALCIUM SPEC-SCNC: 9 MG/DL (ref 8.6–10.5)
CHLORIDE SERPL-SCNC: 99 MMOL/L (ref 98–107)
CO2 SERPL-SCNC: 27.3 MMOL/L (ref 22–29)
CREAT SERPL-MCNC: 1.51 MG/DL (ref 0.57–1)
EGFRCR SERPLBLD CKD-EPI 2021: 37 ML/MIN/1.73
GLUCOSE SERPL-MCNC: 108 MG/DL (ref 65–99)
PHOSPHATE SERPL-MCNC: 3.5 MG/DL (ref 2.5–4.5)
POTASSIUM SERPL-SCNC: 3.9 MMOL/L (ref 3.5–5.2)
SODIUM SERPL-SCNC: 138 MMOL/L (ref 136–145)
TSH SERPL DL<=0.05 MIU/L-ACNC: 77 UIU/ML (ref 0.27–4.2)

## 2025-02-08 PROCEDURE — 84155 ASSAY OF PROTEIN SERUM: CPT | Performed by: INTERNAL MEDICINE

## 2025-02-08 PROCEDURE — 84165 PROTEIN E-PHORESIS SERUM: CPT | Performed by: INTERNAL MEDICINE

## 2025-02-08 PROCEDURE — 25010000002 LEVOTHYROXINE SODIUM 100 MCG/5ML SOLUTION: Performed by: HOSPITALIST

## 2025-02-08 PROCEDURE — 80069 RENAL FUNCTION PANEL: CPT | Performed by: INTERNAL MEDICINE

## 2025-02-08 PROCEDURE — 84443 ASSAY THYROID STIM HORMONE: CPT | Performed by: HOSPITALIST

## 2025-02-08 PROCEDURE — 86334 IMMUNOFIX E-PHORESIS SERUM: CPT | Performed by: INTERNAL MEDICINE

## 2025-02-08 PROCEDURE — 25010000002 ENOXAPARIN PER 10 MG: Performed by: INTERNAL MEDICINE

## 2025-02-08 PROCEDURE — 82784 ASSAY IGA/IGD/IGG/IGM EACH: CPT | Performed by: INTERNAL MEDICINE

## 2025-02-08 RX ORDER — ENOXAPARIN SODIUM 100 MG/ML
40 INJECTION SUBCUTANEOUS EVERY 24 HOURS
Status: DISCONTINUED | OUTPATIENT
Start: 2025-02-08 | End: 2025-02-11 | Stop reason: HOSPADM

## 2025-02-08 RX ORDER — LEVOTHYROXINE SODIUM 50 UG/1
50 TABLET ORAL
Status: DISCONTINUED | OUTPATIENT
Start: 2025-02-09 | End: 2025-02-11 | Stop reason: HOSPADM

## 2025-02-08 RX ADMIN — ENOXAPARIN SODIUM 40 MG: 100 INJECTION SUBCUTANEOUS at 16:52

## 2025-02-08 RX ADMIN — Medication 10 ML: at 09:52

## 2025-02-08 RX ADMIN — LEVOTHYROXINE SODIUM 100 MCG: 20 INJECTION, SOLUTION INTRAVENOUS at 09:51

## 2025-02-08 RX ADMIN — ATORVASTATIN CALCIUM 20 MG: 20 TABLET, FILM COATED ORAL at 21:05

## 2025-02-08 RX ADMIN — ASPIRIN 81 MG: 81 TABLET, COATED ORAL at 09:52

## 2025-02-08 RX ADMIN — Medication: at 09:52

## 2025-02-08 RX ADMIN — Medication 10 ML: at 21:05

## 2025-02-08 NOTE — PROGRESS NOTES
[facility] Progress Note      Patient Name: Rika Millan  : 1955  MRN: [id number]  Primary Care Physician:  Provider, No Known  Date of admission: 2025    Subjective     Interval History:   F/u JUSTIN    Review of Systems:     Patient resting comfortably.  Denies any new complaint  Edema improving slowly per patient    Objective     Vitals:   Temp:  [97.5 °F (36.4 °C)-98.3 °F (36.8 °C)] 98.2 °F (36.8 °C)  Heart Rate:  [71] 71  Resp:  [16-18] 16  BP: (129-138)/(74-88) 138/87    Intake/Output Summary (Last 24 hours) at 2025 0848  Last data filed at 2025 2100  Gross per 24 hour   Intake --   Output 400 ml   Net -400 ml       Physical Exam:    General Appearance: alert, comfortable  Neck: supple, no JVD  Lungs: CTA bilat no rales  Heart: RRR, normal S1 and S2  Abdomen: soft, nontender, nondistended  : no palpable bladder  Extremities: 1+ BLE edema      Scheduled Meds:     aspirin, 81 mg, Oral, Daily  atorvastatin, 20 mg, Oral, Nightly  Eucerin original healing lotion, , Topical, Daily  Levothyroxine Sodium, 100 mcg, Intravenous, Daily  sodium chloride, 10 mL, Intravenous, Q12H      IV Meds:        Results Reviewed:   I have personally reviewed the results from the time of this admission to 2025 08:48 EST     Results from last 7 days   Lab Units 25  0655 25  0459 25  0532 25  0308 25  1218   SODIUM mmol/L 138 141 140   < > 141   POTASSIUM mmol/L 3.9 3.6 4.3   < > 4.6   CHLORIDE mmol/L 99 101 103   < > 103   CO2 mmol/L 27.3 30.0* 27.3   < > 26.7   BUN mg/dL 26* 25* 22   < > 28*   CREATININE mg/dL 1.51* 1.69* 1.86*   < > 1.94*   CALCIUM mg/dL 9.0 8.8 9.2   < > 9.6   BILIRUBIN mg/dL  --   --   --   --  0.9   ALK PHOS U/L  --   --   --   --  81   ALT (SGPT) U/L  --   --   --   --  11   AST (SGOT) U/L  --   --   --   --  27   GLUCOSE mg/dL 108* 96 96   < > 102*    < > = values in this interval not displayed.     Estimated Creatinine Clearance:  39.1 mL/min (A) (by C-G formula based on SCr of 1.51 mg/dL (H)).  Results from last 7 days   Lab Units 02/08/25  0655 02/07/25  0459 02/06/25  0532 02/05/25  0308 02/04/25  1218   MAGNESIUM mg/dL  --   --  2.4 2.5* 2.4   PHOSPHORUS mg/dL 3.5 3.6 3.3 2.5 3.0     Results from last 7 days   Lab Units 02/07/25  0459 02/06/25  0532   URIC ACID mg/dL 6.6* 6.0*     Results from last 7 days   Lab Units 02/06/25  0532 02/05/25  0308 02/04/25  1218   WBC 10*3/mm3 5.95 6.34 5.12   HEMOGLOBIN g/dL 12.8 12.6 12.1   PLATELETS 10*3/mm3 192 196 205           Assessment / Plan     ASSESSMENT:  Non olig JUSTIN -improving slowly.  Creatinine 1.5 this morning from peak of 1.9.  Electrolytes okay.  Renal ultrasound okay  CKD stage 3A - BL Cr 1.1 as of June 2024, in assoc with renovascular disease (severe R renal artery stenosis)  Severe hypothyroidism/myxedema, TSH 90, T4 undetectable, started on levothyroxine, managed by primary team  BLE edema - due to #3.  Echo unremarkable.  Diuresed well with IV lasix, edema improving and has not assoc dyspnea - hold diuretic today  Severe R renal artery stenosis, evaluated by vascular surgery.  Agree to medical management   HTN, BP has improved; now 130s to 140s systolic w/o antihypertensive  PVD - CTA noted re: severe R popliteal stenosis, moderate L popliteal stenosis, severe stenosis of origin right renal artery   Weakness/mechanical falls likely from myxedema   Elevated troponin, evaluated by cardiology, no ischemic workup suggested    PLAN:  Keep off diuretics for now  Repeat labs in a.m.      Brent Giles MD  02/08/25  08:48 EST    [facility]  [phone]

## 2025-02-09 PROBLEM — R32 URINE INCONTINENCE: Status: ACTIVE | Noted: 2025-02-09

## 2025-02-09 LAB
ALBUMIN SERPL-MCNC: 4 G/DL (ref 3.5–5.2)
ANION GAP SERPL CALCULATED.3IONS-SCNC: 11 MMOL/L (ref 5–15)
BUN SERPL-MCNC: 24 MG/DL (ref 8–23)
BUN/CREAT SERPL: 17.6 (ref 7–25)
CALCIUM SPEC-SCNC: 9.2 MG/DL (ref 8.6–10.5)
CHLORIDE SERPL-SCNC: 103 MMOL/L (ref 98–107)
CO2 SERPL-SCNC: 29 MMOL/L (ref 22–29)
CREAT SERPL-MCNC: 1.36 MG/DL (ref 0.57–1)
EGFRCR SERPLBLD CKD-EPI 2021: 42 ML/MIN/1.73
GLUCOSE SERPL-MCNC: 112 MG/DL (ref 65–99)
PHOSPHATE SERPL-MCNC: 3.2 MG/DL (ref 2.5–4.5)
POTASSIUM SERPL-SCNC: 4.7 MMOL/L (ref 3.5–5.2)
SODIUM SERPL-SCNC: 143 MMOL/L (ref 136–145)

## 2025-02-09 PROCEDURE — 25010000002 ENOXAPARIN PER 10 MG: Performed by: INTERNAL MEDICINE

## 2025-02-09 PROCEDURE — 80069 RENAL FUNCTION PANEL: CPT | Performed by: INTERNAL MEDICINE

## 2025-02-09 RX ORDER — BISACODYL 10 MG
10 SUPPOSITORY, RECTAL RECTAL DAILY PRN
Status: DISCONTINUED | OUTPATIENT
Start: 2025-02-09 | End: 2025-02-10

## 2025-02-09 RX ORDER — AMOXICILLIN 250 MG
2 CAPSULE ORAL 2 TIMES DAILY
Status: DISCONTINUED | OUTPATIENT
Start: 2025-02-09 | End: 2025-02-10

## 2025-02-09 RX ORDER — OXYBUTYNIN CHLORIDE 5 MG/1
5 TABLET, EXTENDED RELEASE ORAL DAILY
Status: DISCONTINUED | OUTPATIENT
Start: 2025-02-09 | End: 2025-02-11 | Stop reason: HOSPADM

## 2025-02-09 RX ORDER — POLYETHYLENE GLYCOL 3350 17 G/17G
17 POWDER, FOR SOLUTION ORAL DAILY
Status: DISCONTINUED | OUTPATIENT
Start: 2025-02-10 | End: 2025-02-10

## 2025-02-09 RX ORDER — BISACODYL 5 MG/1
5 TABLET, DELAYED RELEASE ORAL DAILY PRN
Status: DISCONTINUED | OUTPATIENT
Start: 2025-02-09 | End: 2025-02-10

## 2025-02-09 RX ADMIN — POLYETHYLENE GLYCOL 3350 17 G: 17 POWDER, FOR SOLUTION ORAL at 20:25

## 2025-02-09 RX ADMIN — SENNOSIDES AND DOCUSATE SODIUM 2 TABLET: 50; 8.6 TABLET ORAL at 18:31

## 2025-02-09 RX ADMIN — ENOXAPARIN SODIUM 40 MG: 100 INJECTION SUBCUTANEOUS at 16:49

## 2025-02-09 RX ADMIN — Medication 10 ML: at 08:44

## 2025-02-09 RX ADMIN — ASPIRIN 81 MG: 81 TABLET, COATED ORAL at 08:44

## 2025-02-09 RX ADMIN — SENNOSIDES AND DOCUSATE SODIUM 2 TABLET: 50; 8.6 TABLET ORAL at 22:11

## 2025-02-09 RX ADMIN — Medication: at 08:44

## 2025-02-09 RX ADMIN — OXYBUTYNIN CHLORIDE 5 MG: 5 TABLET, EXTENDED RELEASE ORAL at 16:49

## 2025-02-09 RX ADMIN — ATORVASTATIN CALCIUM 20 MG: 20 TABLET, FILM COATED ORAL at 20:25

## 2025-02-09 RX ADMIN — LEVOTHYROXINE SODIUM 50 MCG: 50 TABLET ORAL at 05:56

## 2025-02-09 NOTE — PROGRESS NOTES
[facility] Medicine Services  PROGRESS NOTE    Patient Name: Rika Millan  : 1955  MRN: [id number]    Date of Admission: 2025  Primary Care Physician: Provider, No Known    Subjective   Subjective     CC:  Follow-up myxedema    Subjective:  Patient still feels weak but is gradually getting little bit better.  She is complaining of some overactive bladder and urine incontinence.  She is working to get to urology clinic and says she has an appointment.  She says that her  is very forgetful but that she will be able to manage her on medications and agrees to get a pill organizer.  She does not quite feel strong enough to go home today but thinks she may be ready by tomorrow.    Review of Systems  No current fevers or chills  No current shortness of breath or cough  No current nausea, vomiting, or diarrhea  No current chest pain or palpitations       Objective   Objective     Vital Signs:   Temp:  [97.9 °F (36.6 °C)-98.6 °F (37 °C)] 98.6 °F (37 °C)  Heart Rate:  [73] 73  Resp:  [16-18] 18  BP: (137-153)/(67-93) 139/75        Physical Exam:  Constitutional: Awake, alert, elderly appearing  HENT: NCAT, mucous membranes moist, neck supple  Respiratory: No cough or wheezes, normal respirations, nonlabored breathing   Cardiovascular: Pulse rate is normal, palpable radial pulses  Gastrointestinal:  Soft, nontender, nondistended  Musculoskeletal: Significantly obese, BMI is 40, debilitated appearance, some lower extremity edema is present  Psychiatric: Appropriate affect, cooperative, conversational  Neurologic: No slurred speech or facial droop, follows commands  Skin: No rashes or jaundice, warm      Results Reviewed:  Results from last 7 days   Lab Units 25  0532 25  0308 25  1218   WBC 10*3/mm3 5.95 6.34 5.12   HEMOGLOBIN g/dL 12.8 12.6 12.1   HEMATOCRIT % 38.4 37.4 38.6   PLATELETS 10*3/mm3 192 196 205     Results from last 7 days   Lab Units 25  0658 25  0655  02/07/25  0459 02/05/25  0308 02/04/25  1612 02/04/25  1218   SODIUM mmol/L 143 138 141   < >  --  141   POTASSIUM mmol/L 4.7 3.9 3.6   < >  --  4.6   CHLORIDE mmol/L 103 99 101   < >  --  103   CO2 mmol/L 29.0 27.3 30.0*   < >  --  26.7   BUN mg/dL 24* 26* 25*   < >  --  28*   CREATININE mg/dL 1.36* 1.51* 1.69*   < >  --  1.94*   GLUCOSE mg/dL 112* 108* 96   < >  --  102*   CALCIUM mg/dL 9.2 9.0 8.8   < >  --  9.6   ALK PHOS U/L  --   --   --   --   --  81   ALT (SGPT) U/L  --   --   --   --   --  11   AST (SGOT) U/L  --   --   --   --   --  27   HSTROP T ng/L  --   --   --   --  67* 77*   PROBNP pg/mL  --   --   --   --   --  261.0    < > = values in this interval not displayed.     Estimated Creatinine Clearance: 43.4 mL/min (A) (by C-G formula based on SCr of 1.36 mg/dL (H)).    Microbiology Results Abnormal       None            Imaging Results (Last 24 Hours)       ** No results found for the last 24 hours. **            Results for orders placed during the hospital encounter of 02/04/25    Adult Transthoracic Echo Complete W/ Cont if Necessary Per Protocol    Interpretation Summary    Left ventricular systolic function is low normal. Left ventricular ejection fraction appears to be 51 - 55%.    Left ventricular wall thickness is consistent with mild to moderate concentric hypertrophy.    Left ventricular diastolic function was normal.      I have reviewed the medications:  Scheduled Meds:aspirin, 81 mg, Oral, Daily  atorvastatin, 20 mg, Oral, Nightly  enoxaparin, 40 mg, Subcutaneous, Q24H  Eucerin original healing lotion, , Topical, Daily  levothyroxine, 50 mcg, Oral, Q AM  oxybutynin XL, 5 mg, Oral, Daily  sodium chloride, 10 mL, Intravenous, Q12H      Continuous Infusions:   PRN Meds:.  acetaminophen **OR** acetaminophen **OR** acetaminophen    senna-docusate sodium **AND** polyethylene glycol **AND** bisacodyl **AND** bisacodyl    melatonin    ondansetron ODT **OR** ondansetron    sodium chloride    sodium  chloride    Assessment & Plan   Assessment & Plan     Active Hospital Problems    Diagnosis  POA    **Hypothyroidism [E03.9]  Yes    Urine incontinence [R32]  Yes    Class 2 severe obesity with serious comorbidity in adult [E66.812, E66.01]  Yes    Stage 3a chronic kidney disease [N18.31]  Yes    JUSTIN (acute kidney injury) [N17.9]  Yes    Weakness [R53.1]  Yes    High blood pressure [I10]  Yes    Lower extremity edema [R60.0]  Yes    Elevated troponin [R79.89]  Yes    Obesity (BMI 30-39.9) [E66.9]  Yes      Resolved Hospital Problems   No resolved problems to display.        Brief Hospital Course to date:  Rika Millan is a 69 y.o. female presents to the hospital with severe hypothyroidism, myxedema, JUSTIN.    Discussion/plan for today: Thyroid dose adjusted and switched to oral thyroid today.  We will need to get case management to arrange primary care follow-up this patient currently has no primary care provider.  Patient reports symptoms of urge incontinence and will try low-dose oxybutynin to see if she has any benefit from this medication.  Patient instructed follow-up with outpatient urology and reports she has an appointment in April to see them.  Renal function continues to improve.  Continue monitoring.  Peripheral edema likely related to myxedema and should improve over time.  Add Eucerin lotion for the legs.  Plan to arrange for home health and likely home tomorrow if continued stability.      Hypothyroid/myxedema:  Patient initiated on Synthroid replacement.  Patient will need to follow-up closely with primary care provider.  Patient reports this is a new issue, suspect she has been hypothyroid for some time.    JUSTIN with CKD 3:  Nephrology assisting.  Encouraging oral intake.  Previous records reviewed and baseline creatinine 1.1 June 2024.  Monitor renal function and renally dose medications    Bilateral lower extremity edema: Due to myxedema.  Improved with diuresis.  Should continue to improve further  over time    Essential hypertension: Allow more permissive hypertension while recovering from JUSTIN.  Primary care follow-up for further blood pressure check and monitoring    Right renal artery stenosis and peripheral vascular disease: Patient previously had been evaluated by vascular surgery.  Aspirin and statin.    Hyperlipidemia: Discussed with patient she definitely needs statin at discharge.    Physical debility: PT.  Recent falls noted.  Encouraged patient family to provide support.    Elevated troponin: No sign of ACS.  Evaluated by cardiology.    Morbid obesity: Complicates all aspects of care.  Strongly recommend lifestyle changes.    DVT Prophylaxis:   Lovenox      Disposition: Home potentially 1 to 2 days pending clinical course    CODE STATUS:   Code Status and Medical Interventions: CPR (Attempt to Resuscitate); Full Support   Ordered at: 02/04/25 1801     Code Status (Patient has no pulse and is not breathing):    CPR (Attempt to Resuscitate)     Medical Interventions (Patient has pulse or is breathing):    Full Support       Blaine Leija MD  02/09/25

## 2025-02-09 NOTE — PROGRESS NOTES
[facility] Progress Note      Patient Name: Rika Millan  : 1955  MRN: [id number]  Primary Care Physician:  Provider, No Known  Date of admission: 2025    Subjective     Interval History:   F/u JUSTIN    Review of Systems:     Patient resting comfortably.  Denies any new complaint  Good urine output  Patient complaining of back pain    Objective     Vitals:   Temp:  [97.9 °F (36.6 °C)-98.4 °F (36.9 °C)] 98.1 °F (36.7 °C)  Resp:  [16-18] 16  BP: (137-153)/(67-93) 149/67    Intake/Output Summary (Last 24 hours) at 2025 0925  Last data filed at 2025 0100  Gross per 24 hour   Intake 840 ml   Output 1400 ml   Net -560 ml       Physical Exam:    General Appearance: alert, comfortable  Neck: supple, no JVD  Lungs: CTA bilat no rales  Heart: RRR, normal S1 and S2  Abdomen: soft, nontender, nondistended  : no palpable bladder  Extremities: 1+ BLE edema      Scheduled Meds:     aspirin, 81 mg, Oral, Daily  atorvastatin, 20 mg, Oral, Nightly  enoxaparin, 40 mg, Subcutaneous, Q24H  Eucerin original healing lotion, , Topical, Daily  levothyroxine, 50 mcg, Oral, Q AM  sodium chloride, 10 mL, Intravenous, Q12H      IV Meds:        Results Reviewed:   I have personally reviewed the results from the time of this admission to 2025 09:25 EST     Results from last 7 days   Lab Units 25  0658 25  0655 25  0459 25  0308 25  1218   SODIUM mmol/L 143 138 141   < > 141   POTASSIUM mmol/L 4.7 3.9 3.6   < > 4.6   CHLORIDE mmol/L 103 99 101   < > 103   CO2 mmol/L 29.0 27.3 30.0*   < > 26.7   BUN mg/dL 24* 26* 25*   < > 28*   CREATININE mg/dL 1.36* 1.51* 1.69*   < > 1.94*   CALCIUM mg/dL 9.2 9.0 8.8   < > 9.6   BILIRUBIN mg/dL  --   --   --   --  0.9   ALK PHOS U/L  --   --   --   --  81   ALT (SGPT) U/L  --   --   --   --  11   AST (SGOT) U/L  --   --   --   --  27   GLUCOSE mg/dL 112* 108* 96   < > 102*    < > = values in this interval not displayed.      Estimated Creatinine Clearance: 43.4 mL/min (A) (by C-G formula based on SCr of 1.36 mg/dL (H)).  Results from last 7 days   Lab Units 02/09/25  0658 02/08/25  0655 02/07/25  0459 02/06/25  0532 02/05/25  0308 02/04/25  1218   MAGNESIUM mg/dL  --   --   --  2.4 2.5* 2.4   PHOSPHORUS mg/dL 3.2 3.5 3.6 3.3 2.5 3.0     Results from last 7 days   Lab Units 02/07/25  0459 02/06/25  0532   URIC ACID mg/dL 6.6* 6.0*     Results from last 7 days   Lab Units 02/06/25  0532 02/05/25  0308 02/04/25  1218   WBC 10*3/mm3 5.95 6.34 5.12   HEMOGLOBIN g/dL 12.8 12.6 12.1   PLATELETS 10*3/mm3 192 196 205           Assessment / Plan     ASSESSMENT:  Non olig JUSTIN -improving slowly.  Creatinine 1.36 this morning from peak of 1.9.  Electrolytes okay.  Renal ultrasound okay  CKD stage 3A - BL Cr 1.1 as of June 2024, in assoc with renovascular disease (severe R renal artery stenosis)  Severe hypothyroidism/myxedema, TSH 90, T4 undetectable, started on levothyroxine, managed by primary team  BLE edema - due to #3.  Echo unremarkable.  Diuresed well with IV lasix, edema improving and has not assoc dyspnea - hold diuretic today  Severe R renal artery stenosis, evaluated by vascular surgery.  Agree to medical management   HTN, BP has improved; now 130s to 140s systolic w/o antihypertensive  PVD - CTA noted re: severe R popliteal stenosis, moderate L popliteal stenosis, severe stenosis of origin right renal artery   Weakness/mechanical falls likely from myxedema   Elevated troponin, evaluated by cardiology, no ischemic workup suggested    PLAN:  Keep off diuretics for now  Surveillance labs      Brent Giles MD  02/09/25  09:25 [facility]    [facility]  [phone]

## 2025-02-10 PROBLEM — I70.1 RENAL ARTERY STENOSIS: Status: ACTIVE | Noted: 2025-02-10

## 2025-02-10 PROBLEM — M79.89 LEG SWELLING: Status: ACTIVE | Noted: 2025-02-10

## 2025-02-10 LAB
ALBUMIN SERPL-MCNC: 3.5 G/DL (ref 3.5–5.2)
ANION GAP SERPL CALCULATED.3IONS-SCNC: 10.8 MMOL/L (ref 5–15)
BUN SERPL-MCNC: 25 MG/DL (ref 8–23)
BUN/CREAT SERPL: 18.4 (ref 7–25)
CALCIUM SPEC-SCNC: 9.1 MG/DL (ref 8.6–10.5)
CHLORIDE SERPL-SCNC: 101 MMOL/L (ref 98–107)
CO2 SERPL-SCNC: 26.2 MMOL/L (ref 22–29)
CREAT SERPL-MCNC: 1.36 MG/DL (ref 0.57–1)
EGFRCR SERPLBLD CKD-EPI 2021: 42 ML/MIN/1.73
GLUCOSE SERPL-MCNC: 107 MG/DL (ref 65–99)
PHOSPHATE SERPL-MCNC: 3.3 MG/DL (ref 2.5–4.5)
POTASSIUM SERPL-SCNC: 4 MMOL/L (ref 3.5–5.2)
SODIUM SERPL-SCNC: 138 MMOL/L (ref 136–145)

## 2025-02-10 PROCEDURE — 80069 RENAL FUNCTION PANEL: CPT | Performed by: INTERNAL MEDICINE

## 2025-02-10 PROCEDURE — 25010000002 ENOXAPARIN PER 10 MG: Performed by: INTERNAL MEDICINE

## 2025-02-10 RX ORDER — ACETAMINOPHEN 325 MG/1
650 TABLET ORAL EVERY 6 HOURS PRN
Start: 2025-02-10

## 2025-02-10 RX ORDER — POLYETHYLENE GLYCOL 3350 17 G/17G
17 POWDER, FOR SOLUTION ORAL 2 TIMES DAILY
Status: DISCONTINUED | OUTPATIENT
Start: 2025-02-10 | End: 2025-02-11 | Stop reason: HOSPADM

## 2025-02-10 RX ORDER — BISACODYL 10 MG
10 SUPPOSITORY, RECTAL RECTAL DAILY PRN
Status: DISCONTINUED | OUTPATIENT
Start: 2025-02-10 | End: 2025-02-11 | Stop reason: HOSPADM

## 2025-02-10 RX ORDER — EMOLLIENT COMBINATION NO.110
1 LOTION (ML) TOPICAL DAILY
Start: 2025-02-11

## 2025-02-10 RX ORDER — METOPROLOL SUCCINATE 25 MG/1
25 TABLET, EXTENDED RELEASE ORAL
Status: DISCONTINUED | OUTPATIENT
Start: 2025-02-10 | End: 2025-02-11 | Stop reason: HOSPADM

## 2025-02-10 RX ORDER — LEVOTHYROXINE SODIUM 75 UG/1
75 TABLET ORAL
Qty: 30 TABLET | Refills: 1 | Status: SHIPPED | OUTPATIENT
Start: 2025-02-11

## 2025-02-10 RX ORDER — METOPROLOL SUCCINATE 25 MG/1
25 TABLET, EXTENDED RELEASE ORAL
Qty: 30 TABLET | Refills: 1 | Status: SHIPPED | OUTPATIENT
Start: 2025-02-11

## 2025-02-10 RX ORDER — ATORVASTATIN CALCIUM 20 MG/1
20 TABLET, FILM COATED ORAL DAILY
Qty: 30 TABLET | Refills: 1 | Status: SHIPPED | OUTPATIENT
Start: 2025-02-10

## 2025-02-10 RX ORDER — BISACODYL 5 MG/1
5 TABLET, DELAYED RELEASE ORAL DAILY PRN
Status: DISCONTINUED | OUTPATIENT
Start: 2025-02-10 | End: 2025-02-11 | Stop reason: HOSPADM

## 2025-02-10 RX ORDER — AMOXICILLIN 250 MG
2 CAPSULE ORAL 2 TIMES DAILY
Status: DISCONTINUED | OUTPATIENT
Start: 2025-02-10 | End: 2025-02-11 | Stop reason: HOSPADM

## 2025-02-10 RX ORDER — OXYBUTYNIN CHLORIDE 5 MG/1
5 TABLET, EXTENDED RELEASE ORAL DAILY
Qty: 30 TABLET | Refills: 1 | Status: SHIPPED | OUTPATIENT
Start: 2025-02-11

## 2025-02-10 RX ORDER — ASPIRIN 81 MG/1
81 TABLET ORAL DAILY
Qty: 90 TABLET | Refills: 0 | Status: SHIPPED | OUTPATIENT
Start: 2025-02-11

## 2025-02-10 RX ADMIN — Medication: at 09:14

## 2025-02-10 RX ADMIN — ENOXAPARIN SODIUM 40 MG: 100 INJECTION SUBCUTANEOUS at 14:58

## 2025-02-10 RX ADMIN — ATORVASTATIN CALCIUM 20 MG: 20 TABLET, FILM COATED ORAL at 20:22

## 2025-02-10 RX ADMIN — ACETAMINOPHEN 650 MG: 325 TABLET ORAL at 09:12

## 2025-02-10 RX ADMIN — POLYETHYLENE GLYCOL 3350 17 G: 17 POWDER, FOR SOLUTION ORAL at 09:12

## 2025-02-10 RX ADMIN — SENNOSIDES AND DOCUSATE SODIUM 2 TABLET: 50; 8.6 TABLET ORAL at 20:21

## 2025-02-10 RX ADMIN — METOPROLOL SUCCINATE 25 MG: 25 TABLET, EXTENDED RELEASE ORAL at 09:12

## 2025-02-10 RX ADMIN — SENNOSIDES AND DOCUSATE SODIUM 2 TABLET: 50; 8.6 TABLET ORAL at 09:12

## 2025-02-10 RX ADMIN — Medication 10 ML: at 09:14

## 2025-02-10 RX ADMIN — POLYETHYLENE GLYCOL 3350 17 G: 17 POWDER, FOR SOLUTION ORAL at 20:22

## 2025-02-10 RX ADMIN — LEVOTHYROXINE SODIUM 50 MCG: 50 TABLET ORAL at 06:46

## 2025-02-10 RX ADMIN — OXYBUTYNIN CHLORIDE 5 MG: 5 TABLET, EXTENDED RELEASE ORAL at 09:12

## 2025-02-10 RX ADMIN — ASPIRIN 81 MG: 81 TABLET, COATED ORAL at 09:12

## 2025-02-10 NOTE — PROGRESS NOTES
[facility] Progress Note      Patient Name: Rika Millan  : 1955  MRN: [id number]  Primary Care Physician:  Provider, No Known  Date of admission: 2025    Subjective     Interval History:   F/u JUSTIN    Review of Systems:   Denies dyspnea or nausea     Objective     Vitals:   Temp:  [98.1 °F (36.7 °C)-98.6 °F (37 °C)] 98.4 °F (36.9 °C)  Heart Rate:  [73] 73  Resp:  [16-18] 16  BP: (134-164)/(72-77) 137/73    Intake/Output Summary (Last 24 hours) at 2/10/2025 0642  Last data filed at 2/10/2025 0501  Gross per 24 hour   Intake --   Output 200 ml   Net -200 ml       Physical Exam:    General Appearance: comfortable alert   Neck: supple, no JVD  Lungs: CTA bilat no rales  Heart: RRR, normal S1 and S2  Abdomen: soft, nontender, nondistended  Extremities: 1+ BLE edema    Scheduled Meds:     aspirin, 81 mg, Oral, Daily  atorvastatin, 20 mg, Oral, Nightly  enoxaparin, 40 mg, Subcutaneous, Q24H  Eucerin original healing lotion, , Topical, Daily  levothyroxine, 50 mcg, Oral, Q AM  oxybutynin XL, 5 mg, Oral, Daily  senna-docusate sodium, 2 tablet, Oral, BID   And  polyethylene glycol, 17 g, Oral, Daily  sodium chloride, 10 mL, Intravenous, Q12H      IV Meds:        Results Reviewed:   I have personally reviewed the results from the time of this admission to 2/10/2025 06:42 EST     Results from last 7 days   Lab Units 02/10/25  0403 25  0658 25  0655 25  0308 25  1218   SODIUM mmol/L 138 143 138   < > 141   POTASSIUM mmol/L 4.0 4.7 3.9   < > 4.6   CHLORIDE mmol/L 101 103 99   < > 103   CO2 mmol/L 26.2 29.0 27.3   < > 26.7   BUN mg/dL 25* 24* 26*   < > 28*   CREATININE mg/dL 1.36* 1.36* 1.51*   < > 1.94*   CALCIUM mg/dL 9.1 9.2 9.0   < > 9.6   BILIRUBIN mg/dL  --   --   --   --  0.9   ALK PHOS U/L  --   --   --   --  81   ALT (SGPT) U/L  --   --   --   --  11   AST (SGOT) U/L  --   --   --   --  27   GLUCOSE mg/dL 107* 112* 108*   < > 102*    < > = values in this  interval not displayed.     Estimated Creatinine Clearance: 43.4 mL/min (A) (by C-G formula based on SCr of 1.36 mg/dL (H)).  Results from last 7 days   Lab Units 02/10/25  0403 02/09/25  0658 02/08/25  0655 02/07/25  0459 02/06/25  0532 02/05/25  0308 02/04/25  1218   MAGNESIUM mg/dL  --   --   --   --  2.4 2.5* 2.4   PHOSPHORUS mg/dL 3.3 3.2 3.5   < > 3.3 2.5 3.0    < > = values in this interval not displayed.     Results from last 7 days   Lab Units 02/07/25  0459 02/06/25  0532   URIC ACID mg/dL 6.6* 6.0*     Results from last 7 days   Lab Units 02/06/25  0532 02/05/25  0308 02/04/25  1218   WBC 10*3/mm3 5.95 6.34 5.12   HEMOGLOBIN g/dL 12.8 12.6 12.1   PLATELETS 10*3/mm3 192 196 205           Assessment / Plan     ASSESSMENT:  Non olig JUSTIN - essentially resolved.  Cr 1.3 again today (peak 1.9).  Electrolytes okay.  Renal ultrasound okay  CKD stage 3A - BL Cr 1.1 as of June 2024, in assoc with renovascular disease (severe R renal artery stenosis)  Severe hypothyroidism/myxedema, TSH 90, T4 undetectable, started on levothyroxine, managed by primary team  BLE edema - due to #3.  Echo unremarkable.  Diuresed well with IV lasix, edema improving and has not assoc dyspnea - been off diuretic   Severe R renal artery stenosis, evaluated by vascular surgery.  Agree to medical management   HTN, BP generous, up to 160 systolic.  ACEi not ideal given severe atherosclerotic dz and FITZ.  CCB poor option due to edema.  HR 70s.  Add beta blocker  PVD - CTA noted re: severe R popliteal stenosis, moderate L popliteal stenosis, severe stenosis of origin right renal artery   Weakness/mechanical falls likely from myxedema   Elevated troponin, evaluated by cardiology, no ischemic workup suggested    PLAN:  Start low dose metop XL 25 mg daily   Ok with discharge today  Will arrange nephrology follow 2 weeks with Dr Bo Ellis MD  02/10/25  06:42 EST    [facility]  [phone]

## 2025-02-10 NOTE — PROGRESS NOTES
Continued Stay Note  [facility]     Patient Name: Rika Millan  MRN: [id number]  Today's Date: 2/10/2025    Admit Date: 2/4/2025    Plan: Return home with spouse, Colten BARGER, new patient appointment made.   Discharge Plan       Row Name 02/10/25 0927       Plan    Plan Return home with spouse, Colten BARGER, new patient appointment made.    Patient/Family in Agreement with Plan yes    Plan Comments Spoke with patient at bedside.  She confirms that she plans to return home at [place].  She states she uses an electric scooter and does not think she needs SNF.  She is agreeable to [facility] following.  She is agreeable for [facility] to make appointment with PCP.  Appointment with Bing KELLY on Friday 2/14 @ 1:45 p.m.  Colten BARGER will F/U after appointment and see if HH is needed/ordered.  Lexii HARRIS                         Expected Discharge Date and Time       Expected Discharge Date Expected Discharge Time    Feb 10, 2025               Becky S. Humeniuk, RN

## 2025-02-10 NOTE — DISCHARGE PLACEMENT REQUEST
"Cheikh Millan (69 y.o. Female)       Date of Birth   1955    Social Security Number       Address   [address] [address]    Home Phone   [phone]    MRN   [id number]       Anab[employer]ist   [employer]    Marital Status                               Admission Date   2/4/25    Admission Type   Emergency    Admitting Provider   Pamela Cordon MD    Attending Provider   Blaine Leija MD    Department, Room/Bed   [address], S607/1       Discharge Date       Discharge Disposition       Discharge Destination                                 Attending Provider: Blaine Leija MD    Allergies: Codeine, Morphine, Percocet [Oxycodone-acetaminophen]    Isolation: None   Infection: None   Code Status: CPR    Ht: 160 cm (62.99\")   Wt: 100 kg (220 lb 7.4 oz)    Admission Cmt: None   Principal Problem: Hypothyroidism [E03.9]                   Active Insurance as of 2/4/2025       Primary Coverage       Payor Plan Insurance Group Employer/Plan Group    MEDICARE MEDICARE A & B        Payor Plan Address Payor Plan Phone Number Payor Plan Fax Number Effective Dates    [address] [phone]  8/1/2019 - None Entered    [place] [address]         Subscriber Name Subscriber Birth Date Member ID       CHEIKH MILLAN 1955 [id number]                     Emergency Contacts        (Rel.) Home Phone Work Phone Mobile Phone    Adair Millan (Spouse) [phone] -- --                "

## 2025-02-10 NOTE — PLAN OF CARE
Goal Outcome Evaluation:      Patient A&Ox4. Encouraged to get up to the BSC with assistance. Incontinence care provided. Patient abdomen distended and taut this afternoon. No complaints of pain but very distended. Patient states she does not know the last time she had a bowel movement. PRN laxative given. Cardiac monitoring d/c. VSS.

## 2025-02-10 NOTE — PROGRESS NOTES
[facility] Medicine Services  PROGRESS NOTE    Patient Name: Rika Millan  : 1955  MRN: [id number]    Date of Admission: 2025  Primary Care Physician: Provider, No Known    Subjective   Subjective     CC:  Follow-up myxedema    Subjective:  Initially when patient evaluated early this morning patient felt ready to discharge.  However when she started ambulating around the room more and attempted to put on her pants she was extremely weak and feels unable to care for self at home.  She is requesting to go to SNF for subacute rehab.  Patient also having significant constipation.    Review of Systems  No current fevers or chills  No current shortness of breath or cough  No current nausea, vomiting, or diarrhea  No current chest pain or palpitations       Objective   Objective     Vital Signs:   Temp:  [97.9 °F (36.6 °C)-98.6 °F (37 °C)] 98.6 °F (37 °C)  Heart Rate:  [63-73] 63  Resp:  [16-18] 17  BP: (137-164)/(73-85) 151/85        Physical Exam:  Constitutional: Awake, alert, elderly appearing  HENT: NCAT, mucous membranes moist, neck supple  Respiratory: No cough or wheezes, normal respirations, nonlabored breathing   Cardiovascular: Pulse rate is normal, palpable radial pulses  Gastrointestinal:  Soft, nontender, nondistended  Musculoskeletal: Significantly obese, BMI is 40, debilitated appearance, some lower extremity edema is present  Psychiatric: Appropriate affect, cooperative, conversational  Neurologic: No slurred speech or facial droop, follows commands  Skin: No rashes or jaundice, warm      Results Reviewed:  Results from last 7 days   Lab Units 25  0532 25  0308 25  1218   WBC 10*3/mm3 5.95 6.34 5.12   HEMOGLOBIN g/dL 12.8 12.6 12.1   HEMATOCRIT % 38.4 37.4 38.6   PLATELETS 10*3/mm3 192 196 205     Results from last 7 days   Lab Units 02/10/25  0403 25  0658 25  0655 25  0308 25  1612 25  1218   SODIUM mmol/L 138 143 138   < >  --  141    POTASSIUM mmol/L 4.0 4.7 3.9   < >  --  4.6   CHLORIDE mmol/L 101 103 99   < >  --  103   CO2 mmol/L 26.2 29.0 27.3   < >  --  26.7   BUN mg/dL 25* 24* 26*   < >  --  28*   CREATININE mg/dL 1.36* 1.36* 1.51*   < >  --  1.94*   GLUCOSE mg/dL 107* 112* 108*   < >  --  102*   CALCIUM mg/dL 9.1 9.2 9.0   < >  --  9.6   ALK PHOS U/L  --   --   --   --   --  81   ALT (SGPT) U/L  --   --   --   --   --  11   AST (SGOT) U/L  --   --   --   --   --  27   HSTROP T ng/L  --   --   --   --  67* 77*   PROBNP pg/mL  --   --   --   --   --  261.0    < > = values in this interval not displayed.     Estimated Creatinine Clearance: 43.4 mL/min (A) (by C-G formula based on SCr of 1.36 mg/dL (H)).    Microbiology Results Abnormal       None            Imaging Results (Last 24 Hours)       ** No results found for the last 24 hours. **            Results for orders placed during the hospital encounter of 02/04/25    Adult Transthoracic Echo Complete W/ Cont if Necessary Per Protocol    Interpretation Summary    Left ventricular systolic function is low normal. Left ventricular ejection fraction appears to be 51 - 55%.    Left ventricular wall thickness is consistent with mild to moderate concentric hypertrophy.    Left ventricular diastolic function was normal.      I have reviewed the medications:  Scheduled Meds:aspirin, 81 mg, Oral, Daily  atorvastatin, 20 mg, Oral, Nightly  enoxaparin, 40 mg, Subcutaneous, Q24H  Eucerin original healing lotion, , Topical, Daily  levothyroxine, 50 mcg, Oral, Q AM  metoprolol succinate XL, 25 mg, Oral, Q24H  oxybutynin XL, 5 mg, Oral, Daily  senna-docusate sodium, 2 tablet, Oral, BID   And  polyethylene glycol, 17 g, Oral, Daily  sodium chloride, 10 mL, Intravenous, Q12H      Continuous Infusions:   PRN Meds:.  acetaminophen **OR** acetaminophen **OR** acetaminophen    senna-docusate sodium **AND** polyethylene glycol **AND** bisacodyl **AND** bisacodyl    melatonin    ondansetron ODT **OR**  ondansetron    sodium chloride    sodium chloride    Assessment & Plan   Assessment & Plan     Active Hospital Problems    Diagnosis  POA    **Hypothyroidism [E03.9]  Yes    Leg swelling [M79.89]  Yes    Renal artery stenosis [I70.1]  Yes    Urine incontinence [R32]  Yes    Class 2 severe obesity with serious comorbidity in adult [E66.812, E66.01]  Yes    Stage 3a chronic kidney disease [N18.31]  Yes    JUSTIN (acute kidney injury) [N17.9]  Yes    Weakness [R53.1]  Yes    Hypertension [I10]  Yes    Lower extremity edema [R60.0]  Yes    Elevated troponin [R79.89]  Yes    Obesity (BMI 30-39.9) [E66.9]  Yes      Resolved Hospital Problems   No resolved problems to display.        Brief Hospital Course to date:  Rika Millan is a 69 y.o. female presents to the hospital with severe hypothyroidism, myxedema, JUSTIN.    Discussion/plan for today: Case discussed with nephrology over the phone.  They would like to continue her on beta-blocker at discharge for blood pressure control.  She is not a good candidate for ACE or ARB due to renal artery stenosis.  Oxybutynin is helping with overactive bladder and urine incontinence.  PT OT consultation.  Patient now feels she needs to go to SNF.  Case management consulted for assessment for SNF.  Peripheral edema continues to improve.  Eucerin to legs.  Chronic kidney disease is reasonably stable today.  Case discussed on multidiscipline rounds with case management, nursing, and pharmacist.    Hypothyroid/myxedema:  Patient initiated on Synthroid replacement.  Patient will need to follow-up closely with primary care provider.  Patient reports this is a new issue, suspect she has been hypothyroid for some time.  She will need recheck of thyroid test at primary care in 4 to 6 weeks after discharge to further adjust thyroid medications.    JUSTIN with CKD 3:  Nephrology assisting.  Encouraging oral intake.  Previous records reviewed and baseline creatinine 1.1 June 2024.  Renal function has  improved and stabilized it appears she may be developing a new baseline with creatinine 1.3.    Bilateral lower extremity edema: Due to myxedema.  Improved with diuresis.  Should continue to improve further over time with treatment of thyroid.    Essential hypertension: Allow more permissive hypertension while recovering from JUSTIN.  Primary care follow-up for further blood pressure check and monitoring.  Beta-blocker started per nephrology.  Avoid ACE or ARB due to renal artery stenosis.    Right renal artery stenosis and peripheral vascular disease: Patient previously had been evaluated by vascular surgery.  ABIs consistent with some mild PAD.  Vascular surgery evaluated and recommended medical management.  Aspirin and statin.    Hyperlipidemia: Discussed with patient she definitely needs statin at discharge.    Physical debility: PT.  Recent falls noted.  Encouraged patient family to provide support.    Elevated troponin: No sign of ACS.  Evaluated by cardiology.    Morbid obesity: Complicates all aspects of care.  Strongly recommend lifestyle changes.    DVT Prophylaxis:   Lovenox      Disposition: Home potentially 1 to 2 days pending clinical course    CODE STATUS:   Code Status and Medical Interventions: CPR (Attempt to Resuscitate); Full Support   Ordered at: 02/04/25 1801     Code Status (Patient has no pulse and is not breathing):    CPR (Attempt to Resuscitate)     Medical Interventions (Patient has pulse or is breathing):    Full Support       Blaine Leija MD  02/10/25

## 2025-02-10 NOTE — PLAN OF CARE
Problem: Adult Inpatient Plan of Care  Goal: Plan of Care Review  Outcome: Progressing  Flowsheets (Taken 2/10/2025 0737)  Progress: improving  Outcome Evaluation: Patient is alert oriented x 4, no respiratory distress nor discomfort noted the whole shift. Fall risk prevention protocol maintained. Discharge still pending, patient will decide today if she wants to go in SNF or home with home health.  Plan of Care Reviewed With: patient   Goal Outcome Evaluation:  Plan of Care Reviewed With: patient        Progress: improving  Outcome Evaluation: Patient is alert oriented x 4, no respiratory distress nor discomfort noted the whole shift. Fall risk prevention protocol maintained. Discharge still pending, patient will decide today if she wants to go in SNF or home with home health.

## 2025-02-10 NOTE — PROGRESS NOTES
Continued Stay Note  [facility]     Patient Name: Rika Millan  MRN: [id number]  Today's Date: 2/10/2025    Admit Date: 2/4/2025    Plan: [facility] SNF - bed available 2/11   Discharge Plan       Row Name 02/10/25 1517       Plan    Plan [facility] - bed available 2/11    Patient/Family in Agreement with Plan yes    Plan Comments Patient unable to stand with spouse and unable to get dressed for DC.   is aware she will need SNF.  Patient is agreeable.  They would like referral to [facility] - no beds, and to [facility] - no beds, [place] - no beds, but bed is available at [facility] tomorrow - they are agreeable.  CCP following.  Lexii HARRIS                    Expected Discharge Date and Time       Expected Discharge Date Expected Discharge Time    Feb 11, 2025               Becky S. Humeniuk, RN

## 2025-02-11 VITALS
TEMPERATURE: 97.9 F | BODY MASS INDEX: 39.06 KG/M2 | HEART RATE: 63 BPM | RESPIRATION RATE: 18 BRPM | SYSTOLIC BLOOD PRESSURE: 147 MMHG | WEIGHT: 220.46 LBS | DIASTOLIC BLOOD PRESSURE: 83 MMHG | HEIGHT: 63 IN | OXYGEN SATURATION: 96 %

## 2025-02-11 PROBLEM — I73.9 PERIPHERAL ARTERIAL DISEASE: Status: ACTIVE | Noted: 2025-02-11

## 2025-02-11 LAB
ALBUMIN SERPL ELPH-MCNC: 3.7 G/DL (ref 2.9–4.4)
ALBUMIN/GLOB SERPL: 1.2 {RATIO} (ref 0.7–1.7)
ALPHA1 GLOB SERPL ELPH-MCNC: 0.2 G/DL (ref 0–0.4)
ALPHA2 GLOB SERPL ELPH-MCNC: 0.8 G/DL (ref 0.4–1)
B-GLOBULIN SERPL ELPH-MCNC: 0.9 G/DL (ref 0.7–1.3)
GAMMA GLOB SERPL ELPH-MCNC: 1.2 G/DL (ref 0.4–1.8)
GLOBULIN SER-MCNC: 3.1 G/DL (ref 2.2–3.9)
IGA SERPL-MCNC: 164 MG/DL (ref 87–352)
IGG SERPL-MCNC: 1395 MG/DL (ref 586–1602)
IGM SERPL-MCNC: 148 MG/DL (ref 26–217)
INTERPRETATION SERPL IEP-IMP: NORMAL
LABORATORY COMMENT REPORT: NORMAL
M PROTEIN SERPL ELPH-MCNC: NORMAL G/DL
PROT SERPL-MCNC: 6.8 G/DL (ref 6–8.5)

## 2025-02-11 PROCEDURE — 97530 THERAPEUTIC ACTIVITIES: CPT

## 2025-02-11 PROCEDURE — 97110 THERAPEUTIC EXERCISES: CPT

## 2025-02-11 RX ORDER — BISACODYL 5 MG/1
5 TABLET, DELAYED RELEASE ORAL DAILY PRN
Start: 2025-02-11

## 2025-02-11 RX ORDER — SODIUM PHOSPHATE, DIBASIC AND SODIUM PHOSPHATE, MONOBASIC 3.5; 9.5 G/66ML; G/66ML
1 ENEMA RECTAL ONCE
Status: DISCONTINUED | OUTPATIENT
Start: 2025-02-11 | End: 2025-02-11

## 2025-02-11 RX ORDER — BISACODYL 10 MG
10 SUPPOSITORY, RECTAL RECTAL DAILY PRN
Start: 2025-02-11

## 2025-02-11 RX ORDER — POLYETHYLENE GLYCOL 3350 17 G/17G
17 POWDER, FOR SOLUTION ORAL 2 TIMES DAILY PRN
Start: 2025-02-11

## 2025-02-11 RX ORDER — AMOXICILLIN 250 MG
2 CAPSULE ORAL 2 TIMES DAILY
Start: 2025-02-11

## 2025-02-11 RX ADMIN — Medication: at 10:10

## 2025-02-11 RX ADMIN — LEVOTHYROXINE SODIUM 50 MCG: 50 TABLET ORAL at 05:40

## 2025-02-11 RX ADMIN — POLYETHYLENE GLYCOL 3350 17 G: 17 POWDER, FOR SOLUTION ORAL at 10:09

## 2025-02-11 RX ADMIN — BISACODYL 5 MG: 5 TABLET, COATED ORAL at 05:40

## 2025-02-11 RX ADMIN — SENNOSIDES AND DOCUSATE SODIUM 2 TABLET: 50; 8.6 TABLET ORAL at 10:10

## 2025-02-11 RX ADMIN — METOPROLOL SUCCINATE 25 MG: 25 TABLET, EXTENDED RELEASE ORAL at 10:10

## 2025-02-11 RX ADMIN — ASPIRIN 81 MG: 81 TABLET, COATED ORAL at 10:10

## 2025-02-11 RX ADMIN — OXYBUTYNIN CHLORIDE 5 MG: 5 TABLET, EXTENDED RELEASE ORAL at 10:09

## 2025-02-11 NOTE — PROGRESS NOTES
Discharge Planning Assessment  [facility]     Patient Name: Rika Millan  MRN: [id number]  Today's Date: 2/11/2025    Admit Date: 2/4/2025    Plan: [facility] SNF via [facility]       Discharge Plan       Row Name 02/11/25 1024       Plan    Plan [facility] SNF via [facility]    Patient/Family in Agreement with Plan yes    Plan Comments DC orders in EPIC.  Per [facility] has skilled bed available.  [facility] scheduled for 2:00 p.m. New patient appointment for PCP rescheduled for 3/3 and card given to .  Packet to STEVEN.  Lexii HARRIS                  Continued Care and Services - Admitted Since 2/4/2025       Destination Coordination complete.      Service Provider Request Status Services Address Phone Fax Patient Preferred    [facility] [address] [id number] [phone] --    [facility] -- [address] [address] [phone] --    [facility] Pending - Request Sent -- [address] [facility] [address] [id number] [phone] --              Home Medical Care       Service Provider Request Status Services Address Phone Fax Patient Preferred    [facility]  Selected Home Medical , Home Living Aide Services, Home Nursing, Home Rehabilitation, Home Health Services [address] [address] [phone] --                  Expected Discharge Date and Time       Expected Discharge Date Expected Discharge Time    Feb 11, 2025                                 Becky S. Humeniuk, RN

## 2025-02-11 NOTE — DISCHARGE SUMMARY
[facility] Medicine Services  DISCHARGE SUMMARY    Patient Name: Rika Millan  : 1955  MRN: [id number]    Date of Admission: 2025  1:21 PM  Date of Discharge: 2024      Consults       Date and Time Order Name Status Description    2025  1:41 PM Inpatient Nephrology Consult Completed     2025 12:20 PM Inpatient Vascular Surgery Consult Completed     2025  9:01 PM Inpatient Cardiology Consult Completed     2025  8:46 PM Inpatient Vascular Surgery Consult Completed     2025  4:08 PM [facility] (on-call MD unless specified) Details              Hospital Course       Active Hospital Problems    Diagnosis  POA    **Hypothyroidism [E03.9]  Yes    Peripheral arterial disease [I73.9]  Yes    Leg swelling [M79.89]  Yes    Renal artery stenosis [I70.1]  Yes    Urine incontinence [R32]  Yes    Class 2 severe obesity with serious comorbidity in adult [E66.812, E66.01]  Yes    Stage 3a chronic kidney disease [N18.31]  Yes    JUSTIN (acute kidney injury) [N17.9]  Yes    Weakness [R53.1]  Yes    Hypertension [I10]  Yes    Lower extremity edema [R60.0]  Yes    Elevated troponin [R79.89]  Yes    Obesity (BMI 30-39.9) [E66.9]  Yes      Resolved Hospital Problems   No resolved problems to display.          Hospital Course:  Rika Millan is a 69 y.o. female presents to the hospital with severe hypothyroidism, myxedema, JUSTIN.  Patient was treated for profound hypothyroid and has shown clinical improvement.  She will discharge on oral Synthroid.  I have instructed her to get a pill organizer and carefully take this daily.  Recommend recheck lab test with primary care in about 4 to 6 weeks with TSH and free T4.  It may take a while for the TSH to normalize but hopefully the free T4 will be close to normal in 4 to 6 weeks.  Patient was managed with the assistance of nephrology for acute kidney injury and is felt to have underlying chronic kidney disease.  Her renal function is now stable.  Patient had  Relevant Problems   No relevant active problems       Anesthetic History   No history of anesthetic complications            Review of Systems / Medical History  Patient summary reviewed, nursing notes reviewed and pertinent labs reviewed    Pulmonary  Within defined limits                 Neuro/Psych             Comments: ble weakness Cardiovascular    Hypertension                   GI/Hepatic/Renal     GERD    Renal disease: stones       Endo/Other    Diabetes    Arthritis (RHEUMATOID ARTHRITIS)     Other Findings   Comments: Numbness and tingling of both lower extremities (R20.0, R20.2)    Weakness of both legs (R29.898)    Imbalance (R26.89)    Kidney stone (N20.0)    Hypertension (I10)    GERD (gastroesophageal reflux disease) (K21.9)    Diabetes (HCC) (E11.9)  5.7 A1C  Back pain (M54.9)    Rheumatoid arthritis (HCC) (M06.9)             Physical Exam    Airway  Mallampati: IV  TM Distance: < 4 cm  Neck ROM: short neck   Mouth opening: Normal     Cardiovascular    Rhythm: regular  Rate: normal         Dental    Dentition: Full upper dentures     Pulmonary  Breath sounds clear to auscultation               Abdominal  Abdominal exam normal       Other Findings            Anesthetic Plan    ASA: 3  Anesthesia type: general    Monitoring Plan: Arterial line      Induction: Intravenous  Anesthetic plan and risks discussed with: Patient bilateral lower extremity edema and did receive some diuresis.  This is mostly felt to be due to her hypothyroid myxedema and should continue to gradually improve.  Leg elevation when resting recommended but also encouraged to mobilize.  Cholesterol profoundly elevated.  Please see below.  Statin at discharge.  Needs aspirin and statin for renal artery stenosis and peripheral vascular disease we are planning to medically manage.  Treatment plan discussed with patient is in agreement.  At the time of discharge patient was told to take all medications as prescribed, keep all follow-up appointments, and call their doctor or return to the hospital with any worsening or concerning symptoms.    Please note that this note was made using Dragon voice recognition software            Hypothyroid/myxedema:  Patient initiated on Synthroid replacement.  Patient will need to follow-up closely with primary care provider.  Patient reports this is a new issue, suspect she has been hypothyroid for some time.  She will need recheck of thyroid test at primary care in 4 to 6 weeks after discharge to further adjust thyroid medications.     JUSTIN with CKD 3:  Nephrology assisting.  Encouraging oral intake.  Previous records reviewed and baseline creatinine 1.1 June 2024.  Renal function has improved and stabilized it appears she may be developing a new baseline with creatinine 1.3.     Bilateral lower extremity edema: Due to myxedema.  Improved with diuresis.  Should continue to improve further over time with treatment of thyroid.     Essential hypertension: Allow more permissive hypertension while recovering from JUSTIN.  Primary care follow-up for further blood pressure check and monitoring.  Beta-blocker started per nephrology.  Avoid ACE or ARB due to renal artery stenosis.     Right renal artery stenosis and peripheral vascular disease: Patient previously had been evaluated by vascular surgery.  ABIs consistent with some mild PAD.   Vascular surgery evaluated and recommended medical management and does not feel patient needs surgical intervention.  Aspirin and statin.     Hyperlipidemia: Discussed with patient she definitely needs statin at discharge.     Physical debility: PT.  Recent falls noted.  Discharge to SNF for subacute rehab.  Hopefully she will get stronger.  Encouraged patient family to provide support.     Elevated troponin: No sign of ACS.  Evaluated by cardiology.  No current issue     Morbid obesity: Complicates all aspects of care.  Strongly recommend lifestyle changes.           Day of Discharge     Subjective: Patient continues to feel little bit better every day.  She is still weak and wants to try to condition herself more at a skilled nursing facility for subacute rehabilitation.  She denies any new complaints aside from some constipation and wanted to try an enema today.  She agrees to stick to a regular bowel regimen at discharge.  She notes she chronically struggles with some constipation.  She is denying any abdominal pain or nausea.    No current fevers or chills  No current shortness of breath or cough  No current nausea, vomiting, or diarrhea  No current chest pain or palpitations      Vital Signs:   Temp:  [97.7 °F (36.5 °C)-98.6 °F (37 °C)] 97.7 °F (36.5 °C)  Heart Rate:  [63] 63  Resp:  [16-17] 16  BP: (134-151)/(72-85) 134/72     Physical Exam:    Constitutional: Awake, alert, elderly appearing  HENT: NCAT, mucous membranes moist   Respiratory: No cough or wheezes, normal respirations, nonlabored breathing   Cardiovascular: Pulse rate is normal   Gastrointestinal:  Soft, nontender, nondistended  Musculoskeletal: Significantly obese, BMI is 40, debilitated appearance, some lower extremity edema is present  Psychiatric: Appropriate affect, cooperative, conversational  Neurologic: No slurred speech or facial droop, follows commands  Skin: No rashes or jaundice, warm       Pertinent  and/or Most Recent Results      Results from last 7 days   Lab Units 02/10/25  0403 02/09/25  0658 02/08/25  0655 02/07/25  0459 02/06/25  0532 02/05/25  0308 02/04/25  1218   WBC 10*3/mm3  --   --   --   --  5.95 6.34 5.12   HEMOGLOBIN g/dL  --   --   --   --  12.8 12.6 12.1   HEMATOCRIT %  --   --   --   --  38.4 37.4 38.6   PLATELETS 10*3/mm3  --   --   --   --  192 196 205   SODIUM mmol/L 138 143 138 141 140 140 141   POTASSIUM mmol/L 4.0 4.7 3.9 3.6 4.3 4.4 4.6   CHLORIDE mmol/L 101 103 99 101 103 105 103   CO2 mmol/L 26.2 29.0 27.3 30.0* 27.3 27.2 26.7   BUN mg/dL 25* 24* 26* 25* 22 23 28*   CREATININE mg/dL 1.36* 1.36* 1.51* 1.69* 1.86* 1.78* 1.94*   GLUCOSE mg/dL 107* 112* 108* 96 96 122* 102*   CALCIUM mg/dL 9.1 9.2 9.0 8.8 9.2 8.9 9.6     Results from last 7 days   Lab Units 02/04/25  1218   BILIRUBIN mg/dL 0.9   ALK PHOS U/L 81   ALT (SGPT) U/L 11   AST (SGOT) U/L 27     Results from last 7 days   Lab Units 02/06/25  0532   CHOLESTEROL mg/dL 458*   TRIGLYCERIDES mg/dL 152*   HDL CHOL mg/dL 80*     Results from last 7 days   Lab Units 02/08/25  0655 02/07/25  0459 02/05/25  0308 02/04/25  1612 02/04/25  1218   TSH uIU/mL 77.000* 81.100*  --   --  92.600*   CORTISOL mcg/dL  --   --  10.50  --   --    PROBNP pg/mL  --   --   --   --  261.0   HSTROP T ng/L  --   --   --  67* 77*       Imaging Results (All)       Procedure Component Value Units Date/Time    US Renal Bilateral [[id number]] Collected: 02/07/25 1747     Updated: 02/07/25 3217    Narrative:      RENAL ULTRASOUND     HISTORY: JUSTIN on CKD     COMPARISON: None     TECHNIQUE: Grayscale, color Doppler images of the kidneys and bladder  were obtained.     FINDINGS:  Grayscale and color Doppler images of the kidneys and bladder were  obtained.     The right kidney measures 8.1 cm in length.     The left kidney measures 9 cm in length.     The kidneys demonstrate normal echogenicity and cortical thickness.  There is no hydronephrosis. There are no sonographically visualized  solid  contour deforming renal masses or renal calculi.     The bladder is decompressed and cannot be evaluated..     Visualized portions of the aorta and IVC are normal in caliber and  appearance.       Impression:      1.  No hydronephrosis. Please note evaluation is suboptimal due to  patient body habitus..  2.  Other findings above           This report was finalized on 2/7/2025 5:51 PM by Dr. Bernardo Flores M.D  on Workstation: [id number]       CT Head Without Contrast [[id number]] Collected: 02/04/25 1544     Updated: 02/05/25 0623    Narrative:      CT SCAN OF THE HEAD WITHOUT CONTRAST 02/04/2025     CLINICAL HISTORY: This is a 69-year-old female patient who presents to  the emergency room with generalized weakness and multiple falls, and has  had worsening urinary incontinence.     TECHNIQUE: Spiral CT images were obtained from the base of the skull to  the vertex without intravenous contrast. The images were reformatted and  are submitted in 3 mm thick axial, sagittal and coronal CT sections with  brain algorithm.     There are no prior head CTs or MRIs of the brain from [facility] for comparison.     FINDINGS: There are extensive patchy and confluent areas of low density  extending from the periventricular throughout the subcortical white  matter of the cerebral hemispheres that is most consistent with  moderate-to-severe small vessel disease. The remainder of the brain  parenchyma is normal in attenuation. There is diffuse cerebral atrophy.  The lateral and third ventricles are mildly prominent in size and this  is probably on the basis central volume loss or atrophy. I see no focal  mass effect. There is no midline shift. No extra-axial fluid collections  are identified and there is no evidence of acute intracranial  hemorrhage. The calvarium and the skull base are normal in appearance.  The paranasal sinuses and the mastoid air cells and the middle ear  cavities are clear. There are lens  implants in the globes bilaterally  from previous bilateral cataract surgery. Otherwise, the orbits are  unremarkable..       Impression:      1. No convincing acute intracranial abnormality is identified.     2. There is extensive confluent low-density throughout the cerebral  white matter that is most probably a manifestation of severe small  vessel disease. There is diffuse cerebral atrophy. The lateral and third  ventricles are prominent in size and this is most probably on the basis  of central volume loss or atrophy rather than NPH and correlate  clinically. There are lens implants in the globes from previous  bilateral cataract surgery. The remainder of the head CT is normal.      Radiation dose reduction techniques were utilized, including automated  exposure control and exposure modulation based on body size.        This report was finalized on 2/5/2025 6:20 AM by Dr. Osmani Alfred M.D on  Workstation: [id number]       CT Angio Abdominal Aorta Bilateral Iliofem Runoff [[id number]] Collected: 02/04/25 1514     Updated: 02/04/25 1531    Narrative:      CTA ABDOMEN, PELVIS, BILATERAL LOWER EXTREMITY RUNOFF     HISTORY: Cold left foot, weakly dopplerable lower extremity pulses     TECHNIQUE: Radiation dose reduction techniques were utilized, including  automated exposure control and exposure modulation based on body size.  CT angiogram of the abdomen/pelvis and bilateral lower extremity runoff  was performed. Multiple coronal, sagittal, and 3-D reconstruction images  were obtained.     COMPARISON: None available     FINDINGS:  AORTOILIAC:  There is no abdominal aortic aneurysm. There is no stenosis of the  abdominal aorta. There is some minimal atherosclerotic plaque in the  abdominal aorta. The common iliac and external iliac arteries are widely  patent without evidence of aneurysm. There is some mild stenosis of the  right internal iliac artery due to atherosclerotic plaque. The celiac  artery and SMA are  patent. There is severe stenosis at the origin of the  right renal artery and a mild stenosis at the origin of the left renal  artery. The inferior mesenteric artery is patent.     RIGHT LOWER EXTREMITY:  The common femoral artery is widely patent. Areas of mild stenosis of  the distal superficial femoral artery. There are 2 separate severe  stenoses involving the popliteal artery above the knee. The dominant  runoff is supplied by the posterior tibial artery. There are areas of  stenosis or occlusion involving the anterior tibial artery. The peroneal  artery appears patent. There is edema present throughout the right lower  extremity, greatest below the knee. Postoperative changes of the distal  fibula and tibia     LEFT LOWER EXTREMITY:  The common femoral artery is patent. There are areas of mild stenosis in  the distal superficial femoral artery. There is moderate stenosis of the  mid popliteal artery. The posterior tibial artery is largely occluded.  The dominant runoff to the foot is supplied by the peroneal artery. The  anterior tibial artery is diffusely stenotic and appears occluded  distally. Left lower extremity edema, greatest below the knee.     ABDOMEN/PELVIS:  Areas of atelectasis or scarring in the lung bases. The liver is  unremarkable. Cholecystectomy. Spleen is unremarkable. There is some  decreased enhancement of the right kidney with respect to the left  likely due to the severe renal artery stenosis. The adrenal glands are  unremarkable. Pancreas is unremarkable. There is some nonspecific  stranding in the lower left retroperitoneum. The bladder appears  unremarkable. There is small amount of edema and fluid in the deep  pelvis. Scattered diverticula within the colon without diverticulitis.  Moderate stool. Lumbar spine degenerative changes       Impression:      1. There is no significant aortoiliac inflow stenosis  2. There is severe stenoses within the right popliteal artery.  Dominant  runoff on the right is supplied by the posterior tibial artery  3. Moderate stenosis of the mid left popliteal artery. Dominant runoff  on the left supplied by the peroneal artery  4. Severe stenosis at the origin of the right renal artery  5. Additional findings as described           Radiation dose reduction techniques were utilized, including automated  exposure control and exposure modulation based on body size.        This report was finalized on 2/4/2025 3:28 PM by Dr. Nate Gamble M.D  on Workstation: [id number]       XR Chest 1 View [[id number]] Collected: 02/04/25 1422     Updated: 02/04/25 1426    Narrative:      XR CHEST 1 VW-2/4/2025     HISTORY: Weakness.     Heart size is mildly enlarged. Lungs appear clear. Bones and soft  tissues are otherwise unremarkable.       Impression:      1. Mild cardiomegaly.        This report was finalized on 2/4/2025 2:23 PM by Dr. Ector Monahan M.D on Workstation: [id number]               Results for orders placed during the hospital encounter of 02/04/25    Duplex Carotid Ultrasound CAR    Interpretation Summary    Right internal carotid artery demonstrates a less than 50% stenosis.    Antegrade right vertebral flow.    Left internal carotid artery demonstrates a less than 50% stenosis.    Antegrade left vertebral flow.      Results for orders placed during the hospital encounter of 02/04/25    Duplex Carotid Ultrasound CAR    Interpretation Summary    Right internal carotid artery demonstrates a less than 50% stenosis.    Antegrade right vertebral flow.    Left internal carotid artery demonstrates a less than 50% stenosis.    Antegrade left vertebral flow.      Results for orders placed during the hospital encounter of 02/04/25    Adult Transthoracic Echo Complete W/ Cont if Necessary Per Protocol    Interpretation Summary    Left ventricular systolic function is low normal. Left ventricular ejection fraction appears to be 51 - 55%.    Left  ventricular wall thickness is consistent with mild to moderate concentric hypertrophy.    Left ventricular diastolic function was normal.      Discharge Details        Discharge Medications        New Medications        Instructions Start Date   acetaminophen 325 MG tablet  Commonly known as: TYLENOL   650 mg, Oral, Every 6 Hours PRN      Aspirin Low Dose 81 MG EC tablet  Generic drug: aspirin   81 mg, Oral, Daily      atorvastatin 20 MG tablet  Commonly known as: LIPITOR   20 mg, Oral, Daily      bisacodyl 5 MG EC tablet  Commonly known as: DULCOLAX   5 mg, Oral, Daily PRN      bisacodyl 10 MG suppository  Commonly known as: DULCOLAX   10 mg, Rectal, Daily PRN      Eucerin original healing lotion lotion   1 each, Topical, Daily, Apply to both legs daily, available over-the-counter      levothyroxine 75 MCG tablet  Commonly known as: SYNTHROID, LEVOTHROID   75 mcg, Oral, Every Early Morning      metoprolol succinate XL 25 MG 24 hr tablet  Commonly known as: TOPROL-XL   25 mg, Oral, Every 24 Hours Scheduled      oxybutynin XL 5 MG 24 hr tablet  Commonly known as: DITROPAN-XL   5 mg, Oral, Daily      polyethylene glycol 17 g packet  Commonly known as: MIRALAX   17 g, Oral, 2 Times Daily PRN      sennosides-docusate 8.6-50 MG per tablet  Commonly known as: PERICOLACE   2 tablets, Oral, 2 Times Daily               Allergies   Allergen Reactions    Codeine Nausea Only    Morphine Nausea And Vomiting    Percocet [Oxycodone-Acetaminophen] Nausea And Vomiting         Discharge Disposition:  Skilled Nursing Facility (DC - External)    Diet:  Hospital:  Diet Order   Procedures    Diet: Regular/House; Fluid Consistency: Thin (IDDSI 0)       Activity:  Activity Instructions       Activity as Tolerated                   CODE STATUS:    Code Status and Medical Interventions: CPR (Attempt to Resuscitate); Full Support   Ordered at: 02/04/25 9031     Code Status (Patient has no pulse and is not breathing):    CPR (Attempt to  Resuscitate)     Medical Interventions (Patient has pulse or is breathing):    Full Support       Future Appointments   Date Time Provider Department Center   2/14/2025  1:45 PM Bign Saavedra APRN [facility]   8/13/2025  8:00 AM [facility] RM 2 [facility]   8/13/2025  9:00 AM Margret Arriola MD [facility]             Additional Instructions for the Follow-ups that You Need to Schedule       Discharge Follow-up with Specified Provider: Follow-up with primary care provider as instructed.  Recommend to recheck thyroid testing in approximately 4 to 6 weeks.  Case management is arranging follow-up   As directed      To: Follow-up with primary care provider as instructed.  Recommend to recheck thyroid testing in approximately 4 to 6 weeks.  Case management is arranging follow-up               Contact information for follow-up providers       Provider, No Known. Schedule an appointment as soon as possible for a visit.    Why: Recommend to recheck thyroid testing in approximately 4 to 6 weeks.  Contact information:  [facility] [address]               Abilio Soriano MD. Schedule an appointment as soon as possible for a visit in 2 week(s).    Specialty: Nephrology  Why: Nephrology follow-up for chronic kidney disease and renal artery stenosis.  Please call to schedule appointment or with  Contact information:  [address]  [facility] [address]  [facility] [address]  [phone]               Margret Arriola MD Follow up in 6 month(s).    Specialty: Vascular Surgery  Contact information:  [address]  [facility] [address]  [facility] [address]  [phone]                       Contact information for after-discharge care       [facility] .    Service: Skilled Nursing  Contact information:  [facility] [facility] [id number] [phone]                     Home Medical Care       [facility]  .    Services: Home Medical , Home Living Aide Services, Home Nursing, Home Rehabilitation, Home Health Services  Contact information:  [address] [address] [place] [address] [phone]                                       Blaine Leija MD  02/11/25      Time Spent on Discharge:  I spent greater than 30 minutes on this discharge activity which included: face-to-face encounter with the patient, reviewing the data in the system, coordination of the care with the nursing staff as well as consultants, documentation, and entering orders.

## 2025-02-11 NOTE — PLAN OF CARE
Goal Outcome Evaluation:              Outcome Evaluation: Declined activity tolerance and independence w/ mobility during PT today.  Able to sit up w/ min A, increased time and verbal cues for sequencing.  Able to stand twice w/ min A using RW.  Took steps at EOB w/ min A using RW, limited by fatigue/weakness.  Recommend sitting in chair for meals, using BSC instead of purewick and DC to SNU.    Anticipated Discharge Disposition (PT): skilled nursing facility

## 2025-02-11 NOTE — PROGRESS NOTES
Case Management Discharge Note      Final Note: DC'd to skilled bed at [facility] via [facility]         Selected Continued Care - Discharged on 2/11/2025 Admission date: 2/4/2025 - Discharge disposition: Skilled Nursing Facility (DC - External)      Destination Coordination complete.      Service Provider Services Address Phone Fax Patient Preferred    [facility] [address] [id number] [phone] --                  Home Medical Care       Service Provider Services Address Phone Fax Patient Preferred    [facility] - [facility], [facility] , Home Living Aide Services, Home Nursing, Home Rehabilitation, Home Health Services [address] [facility]  [place] [id number] [phone] --                      Transportation Services  Ambulance: [facility]    Final Discharge Disposition Code: 03 - skilled nursing facility (SNF)

## 2025-02-11 NOTE — PLAN OF CARE
Problem: Adult Inpatient Plan of Care  Goal: Plan of Care Review  Outcome: Progressing  Flowsheets (Taken 2/11/2025 1897)  Progress: improving  Outcome Evaluation: Patient is alert oriented x 4 and room air. For possible discharge today to [facility] but no BM since 2 weeks, no complains of abdominal pain and discomfort.  Plan of Care Reviewed With: patient   Goal Outcome Evaluation:  Plan of Care Reviewed With: patient        Progress: improving  Outcome Evaluation: Patient is alert oriented x 4 and room air. For possible discharge today to [facility] but no BM since 2 weeks, no complains of abdominal pain and discomfort.

## 2025-02-11 NOTE — THERAPY TREATMENT NOTE
Patient Name: Rika Millan  : 1955    MRN: [id number]                              Today's Date: 2025       Admit Date: 2025    Visit Dx:     ICD-10-CM ICD-9-CM   1. JUSTIN (acute kidney injury)  N17.9 584.9   2. Leg swelling  M79.89 729.81   3. Hypertension, unspecified type  I10 401.9   4. Hypothyroidism, unspecified type  E03.9 244.9   5. Peripheral arterial disease  I73.9 443.9   6. Renal artery stenosis  I70.1 440.1     Patient Active Problem List   Diagnosis    JUSTIN (acute kidney injury)    Weakness    Hypothyroidism    Hypertension    Lower extremity edema    Elevated troponin    Obesity (BMI 30-39.9)    Class 2 severe obesity with serious comorbidity in adult    Stage 3a chronic kidney disease    Urine incontinence    Leg swelling    Renal artery stenosis     History reviewed. No pertinent past medical history.  Past Surgical History:   Procedure Laterality Date    ORIF ANKLE FRACTURE Right     more than 10years ago      General Information       Row Name 25 0956          Physical Therapy Time and Intention    Document Type therapy note (daily note)  -AR     Mode of Treatment physical therapy  -AR       Row Name 25 0956          General Information    Patient Profile Reviewed yes  -AR     Existing Precautions/Restrictions fall  -AR     Barriers to Rehab none identified  -AR       Row Name 25 0956          Cognition    Orientation Status (Cognition) oriented x 3  -AR               User Key  (r) = Recorded By, (t) = Taken By, (c) = Cosigned By      Initials Name Provider Type    AR Bren Graves PT Physical Therapist                   Mobility       Row Name 25 0957          Bed Mobility    Supine-Sit [place] (Bed Mobility) minimum assist (75% patient effort)  -AR     Sit-Supine [place] (Bed Mobility) moderate assist (50% patient effort)  -AR     Assistive Device (Bed Mobility) bed rails;head of bed elevated;repositioning sheet  -AR     Comment, (Bed  Mobility) cues for sequencing, increased time  -AR       Row Name 02/11/25 0957          Sit-Stand Transfer    Sit-Stand [place] (Transfers) minimum assist (75% patient effort)  -AR     Assistive Device (Sit-Stand Transfers) walker, front-wheeled  -AR       Row Name 02/11/25 0957          Gait/Stairs (Locomotion)    [place] Level (Gait) contact guard  -AR     Assistive Device (Gait) walker, front-wheeled  -AR     Patient was able to Ambulate yes  -AR     Distance in Feet (Gait) 2  -AR     Deviations/Abnormal Patterns (Gait) monica decreased;gait speed decreased;stride length decreased;base of support, wide  -AR     Bilateral Gait Deviations forward flexed posture;heel strike decreased  -AR     Comment, (Gait/Stairs) steps towards HOB  -AR               User Key  (r) = Recorded By, (t) = Taken By, (c) = Cosigned By      Initials Name Provider Type    Bren Espinosa, PT Physical Therapist                   Obj/Interventions       Row Name 02/11/25 0958          Motor Skills    Therapeutic Exercise --  sitting marches 10x  -AR       [facility] Name 02/11/25 0958          Balance    Static Sitting Balance standby assist  -AR     Dynamic Standing Balance minimal assist  -AR               User Key  (r) = Recorded By, (t) = Taken By, (c) = Cosigned By      Initials Name Provider Type    Bren Espinosa, PT Physical Therapist                   Goals/Plan    No documentation.                  Clinical Impression       Row Name 02/11/25 0959          Pain    Pretreatment Pain Rating 0/10 - no pain  -AR     Posttreatment Pain Rating 0/10 - no pain  -AR       [facility] Name 02/11/25 0959          Plan of Care Review    Outcome Evaluation Declined activity tolerance and independence w/ mobility during PT today.  Able to sit up w/ min A, increased time and verbal cues for sequencing.  Able to stand twice w/ min A using RW.  Took steps at EOB w/ min A using RW, limited by fatigue/weakness.  Recommend sitting in chair for  meals, using BSC instead of purewick and DC to SNU.  -AR       Row Name 02/11/25 0959          Therapy Assessment/Plan (PT)    Rehab Potential (PT) good  -AR     Criteria for Skilled Interventions Met (PT) yes  -AR     Therapy Frequency (PT) 5 times/wk  -AR       Row Name 02/11/25 0959          Vital Signs    O2 Delivery Pre Treatment room air  -AR       Row Name 02/11/25 0959          Positioning and Restraints    Pre-Treatment Position in bed  -AR     Post Treatment Position bed  back to bed, RN to give enema after PT  -AR     In Bed notified nsg;supine;call light within reach;encouraged to call for assist;exit alarm on  -AR               User Key  (r) = Recorded By, (t) = Taken By, (c) = Cosigned By      Initials Name Provider Type    Bren Espinosa PT Physical Therapist                   Outcome Measures       Row Name 02/11/25 1001          How much help from another person do you currently need...    Turning from your back to your side while in flat bed without using bedrails? 4  -AR     Moving from lying on back to sitting on the side of a flat bed without bedrails? 2  -AR     Moving to and from a bed to a chair (including a wheelchair)? 3  -AR     Standing up from a chair using your arms (e.g., wheelchair, bedside chair)? 3  -AR     Climbing 3-5 steps with a railing? 1  -AR     To walk in hospital room? 2  -AR     AM-PAC 6 Clicks Score (PT) 15  -AR     Highest Level of Mobility Goal 4 --> Transfer to chair/commode  -AR       Row Name 02/11/25 1001          Functional Assessment    Outcome Measure Options AM-PAC 6 Clicks Basic Mobility (PT)  -AR               User Key  (r) = Recorded By, (t) = Taken By, (c) = Cosigned By      Initials Name Provider Type    Bren Espinosa PT Physical Therapist                                 Physical Therapy Education       Title: PT OT SLP Therapies (In Progress)       Topic: Physical Therapy (In Progress)       Point: Mobility training (In Progress)        Learning Progress Summary            Patient Acceptance, E, NR by AR at 2/11/2025 1002    Acceptance, E,TB,D, VU,NR by PH at 2/7/2025 1552                      Point: Home exercise program (In Progress)       Learning Progress Summary            Patient Acceptance, E, NR by AR at 2/11/2025 1002    Acceptance, E,TB,D, VU,NR by PH at 2/7/2025 1552                      Point: Body mechanics (In Progress)       Learning Progress Summary            Patient Acceptance, E, NR by AR at 2/11/2025 1002    Acceptance, E,TB,D, VU,NR by PH at 2/7/2025 1552    Acceptance, E, VU,NR by MG at 2/6/2025 1757                      Point: Precautions (In Progress)       Learning Progress Summary            Patient Acceptance, E, NR by AR at 2/11/2025 1002    Acceptance, E,TB,D, VU,NR by PH at 2/7/2025 1552    Acceptance, E, VU,NR by MG at 2/6/2025 1757                                      User Key       Initials Effective Dates Name Provider Type Discipline    AR 06/16/21 -  Bren Graves, PT Physical Therapist PT    MG 05/24/22 -  Remedios Lantigua, PT Physical Therapist PT     06/16/21 -  Zita Funes, PTA Physical Therapist Assistant PT                  PT Recommendation and Plan     Outcome Evaluation: Declined activity tolerance and independence w/ mobility during PT today.  Able to sit up w/ min A, increased time and verbal cues for sequencing.  Able to stand twice w/ min A using RW.  Took steps at EOB w/ min A using RW, limited by fatigue/weakness.  Recommend sitting in chair for meals, using BSC instead of purewick and DC to SNU.     Time Calculation:         PT Charges       Row Name 02/11/25 0956             Time Calculation    Start Time 0911  -AR      Stop Time 0936  -AR      Time Calculation (min) 25 min  -AR      PT Received On 02/11/25  -AR      PT - Next Appointment 02/12/25  -AR                User Key  (r) = Recorded By, (t) = Taken By, (c) = Cosigned By      Initials Name Provider Type    AR Star  MARNI Correia Physical Therapist                  Therapy Charges for Today       Code Description Service Date Service Provider Modifiers Qty    05543010142 HC PT THER PROC EA 15 MIN 2/11/2025 Bren Graves, PT GP 1    77983233041 HC PT THERAPEUTIC ACT EA 15 MIN 2/11/2025 Bren Graves, PT GP 1            PT G-Codes  Outcome Measure Options: AM-PAC 6 Clicks Basic Mobility (PT)  AM-PAC 6 Clicks Score (PT): 15  AM-PAC 6 Clicks Score (OT): 15  PT Discharge Summary  Anticipated Discharge Disposition (PT): skilled nursing facility    Bren Graves PT  2/11/2025

## 2025-03-24 ENCOUNTER — TELEPHONE (OUTPATIENT)
Dept: INTERNAL MEDICINE | Facility: CLINIC | Age: 70
End: 2025-03-24
Payer: MEDICARE

## 2025-03-24 NOTE — TELEPHONE ENCOUNTER
Caller: EZEQUIELGAIL LARES    Relationship: Home Health    Best call back number: [phone]     What orders are you requesting (i.e. lab or imaging): NURSING AND PHYSICAL THERAPY.     Additional notes: ARNAUD IS REQUESTING A VERBAL ORDER FOR A PT AND NURSING EVALUATION.

## 2025-03-27 ENCOUNTER — OFFICE VISIT (OUTPATIENT)
Dept: INTERNAL MEDICINE | Facility: CLINIC | Age: 70
End: 2025-03-27
Payer: MEDICARE

## 2025-03-27 VITALS
HEIGHT: 62 IN | BODY MASS INDEX: 39.94 KG/M2 | DIASTOLIC BLOOD PRESSURE: 88 MMHG | OXYGEN SATURATION: 99 % | HEART RATE: 82 BPM | TEMPERATURE: 97.3 F | SYSTOLIC BLOOD PRESSURE: 136 MMHG | WEIGHT: 217.04 LBS

## 2025-03-27 DIAGNOSIS — N18.31 STAGE 3A CHRONIC KIDNEY DISEASE: Chronic | ICD-10-CM

## 2025-03-27 DIAGNOSIS — E78.2 MIXED HYPERLIPIDEMIA: Chronic | ICD-10-CM

## 2025-03-27 DIAGNOSIS — R60.0 LOWER EXTREMITY EDEMA: ICD-10-CM

## 2025-03-27 DIAGNOSIS — R53.1 WEAKNESS: ICD-10-CM

## 2025-03-27 DIAGNOSIS — I10 PRIMARY HYPERTENSION: Chronic | ICD-10-CM

## 2025-03-27 DIAGNOSIS — R29.6 FREQUENT FALLS: Chronic | ICD-10-CM

## 2025-03-27 DIAGNOSIS — R32 URINARY INCONTINENCE, UNSPECIFIED TYPE: Chronic | ICD-10-CM

## 2025-03-27 DIAGNOSIS — I70.1 RENAL ARTERY STENOSIS: Chronic | ICD-10-CM

## 2025-03-27 DIAGNOSIS — E03.9 HYPOTHYROIDISM, UNSPECIFIED TYPE: Chronic | ICD-10-CM

## 2025-03-27 DIAGNOSIS — Z76.89 ENCOUNTER TO ESTABLISH CARE: Primary | ICD-10-CM

## 2025-03-27 DIAGNOSIS — I73.9 PERIPHERAL ARTERIAL DISEASE: Chronic | ICD-10-CM

## 2025-03-27 RX ORDER — ASPIRIN 81 MG/1
81 TABLET ORAL DAILY
Qty: 90 TABLET | Refills: 0 | Status: SHIPPED | OUTPATIENT
Start: 2025-03-27 | End: 2025-04-03 | Stop reason: SDUPTHER

## 2025-03-27 RX ORDER — METOPROLOL SUCCINATE 25 MG/1
25 TABLET, EXTENDED RELEASE ORAL
Qty: 90 TABLET | Refills: 1 | Status: SHIPPED | OUTPATIENT
Start: 2025-03-27 | End: 2025-04-03 | Stop reason: SDUPTHER

## 2025-03-27 RX ORDER — ATORVASTATIN CALCIUM 20 MG/1
20 TABLET, FILM COATED ORAL DAILY
Qty: 90 TABLET | Refills: 1 | Status: SHIPPED | OUTPATIENT
Start: 2025-03-27 | End: 2025-04-03 | Stop reason: SDUPTHER

## 2025-03-27 RX ORDER — TORSEMIDE 20 MG/1
20 TABLET ORAL DAILY
Qty: 30 TABLET | Refills: 0 | Status: SHIPPED | OUTPATIENT
Start: 2025-03-27 | End: 2025-04-09 | Stop reason: HOSPADM

## 2025-03-27 RX ORDER — OXYBUTYNIN CHLORIDE 5 MG/1
5 TABLET, EXTENDED RELEASE ORAL DAILY
Qty: 90 TABLET | Refills: 1 | Status: SHIPPED | OUTPATIENT
Start: 2025-03-27 | End: 2025-04-03 | Stop reason: SDUPTHER

## 2025-03-27 NOTE — PROGRESS NOTES
Chief Complaint  Establish Care, Med Refill, Foot Swelling, and Leg Swelling     Subjective:      History of Present Illness {CC  Problem List  Visit  Diagnosis   Encounters  Notes  Medications  Labs  Result Review Imaging  Media :23}     Rika Millan presents to [facility] for:      History of Present Illness        The patient is here today as a new patient. She was recently admitted to [facility] from 02/04/2025 to 02/11/2025 for weakness and frequent falls, which were new symptoms for her. While in the hospital, she was diagnosed with hypothyroidism and myxedema and started on Synthroid replacement. Her TSH level decreased from 88 to 22 during her stay at a rehab facility. The patient was also diagnosed with acute kidney injury, believed to be secondary to chronic kidney disease. She was seen by nephrology in the hospital and has a follow-up appointment pending. The patient received multiple doses of IV Lasix for edematous legs, thought to be due to her thyroid condition or peripheral vascular disease. She was not discharged with any diuretics. The patient has pitting, weeping edema in both legs and a skin tear on her left shin that has not healed since her admission. Her legs were wrapped with compression bandages in the office today, and she was advised to continue using them or compression stockings. The patient will follow up with vascular and has an appointment scheduled. She was also diagnosed with hyperlipidemia and peripheral vascular disease with right renal artery stenosis. ABIs performed in the hospital indicated mild peripheral artery disease, and she was started on aspirin. The patient continues to experience weakness and frequent falls. Physical therapy was ordered but not signed off due to the lack of a primary care provider. She does not live alone; she resides with her , who she reports is supportive. The patient suffers from  [facility]    PROGRESS NOTE    Patient:  Hue Madison    :  2002    Date of Service:  6/10/21 Session 4    The primary encounter diagnosis was SHARIFA (generalized anxiety disorder). A diagnosis of Mild recurrent major depression (CMS/HCC) was also pertinent to this visit.    Data:  Seen via videoconference due to pandemic precautions. Hue discussed continued desire for a letter for an emotional support animal. She also discussed concerns to address fears and hesitance in close relationships in future therapy.     Intervention:  Psychoeducation and Acceptance Commitment Therapy    Patient continues to be involved in service planning:  YES    Describe above interventions:  Offered Hue feedback about the issues surrounding emotional support animals. Provided her with resources and encouraged her to utilize them to find a provider that has competence in animal assisted treatment.     Patient's response to interventions:  Hue expressed understanding.    Continue to support patient's efforts and progress towards established treatment plan goals in the following ways:  Transfer to new clinician.    Off-site:  Yes:  Off-site location:  Telehealth   This visit is being performed virtually.  Clinician Location: [facility]  Hue's Location: Home   55 minutes spent in this encounter.   urinary incontinence but is unsure if it is true incontinence or a result of inactivity. She has a follow-up appointment with urology and is on Ditropan for stress incontinence. The patient was advised to make scheduled restroom visits, use the bedside commode, and encourage herself to get up to build muscle strength and use the restroom. She was also advised not to rely on briefs, as they may trigger incontinence. The patient is malodorous of urine. The patient is on metoprolol for blood pressure management and was strongly advised to avoid ARBs and ACEs due to her kidney function and right renal artery stenosis. The patient has not previously had a primary care provider.      The patient was also diagnosed with hyperlipidemia and peripheral vascular disease with right renal artery stenosis. ABIs performed in the hospital indicated mild peripheral artery disease, and she was started on aspirin.    SOCIAL HISTORY  The patient does not live alone; she resides with her , who she reports is supportive.    MEDICATIONS  Current: Synthroid, aspirin, Ditropan, metoprolol         I have reviewed patient's medical history, any new submitted information provided by patient or medical assistant and updated medical record.      Objective:      Physical Exam  Vitals reviewed.   Constitutional:       Comments: Odorous    HENT:      Head: Normocephalic.   Cardiovascular:      Rate and Rhythm: Normal rate and regular rhythm.      Pulses: Normal pulses.      Heart sounds: Normal heart sounds.   Pulmonary:      Effort: Pulmonary effort is normal.      Breath sounds: Normal breath sounds.   Musculoskeletal:         General: Swelling (bilat legs) present.      Right lower leg: Edema (2+ pitting) present.      Left lower leg: Edema (2+pitting) present.   Skin:     General: Skin is warm and dry.      Capillary Refill: Capillary refill takes less than 2 seconds.   Neurological:      General: No focal deficit present.      Mental  "Status: She is alert.      Motor: Weakness present.      Gait: Gait abnormal (in wheelchair).   Psychiatric:         Mood and Affect: Mood normal.         Behavior: Behavior normal.        Result Review  Data Reviewed:{ Labs  Result Review  Imaging  Med Tab  Media :23}     Results  Laboratory Studies  TSH was previously 88, then decreased to 22.    Testing  ABIs were consistent with mild peripheral artery disease.                  Vital Signs:   /88 (BP Location: Left arm, Patient Position: Sitting, Cuff Size: Large Adult)   Pulse 82   Temp 97.3 °F (36.3 °C) (Oral)   Ht 157.5 cm (62\")   Wt 98.4 kg (217 lb 0.6 oz)   SpO2 99%   BMI 39.70 kg/m²                 Requested Prescriptions      No prescriptions requested or ordered in this encounter       Routine medications provided by this office will also be refilled via pharmacy request.       Current Outpatient Medications:     acetaminophen (TYLENOL) 325 MG tablet, Take 2 tablets by mouth Every 6 (Six) Hours As Needed for Mild Pain., Disp: , Rfl:     aspirin 81 MG EC tablet, Take 1 tablet by mouth Daily., Disp: 90 tablet, Rfl: 0    atorvastatin (LIPITOR) 20 MG tablet, Take 1 tablet by mouth Daily., Disp: 90 tablet, Rfl: 1    levothyroxine (SYNTHROID, LEVOTHROID) 100 MCG tablet, Take 1 tablet by mouth Every Morning., Disp: 30 tablet, Rfl: 0    metoprolol succinate XL (TOPROL-XL) 25 MG 24 hr tablet, Take 1 tablet by mouth Daily., Disp: 90 tablet, Rfl: 1    oxybutynin XL (DITROPAN-XL) 10 MG 24 hr tablet, Take 1 tablet by mouth Daily., Disp: 30 tablet, Rfl: 0    polyethylene glycol (MIRALAX) 17 g packet, Take 17 g by mouth 2 (Two) Times a Day As Needed (As needed for constipation)., Disp: , Rfl:     spironolactone (ALDACTONE) 25 MG tablet, Take 1 tablet by mouth Daily., Disp: 30 tablet, Rfl: 0    torsemide 40 MG tablet, Take 40 mg by mouth Daily., Disp: 30 tablet, Rfl: 0     Assessment and Plan:      Assessment and Plan {CC Problem List  Visit Diagnosis  " ROS  Review (Popup)  [facility]  Quality  BestPractice  Medications  SmartSets  SnapShot Encounters  Media :23}     Problem List Items Addressed This Visit       Weakness    Relevant Orders    Ambulatory Referral to Home Health (Completed)    Hypothyroidism    Relevant Orders    TSH (Completed)    T4, free (Completed)    T3, free (Completed)    Hypertension    Lower extremity edema    Stage 3a chronic kidney disease    Urine incontinence    Renal artery stenosis    Peripheral arterial disease    Frequent falls    Relevant Orders    Ambulatory Referral to Home Health (Completed)     Other Visit Diagnoses         Encounter to establish care    -  Primary      Mixed hyperlipidemia  (Chronic)                  1. Hypothyroidism.  The patient was recently diagnosed with hypothyroidism and was initiated on Synthroid replacement while in the hospital. A thyroid panel will be obtained today to monitor her levels.    2. Myxedema.  The patient was admitted to the hospital for myxedema and has been started on Synthroid. Continued monitoring of thyroid function is necessary.    3. Acute kidney injury.  The patient experienced acute kidney injury secondary to chronic kidney disease. She has a follow-up with nephrology that needs to be scheduled. The patient was given a number to call and schedule this appointment.    4. Chronic kidney disease.  The patient's acute kidney injury is secondary to chronic kidney disease. Continued monitoring of kidney function is necessary. The patient was advised to avoid ARBs and ACEs due to her kidney function and right renal artery stenosis.    5. Edematous legs.  The patient has pitting, weeping edema in her legs, likely secondary to hypothyroidism or peripheral vascular disease. Her legs were wrapped with compression bandages today, and she was educated to continue using compression stockings. Torsemide 20 mg will be initiated to decrease the edema and slightly lower her blood  pressure.    6. Hyperlipidemia.  The patient was diagnosed with hyperlipidemia in the hospital. Continued monitoring and management of lipid levels are necessary.    7. Peripheral vascular disease.  The patient has peripheral vascular disease with right renal artery stenosis. She is currently on aspirin therapy.    8. Urinary incontinence.  The patient suffers from urinary incontinence and is currently on Ditropan for stress incontinence. She was educated on making scheduled restroom visits and using the bedside commode to build muscle strength. The patient has a follow-up with urology scheduled.    9. Blood pressure management.  The patient is currently on metoprolol for blood pressure management. Her blood pressure is stable but elevated. Continued monitoring and medication adjustment as necessary.        Follow-up  The patient will follow up in 3 months or sooner if any concerns or issues arise.     I spent 74 minutes caring for Rika on this date of service. This time includes time spent by me in the following activities: preparing for the visit, reviewing tests, performing a medically appropriate examination and/or evaluation, counseling and educating the patient/family/caregiver, referring and communicating with other health care professionals, documenting information in the medical record, independently interpreting results and communicating that information with the patient/family/caregiver, care coordination, ordering medications, ordering test(s), ordering procedure(s), obtaining a separately obtained history, reviewing a separately obtained history, and pt has many chronic and new acute concerns. Pt required wrapping of her legs and had a family member there.       Follow Up {Instructions Charge Capture  Follow-up Communications :23}     Return for Medicare Wellness.      Patient was given instructions and counseling regarding her condition or for health maintenance advice. Please see specific  information pulled into the AVS if appropriate.    Mary disclaimer:   Much of this encounter note is an electronic transcription/translation of spoken language to printed text. The electronic translation of spoken language may permit erroneous, or at times, nonsensical words or phrases to be inadvertently transcribed; Although I have reviewed the note for such errors, some may still exist.         Additional Patient Counseling:       There are no Patient Instructions on file for this visit.    Patient or patient representative verbalized consent for the use of Ambient Listening during the visit with  ROBIN Cavanaugh for chart documentation. 4/16/2025  09:39 EDT

## 2025-03-28 LAB
T3FREE SERPL-MCNC: 2.2 PG/ML (ref 2–4.4)
T4 FREE SERPL-MCNC: 1.35 NG/DL (ref 0.92–1.68)
TSH SERPL DL<=0.005 MIU/L-ACNC: 9.18 UIU/ML (ref 0.27–4.2)

## 2025-04-01 ENCOUNTER — TELEPHONE (OUTPATIENT)
Dept: INTERNAL MEDICINE | Facility: CLINIC | Age: 70
End: 2025-04-01

## 2025-04-01 NOTE — TELEPHONE ENCOUNTER
Caller: ARNAUD COSME    Relationship: HOME HEALTH    Best call back number: [phone]    Who are you requesting to speak with (clinical staff, provider,  specific staff member): CLINICAL STAFF    What was the call regarding: STATED THAT THEY ARE NEEDING TO SEE IF THEY CAN GET VERBAL ORDERS FOR SKILLED NURSING AND PHYSICAL THERAPY. PLEASE CALL AND ADVISE

## 2025-04-02 ENCOUNTER — TELEPHONE (OUTPATIENT)
Dept: INTERNAL MEDICINE | Facility: CLINIC | Age: 70
End: 2025-04-02
Payer: MEDICARE

## 2025-04-02 NOTE — TELEPHONE ENCOUNTER
DELETE AFTER REVIEWING: Send this encounter to the appropriate pool. See your Call Action Grid or Workflows for direction.    Caller: ARNAUD BAEZA    Relationship: Home Health    Best call back number:     [phone]       What orders are you requesting (i.e. lab or imaging): VERBAL ORDERS      Where will you receive your lab/imaging services: PATIENT'S HOME    Additional notes:     SKILLED NURSING AND PT    EVALUATION AT THIS TIME

## 2025-04-03 DIAGNOSIS — R32 URINARY INCONTINENCE, UNSPECIFIED TYPE: ICD-10-CM

## 2025-04-03 DIAGNOSIS — I73.9 PERIPHERAL ARTERIAL DISEASE: ICD-10-CM

## 2025-04-03 DIAGNOSIS — I70.1 RENAL ARTERY STENOSIS: ICD-10-CM

## 2025-04-03 DIAGNOSIS — I10 PRIMARY HYPERTENSION: ICD-10-CM

## 2025-04-03 NOTE — TELEPHONE ENCOUNTER
Caller: [facility] - [place], [place] - [address] - [phone] [facility] [phone]   Relationship: Pharmacy    Best call back number:[phone] OPTION 2    Requested Prescriptions:   Requested Prescriptions     Pending Prescriptions Disp Refills    aspirin 81 MG EC tablet 90 tablet 0     Sig: Take 1 tablet by mouth Daily.    atorvastatin (LIPITOR) 20 MG tablet 90 tablet 1     Sig: Take 1 tablet by mouth Daily.    levothyroxine (SYNTHROID, LEVOTHROID) 75 MCG tablet 30 tablet 1     Sig: Take 1 tablet by mouth Every Morning.    metoprolol succinate XL (TOPROL-XL) 25 MG 24 hr tablet 90 tablet 1     Sig: Take 1 tablet by mouth Daily.    oxybutynin XL (DITROPAN-XL) 5 MG 24 hr tablet 90 tablet 1     Sig: Take 1 tablet by mouth Daily.    polyethylene glycol (MIRALAX) 17 g packet       Sig: Take 17 g by mouth 2 (Two) Times a Day As Needed (As needed for constipation).        Pharmacy where request should be sent: [facility] [place] - [place] [id number] - [phone] [facility] [phone] FX     Last office visit with prescribing clinician: 3/27/2025   Last telemedicine visit with prescribing clinician: Visit date not found   Next office visit with prescribing clinician: 6/27/2025     Yohannes Franklin Rep   04/03/25 15:55 EDT

## 2025-04-04 RX ORDER — ASPIRIN 81 MG/1
81 TABLET ORAL DAILY
Qty: 90 TABLET | Refills: 0 | Status: ON HOLD | OUTPATIENT
Start: 2025-04-04

## 2025-04-04 RX ORDER — LEVOTHYROXINE SODIUM 75 UG/1
75 TABLET ORAL
Qty: 30 TABLET | Refills: 1 | Status: ON HOLD | OUTPATIENT
Start: 2025-04-04

## 2025-04-04 RX ORDER — METOPROLOL SUCCINATE 25 MG/1
25 TABLET, EXTENDED RELEASE ORAL
Qty: 90 TABLET | Refills: 1 | Status: ON HOLD | OUTPATIENT
Start: 2025-04-04

## 2025-04-04 RX ORDER — ATORVASTATIN CALCIUM 20 MG/1
20 TABLET, FILM COATED ORAL DAILY
Qty: 90 TABLET | Refills: 1 | Status: ON HOLD | OUTPATIENT
Start: 2025-04-04

## 2025-04-04 RX ORDER — POLYETHYLENE GLYCOL 3350 17 G/17G
17 POWDER, FOR SOLUTION ORAL 2 TIMES DAILY PRN
Status: ON HOLD
Start: 2025-04-04

## 2025-04-04 RX ORDER — OXYBUTYNIN CHLORIDE 5 MG/1
5 TABLET, EXTENDED RELEASE ORAL DAILY
Qty: 90 TABLET | Refills: 1 | Status: ON HOLD | OUTPATIENT
Start: 2025-04-04

## 2025-04-06 ENCOUNTER — HOSPITAL ENCOUNTER (INPATIENT)
Facility: HOSPITAL | Age: 70
LOS: 3 days | Discharge: HOME | DRG: 641 | End: 2025-04-09
Attending: EMERGENCY MEDICINE | Admitting: INTERNAL MEDICINE
Payer: MEDICARE

## 2025-04-06 ENCOUNTER — APPOINTMENT (OUTPATIENT)
Dept: GENERAL RADIOLOGY | Facility: HOSPITAL | Age: 70
DRG: 641 | End: 2025-04-06
Payer: MEDICARE

## 2025-04-06 DIAGNOSIS — R60.0 PERIPHERAL EDEMA: Primary | ICD-10-CM

## 2025-04-06 DIAGNOSIS — N17.9 AKI (ACUTE KIDNEY INJURY): ICD-10-CM

## 2025-04-06 DIAGNOSIS — E03.9 HYPOTHYROIDISM, UNSPECIFIED TYPE: ICD-10-CM

## 2025-04-06 LAB
ALBUMIN SERPL-MCNC: 4 G/DL (ref 3.5–5.2)
ALBUMIN/GLOB SERPL: 1 G/DL
ALP SERPL-CCNC: 126 U/L (ref 39–117)
ALT SERPL W P-5'-P-CCNC: 9 U/L (ref 1–33)
ANION GAP SERPL CALCULATED.3IONS-SCNC: 12.1 MMOL/L (ref 5–15)
AST SERPL-CCNC: 11 U/L (ref 1–32)
BASOPHILS # BLD AUTO: 0.03 10*3/MM3 (ref 0–0.2)
BASOPHILS NFR BLD AUTO: 0.3 % (ref 0–1.5)
BILIRUB SERPL-MCNC: 0.5 MG/DL (ref 0–1.2)
BUN SERPL-MCNC: 43 MG/DL (ref 8–23)
BUN/CREAT SERPL: 30.3 (ref 7–25)
CALCIUM SPEC-SCNC: 9.8 MG/DL (ref 8.6–10.5)
CHLORIDE SERPL-SCNC: 101 MMOL/L (ref 98–107)
CO2 SERPL-SCNC: 26.9 MMOL/L (ref 22–29)
CREAT SERPL-MCNC: 1.42 MG/DL (ref 0.57–1)
DEPRECATED RDW RBC AUTO: 48 FL (ref 37–54)
EGFRCR SERPLBLD CKD-EPI 2021: 39.9 ML/MIN/1.73
EOSINOPHIL # BLD AUTO: 0.21 10*3/MM3 (ref 0–0.4)
EOSINOPHIL NFR BLD AUTO: 2.1 % (ref 0.3–6.2)
ERYTHROCYTE [DISTWIDTH] IN BLOOD BY AUTOMATED COUNT: 14.5 % (ref 12.3–15.4)
GEN 5 1HR TROPONIN T REFLEX: 76 NG/L
GLOBULIN UR ELPH-MCNC: 3.9 GM/DL
GLUCOSE SERPL-MCNC: 113 MG/DL (ref 65–99)
HCT VFR BLD AUTO: 37.4 % (ref 34–46.6)
HGB BLD-MCNC: 11.7 G/DL (ref 12–15.9)
IMM GRANULOCYTES # BLD AUTO: 0.04 10*3/MM3 (ref 0–0.05)
IMM GRANULOCYTES NFR BLD AUTO: 0.4 % (ref 0–0.5)
LYMPHOCYTES # BLD AUTO: 1.49 10*3/MM3 (ref 0.7–3.1)
LYMPHOCYTES NFR BLD AUTO: 15 % (ref 19.6–45.3)
MCH RBC QN AUTO: 28.9 PG (ref 26.6–33)
MCHC RBC AUTO-ENTMCNC: 31.3 G/DL (ref 31.5–35.7)
MCV RBC AUTO: 92.3 FL (ref 79–97)
MONOCYTES # BLD AUTO: 0.54 10*3/MM3 (ref 0.1–0.9)
MONOCYTES NFR BLD AUTO: 5.4 % (ref 5–12)
NEUTROPHILS NFR BLD AUTO: 7.63 10*3/MM3 (ref 1.7–7)
NEUTROPHILS NFR BLD AUTO: 76.8 % (ref 42.7–76)
NRBC BLD AUTO-RTO: 0 /100 WBC (ref 0–0.2)
NT-PROBNP SERPL-MCNC: 3390 PG/ML (ref 0–900)
PLATELET # BLD AUTO: 321 10*3/MM3 (ref 140–450)
PMV BLD AUTO: 11.6 FL (ref 6–12)
POTASSIUM SERPL-SCNC: 3.1 MMOL/L (ref 3.5–5.2)
PROT SERPL-MCNC: 7.9 G/DL (ref 6–8.5)
QT INTERVAL: 469 MS
QTC INTERVAL: 444 MS
RBC # BLD AUTO: 4.05 10*6/MM3 (ref 3.77–5.28)
SODIUM SERPL-SCNC: 140 MMOL/L (ref 136–145)
T4 FREE SERPL-MCNC: 1.02 NG/DL (ref 0.92–1.68)
TROPONIN T % DELTA: -5
TROPONIN T NUMERIC DELTA: -4 NG/L
TROPONIN T SERPL HS-MCNC: 80 NG/L
TSH SERPL DL<=0.05 MIU/L-ACNC: 18 UIU/ML (ref 0.27–4.2)
WBC NRBC COR # BLD AUTO: 9.94 10*3/MM3 (ref 3.4–10.8)

## 2025-04-06 PROCEDURE — 84443 ASSAY THYROID STIM HORMONE: CPT | Performed by: NURSE PRACTITIONER

## 2025-04-06 PROCEDURE — 93005 ELECTROCARDIOGRAM TRACING: CPT | Performed by: NURSE PRACTITIONER

## 2025-04-06 PROCEDURE — 71045 X-RAY EXAM CHEST 1 VIEW: CPT

## 2025-04-06 PROCEDURE — 85025 COMPLETE CBC W/AUTO DIFF WBC: CPT | Performed by: NURSE PRACTITIONER

## 2025-04-06 PROCEDURE — 84439 ASSAY OF FREE THYROXINE: CPT | Performed by: NURSE PRACTITIONER

## 2025-04-06 PROCEDURE — 83880 ASSAY OF NATRIURETIC PEPTIDE: CPT | Performed by: NURSE PRACTITIONER

## 2025-04-06 PROCEDURE — 93010 ELECTROCARDIOGRAM REPORT: CPT | Performed by: INTERNAL MEDICINE

## 2025-04-06 PROCEDURE — 80053 COMPREHEN METABOLIC PANEL: CPT | Performed by: NURSE PRACTITIONER

## 2025-04-06 PROCEDURE — 99285 EMERGENCY DEPT VISIT HI MDM: CPT

## 2025-04-06 PROCEDURE — 25010000002 FUROSEMIDE PER 20 MG: Performed by: NURSE PRACTITIONER

## 2025-04-06 PROCEDURE — 84484 ASSAY OF TROPONIN QUANT: CPT | Performed by: NURSE PRACTITIONER

## 2025-04-06 PROCEDURE — 36415 COLL VENOUS BLD VENIPUNCTURE: CPT

## 2025-04-06 RX ORDER — METOPROLOL SUCCINATE 25 MG/1
25 TABLET, EXTENDED RELEASE ORAL
Status: DISCONTINUED | OUTPATIENT
Start: 2025-04-07 | End: 2025-04-09 | Stop reason: HOSPADM

## 2025-04-06 RX ORDER — OXYBUTYNIN CHLORIDE 5 MG/1
5 TABLET, EXTENDED RELEASE ORAL DAILY
Status: DISCONTINUED | OUTPATIENT
Start: 2025-04-07 | End: 2025-04-07

## 2025-04-06 RX ORDER — ATORVASTATIN CALCIUM 20 MG/1
20 TABLET, FILM COATED ORAL DAILY
Status: DISCONTINUED | OUTPATIENT
Start: 2025-04-07 | End: 2025-04-09 | Stop reason: HOSPADM

## 2025-04-06 RX ORDER — AMOXICILLIN 250 MG
2 CAPSULE ORAL 2 TIMES DAILY PRN
Status: DISCONTINUED | OUTPATIENT
Start: 2025-04-06 | End: 2025-04-09 | Stop reason: HOSPADM

## 2025-04-06 RX ORDER — ACETAMINOPHEN 325 MG/1
650 TABLET ORAL EVERY 4 HOURS PRN
Status: DISCONTINUED | OUTPATIENT
Start: 2025-04-06 | End: 2025-04-09 | Stop reason: HOSPADM

## 2025-04-06 RX ORDER — BISACODYL 10 MG
10 SUPPOSITORY, RECTAL RECTAL DAILY PRN
Status: DISCONTINUED | OUTPATIENT
Start: 2025-04-06 | End: 2025-04-09 | Stop reason: HOSPADM

## 2025-04-06 RX ORDER — SODIUM CHLORIDE 0.9 % (FLUSH) 0.9 %
10 SYRINGE (ML) INJECTION AS NEEDED
Status: DISCONTINUED | OUTPATIENT
Start: 2025-04-06 | End: 2025-04-09 | Stop reason: HOSPADM

## 2025-04-06 RX ORDER — ACETAMINOPHEN 160 MG/5ML
650 SOLUTION ORAL EVERY 4 HOURS PRN
Status: DISCONTINUED | OUTPATIENT
Start: 2025-04-06 | End: 2025-04-09 | Stop reason: HOSPADM

## 2025-04-06 RX ORDER — FUROSEMIDE 10 MG/ML
40 INJECTION INTRAMUSCULAR; INTRAVENOUS EVERY 12 HOURS
Status: DISCONTINUED | OUTPATIENT
Start: 2025-04-07 | End: 2025-04-09 | Stop reason: HOSPADM

## 2025-04-06 RX ORDER — BISACODYL 5 MG/1
5 TABLET, DELAYED RELEASE ORAL DAILY PRN
Status: DISCONTINUED | OUTPATIENT
Start: 2025-04-06 | End: 2025-04-09 | Stop reason: HOSPADM

## 2025-04-06 RX ORDER — LEVOTHYROXINE SODIUM 75 UG/1
75 TABLET ORAL
Status: DISCONTINUED | OUTPATIENT
Start: 2025-04-07 | End: 2025-04-07

## 2025-04-06 RX ORDER — ONDANSETRON 4 MG/1
4 TABLET, ORALLY DISINTEGRATING ORAL EVERY 6 HOURS PRN
Status: DISCONTINUED | OUTPATIENT
Start: 2025-04-06 | End: 2025-04-09 | Stop reason: HOSPADM

## 2025-04-06 RX ORDER — POLYETHYLENE GLYCOL 3350 17 G/17G
17 POWDER, FOR SOLUTION ORAL DAILY PRN
Status: DISCONTINUED | OUTPATIENT
Start: 2025-04-06 | End: 2025-04-09 | Stop reason: HOSPADM

## 2025-04-06 RX ORDER — ACETAMINOPHEN 650 MG/1
650 SUPPOSITORY RECTAL EVERY 4 HOURS PRN
Status: DISCONTINUED | OUTPATIENT
Start: 2025-04-06 | End: 2025-04-09 | Stop reason: HOSPADM

## 2025-04-06 RX ORDER — FUROSEMIDE 10 MG/ML
80 INJECTION INTRAMUSCULAR; INTRAVENOUS ONCE
Status: COMPLETED | OUTPATIENT
Start: 2025-04-06 | End: 2025-04-06

## 2025-04-06 RX ORDER — ASPIRIN 81 MG/1
81 TABLET ORAL DAILY
Status: DISCONTINUED | OUTPATIENT
Start: 2025-04-07 | End: 2025-04-09 | Stop reason: HOSPADM

## 2025-04-06 RX ORDER — ONDANSETRON 2 MG/ML
4 INJECTION INTRAMUSCULAR; INTRAVENOUS EVERY 6 HOURS PRN
Status: DISCONTINUED | OUTPATIENT
Start: 2025-04-06 | End: 2025-04-09 | Stop reason: HOSPADM

## 2025-04-06 RX ADMIN — FUROSEMIDE 80 MG: 10 INJECTION, SOLUTION INTRAMUSCULAR; INTRAVENOUS at 15:45

## 2025-04-06 RX ADMIN — MUPIROCIN 1 APPLICATION: 20 OINTMENT TOPICAL at 18:20

## 2025-04-06 NOTE — ED PROVIDER NOTES
EMERGENCY DEPARTMENT ENCOUNTER  Room Number:  06/06  PCP: Bing Saavedra APRN  Independent Historians: Patient and Family      HPI:  Chief Complaint: had concerns including Leg Swelling.     A complete HPI/ROS/PMH/PSH/SH/FH are unobtainable due to: None    Chronic or social conditions impacting patient care (Social Determinants of Health): None      Context: Rika Millan is a 69 y.o. female with a medical history of hypothyroidism, PAD who presents to the ED c/o acute bilateral lower extremity edema.  She was recently admitted for similar symptoms and was found to have a hypothyroidism started Synthroid.  She states over the past 2 weeks the swelling in her legs has increased.  She is taking Torsemide as prescribed.  She uses a wheelchair to get around because it is so painful and difficult for her to walk with the leg swelling.  She denies chest pain, shortness of breath, dizziness, fever, chills.      Review of prior external notes (non-ED) -and- Review of prior external test results outside of this encounter:  Records reviewed in [facility] patient was admitted 2/4 - 2/11/2025 for hypothyroidism, PAD she was started on oral Synthroid.  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    Prescription drug monitoring program review:     N/A    PAST MEDICAL HISTORY  Active Ambulatory Problems     Diagnosis Date Noted    JUSTIN (acute kidney injury) 02/04/2025    Weakness 02/04/2025    Hypothyroidism 02/04/2025    Hypertension 02/04/2025    Lower extremity edema 02/04/2025    Elevated troponin 02/04/2025    Obesity (BMI 30-39.9) 02/04/2025    Class 2 severe obesity with serious comorbidity in adult 02/08/2025    Stage 3a chronic kidney disease 02/08/2025    Urine incontinence 02/09/2025    Leg swelling 02/10/2025    Renal artery stenosis 02/10/2025    Peripheral arterial disease 02/11/2025     Resolved Ambulatory Problems     Diagnosis Date Noted    No Resolved  Ambulatory Problems     No Additional Past Medical History         PAST SURGICAL HISTORY  Past Surgical History:   Procedure Laterality Date    ORIF ANKLE FRACTURE Right     more than 10years ago         FAMILY HISTORY  Family History   Problem Relation Age of Onset    No Known Problems Mother     No Known Problems Father          SOCIAL HISTORY  Social History     Socioeconomic History    Marital status:    Tobacco Use    Smoking status: Former     Current packs/day: 0.00     Average packs/day: 0.5 packs/day for 40.0 years (20.8 ttl pk-yrs)     Types: Cigarettes     Start date:      Quit date:      Years since quittin.2    Smokeless tobacco: Never    Tobacco comments:     4-5 sticks per day   Vaping Use    Vaping status: Never Used   Substance and Sexual Activity    Alcohol use: Not Currently    Drug use: Never    Sexual activity: Defer         ALLERGIES  Codeine, Morphine, and Percocet [oxycodone-acetaminophen]      REVIEW OF SYSTEMS  Review of Systems  Included in HPI  All systems reviewed and negative except for those discussed in HPI.      PHYSICAL EXAM    I have reviewed the triage vital signs and nursing notes.    ED Triage Vitals   Temp Heart Rate Resp BP SpO2   25 1247 25 1247 25 1247 25 1252 25 1247   96.7 °F (35.9 °C) 60 18 147/87 94 %       Physical Exam    GENERAL: Well appearing, nontoxic appearing, not distressed  HENT: normocephalic, atraumatic  EYES: no scleral icterus, PERRL  CV: regular rhythm, regular rate, no murmur  RESPIRATORY: normal effort, CTAB  ABDOMEN: soft   MUSCULOSKELETAL: no deformity  Bilateral lower extremities are extremely swollen, there is a weeping from the right lower leg.  NEURO: alert, moves all extremities, follows commands, mental status normal/baseline  SKIN: warm, dry, no rash   Psych: Appropriate mood and affect  Nursing notes and vital signs reviewed    Vital signs and nursing notes reviewed.                LAB  RESULTS  Recent Results (from the past 24 hours)   Comprehensive Metabolic Panel    Collection Time: 04/06/25  1:23 PM    Specimen: Blood   Result Value Ref Range    Glucose 113 (H) 65 - 99 mg/dL    BUN 43 (H) 8 - 23 mg/dL    Creatinine 1.42 (H) 0.57 - 1.00 mg/dL    Sodium 140 136 - 145 mmol/L    Potassium 3.1 (L) 3.5 - 5.2 mmol/L    Chloride 101 98 - 107 mmol/L    CO2 26.9 22.0 - 29.0 mmol/L    Calcium 9.8 8.6 - 10.5 mg/dL    Total Protein 7.9 6.0 - 8.5 g/dL    Albumin 4.0 3.5 - 5.2 g/dL    ALT (SGPT) 9 1 - 33 U/L    AST (SGOT) 11 1 - 32 U/L    Alkaline Phosphatase 126 (H) 39 - 117 U/L    Total Bilirubin 0.5 0.0 - 1.2 mg/dL    Globulin 3.9 gm/dL    A/G Ratio 1.0 g/dL    BUN/Creatinine Ratio 30.3 (H) 7.0 - 25.0    Anion Gap 12.1 5.0 - 15.0 mmol/L    eGFR 39.9 (L) >60.0 mL/min/1.73   BNP    Collection Time: 04/06/25  1:23 PM    Specimen: Blood   Result Value Ref Range    proBNP 3,390.0 (H) 0.0 - 900.0 pg/mL   High Sensitivity Troponin T    Collection Time: 04/06/25  1:23 PM    Specimen: Blood   Result Value Ref Range    HS Troponin T 80 (C) <14 ng/L   TSH    Collection Time: 04/06/25  1:23 PM    Specimen: Blood   Result Value Ref Range    TSH 18.000 (H) 0.270 - 4.200 uIU/mL   T4, Free    Collection Time: 04/06/25  1:23 PM    Specimen: Blood   Result Value Ref Range    Free T4 1.02 0.92 - 1.68 ng/dL   CBC Auto Differential    Collection Time: 04/06/25  1:23 PM    Specimen: Blood   Result Value Ref Range    WBC 9.94 3.40 - 10.80 10*3/mm3    RBC 4.05 3.77 - 5.28 10*6/mm3    Hemoglobin 11.7 (L) 12.0 - 15.9 g/dL    Hematocrit 37.4 34.0 - 46.6 %    MCV 92.3 79.0 - 97.0 fL    MCH 28.9 26.6 - 33.0 pg    MCHC 31.3 (L) 31.5 - 35.7 g/dL    RDW 14.5 12.3 - 15.4 %    RDW-SD 48.0 37.0 - 54.0 fl    MPV 11.6 6.0 - 12.0 fL    Platelets 321 140 - 450 10*3/mm3    Neutrophil % 76.8 (H) 42.7 - 76.0 %    Lymphocyte % 15.0 (L) 19.6 - 45.3 %    Monocyte % 5.4 5.0 - 12.0 %    Eosinophil % 2.1 0.3 - 6.2 %    Basophil % 0.3 0.0 - 1.5 %     Immature Grans % 0.4 0.0 - 0.5 %    Neutrophils, Absolute 7.63 (H) 1.70 - 7.00 10*3/mm3    Lymphocytes, Absolute 1.49 0.70 - 3.10 10*3/mm3    Monocytes, Absolute 0.54 0.10 - 0.90 10*3/mm3    Eosinophils, Absolute 0.21 0.00 - 0.40 10*3/mm3    Basophils, Absolute 0.03 0.00 - 0.20 10*3/mm3    Immature Grans, Absolute 0.04 0.00 - 0.05 10*3/mm3    nRBC 0.0 0.0 - 0.2 /100 WBC   ECG 12 Lead Other; BLE edema    Collection Time: 04/06/25  1:28 PM   Result Value Ref Range    QT Interval 469 ms    QTC Interval 444 ms   High Sensitivity Troponin T 1Hr    Collection Time: 04/06/25  2:30 PM    Specimen: Blood   Result Value Ref Range    HS Troponin T 76 (C) <14 ng/L    Troponin T Numeric Delta -4 ng/L    Troponin T % Delta -5 Abnormal if >/= 20%         RADIOLOGY  XR Chest 1 View  Result Date: 4/6/2025  XR CHEST 1 VW-  HISTORY: Female who is 69 years-old, bilateral lower extremity swelling  TECHNIQUE: Frontal views of the chest  COMPARISON: 2/4/2025  FINDINGS: The heart size is borderline. Pulmonary vasculature is unremarkable. Small likely atelectasis at the right base. No pleural effusion, or pneumothorax. No acute osseous process.      Small likely atelectasis at the right base. Borderline heart size. Follow-up as clinical indications persist.  This report was finalized on 4/6/2025 1:40 PM by Dr. Maldonado Fuentes M.D on Workstation: [id number]          MEDICATIONS GIVEN IN ER  Medications   sodium chloride 0.9 % flush 10 mL (has no administration in time range)   furosemide (LASIX) injection 80 mg (has no administration in time range)         ORDERS PLACED DURING THIS VISIT:  Orders Placed This Encounter   Procedures    XR Chest 1 View    Comprehensive Metabolic Panel    BNP    High Sensitivity Troponin T    TSH    T4, Free    CBC Auto Differential    High Sensitivity Troponin T 1Hr    Monitor Blood Pressure    Continuous Pulse Oximetry    LHA (on-call MD unless specified) Details    ECG 12 Lead Other; BLE edema    Insert  Peripheral IV    Inpatient Admission    CBC & Differential         DIFFERENTIAL DIAGNOSIS:  Differential diagnosis include but are not limited to the following: Peripheral edema, CHF, venous stasis, peripheral vascular disease, PAD      OUTPATIENT MEDICATION MANAGEMENT:  Current Facility-Administered Medications Ordered in Epic   Medication Dose Route Frequency Provider Last Rate Last Admin    furosemide (LASIX) injection 80 mg  80 mg Intravenous Once Joyce Evans APRN        sodium chloride 0.9 % flush 10 mL  10 mL Intravenous PRN Joyce Evans APRN         Current Outpatient Medications Ordered in Epic   Medication Sig Dispense Refill    acetaminophen (TYLENOL) 325 MG tablet Take 2 tablets by mouth Every 6 (Six) Hours As Needed for Mild Pain.      aspirin 81 MG EC tablet Take 1 tablet by mouth Daily. 90 tablet 0    atorvastatin (LIPITOR) 20 MG tablet Take 1 tablet by mouth Daily. 90 tablet 1    bisacodyl (DULCOLAX) 10 MG suppository Insert 1 suppository into the rectum Daily As Needed for Constipation (Use if bisacodyl oral is ineffective). (Patient not taking: Reported on 3/27/2025)      bisacodyl (DULCOLAX) 5 MG EC tablet Take 1 tablet by mouth Daily As Needed for Constipation (Use if polyethylene glycol is ineffective). (Patient not taking: Reported on 3/27/2025)      Emollient (Eucerin original healing lotion) lotion Apply 1 each topically to the appropriate area as directed Daily. Apply to both legs daily, available over-the-counter (Patient not taking: Reported on 3/27/2025)      levothyroxine (SYNTHROID, LEVOTHROID) 75 MCG tablet Take 1 tablet by mouth Every Morning. 30 tablet 1    metoprolol succinate XL (TOPROL-XL) 25 MG 24 hr tablet Take 1 tablet by mouth Daily. 90 tablet 1    oxybutynin XL (DITROPAN-XL) 5 MG 24 hr tablet Take 1 tablet by mouth Daily. 90 tablet 1    polyethylene glycol (MIRALAX) 17 g packet Take 17 g by mouth 2 (Two) Times a Day As Needed (As needed for  constipation).      sennosides-docusate (PERICOLACE) 8.6-50 MG per tablet Take 2 tablets by mouth 2 (Two) Times a Day. (Patient not taking: Reported on 3/27/2025)      torsemide (DEMADEX) 20 MG tablet Take 1 tablet by mouth Daily. 30 tablet 0         PROCEDURES  Procedures        PROGRESS, DATA ANALYSIS, CONSULTS, AND MEDICAL DECISION MAKING  All labs have been independently interpreted by me.  All radiology studies have been reviewed by me. All EKG's have been independently viewed and interpreted by me.  Discussion below represents my analysis of pertinent findings related to patient's condition, differential diagnosis, treatment plan and final disposition        Clinical Scores:                  ED Course as of 04/06/25 1507   Sun Apr 06, 2025   1336 69-year-old female who presents with bilateral lower extremity edema for the past 2 weeks.  She is compliant with her diuretic and Synthroid.  Plan for labs, chest x-ray. [MS]   1352 WBC: 9.94 [MS]   1353 Hemoglobin(!): 11.7 [MS]   1353 Hematocrit: 37.4 [MS]   1353 Radiology study chest xray independently interpreted by me and my findings are no infiltrate or pleural effusion.  See dictation for official radiology interpretation.   [MS]   1415 TSH Baseline(!): 18.000  10 days ago TSH was 9.1. BUN & Creat were 25 & 1.36. [MS]   1416 BUN(!): 43 [MS]   1416 Creatinine(!): 1.42 [MS]   1416 proBNP(!): 3,390.0 [MS]   1450 Reviewed pt's history and workup with Dr. Ortiz.  After a bedside evaluation, he agrees with the plan of care.     [MS]   1504 Consult Note    Discussed care with Dr Ponce  Reviewed patient's history, exam, results and need for admission secondary to hypothyroidism, JUSTIN, peripheral edema  Dr. Ponce accepts the patient to be admitted to telemetry inpatient bed.     [MS]      ED Course User Index  [MS] Joyce Evans, APRN             AS OF 15:07 EDT VITALS:    BP - 158/71  HR - 60  TEMP - 96.7 °F (35.9 °C) (Tympanic)  O2 SATS -  100%    COMPLEXITY OF CARE  The patient requires admission.      DIAGNOSIS  Final diagnoses:   Peripheral edema   Hypothyroidism, unspecified type   JUSTIN (acute kidney injury)         DISPOSITION  ED Disposition       ED Disposition   Decision to Admit    Condition   --    Comment   Level of Care: Telemetry [5]   Diagnosis: Hypothyroidism [063760]   Admitting Physician: SMITH PIERRE [[id number]]   Attending Physician: SMITH PIERRE [[id number]]   Certification: I Certify That Inpatient Hospital Services Are Medically Necessary For Greater Than 2 Midnights                  Please note that portions of this document were completed with a voice recognition program.    Note Disclaimer: At [facility], we believe that sharing information builds trust and better relationships. You are receiving this note because you recently visited [facility]. It is possible you will see health information before a provider has talked with you about it. This kind of information can be easy to misunderstand. To help you fully understand what it means for your health, we urge you to discuss this note with your provider.         Joyce Evans, APRN  04/06/25 1509

## 2025-04-06 NOTE — ED PROVIDER NOTES
EMERGENCY DEPARTMENT MD ATTESTATION NOTE    Room Number:  E559/1  PCP: Bing Saavedra APRN  Independent Historians: Patient and Family    HPI:  A complete HPI/ROS/PMH/PSH/SH/FH are unobtainable due to: None    Chronic or social conditions impacting patient care (Social Determinants of Health): None      Context: Rika Millan is a 69 y.o. female with a medical history of hyperlipidemia and hypothyroidism who presents to the ED c/o acute increasing leg swelling and pain in the bilateral lower legs.  Patient has been taking her medications including Synthroid and torsemide as directed.  However over the past 2 weeks her legs have increased in the amount of swelling and she is having some weeping of fluids and it is more difficult for her to walk or stand because of the increased pain and heaviness in her legs.  She denies fevers.  Denies chest pain.  Denies difficulties breathing.      Review of prior external notes (non-ED) -and- Review of prior external test results outside of this encounter: I independently reviewed the hospitalist discharge summary from February 11, 2025.  At that time, patient was admitted for management of severe hypothyroidism with myxedema and JUSTIN.  She was discharged on oral Synthroid therapy.  Nephrology was consulted for assistance with managing the JUSTIN.  New baseline creatinine was estimated at 1.3.    Prescription drug monitoring program review: [facility] reviewed by Beba Ponce MD, Johny Ortiz MD         PHYSICAL EXAM    I have reviewed the triage vital signs and nursing notes.    ED Triage Vitals   Temp Heart Rate Resp BP SpO2   04/06/25 1247 04/06/25 1247 04/06/25 1247 04/06/25 1252 04/06/25 1247   96.7 °F (35.9 °C) 60 18 147/87 94 %      Temp src Heart Rate Source Patient Position BP Location FiO2 (%)   04/06/25 1247 04/06/25 1247 04/06/25 1252 04/06/25 1252 --   Tympanic Monitor Sitting Right arm        Physical Exam  GENERAL: alert, no acute distress  SKIN:  Warm, dry  HENT: Normocephalic, atraumatic  EYES: no scleral icterus, EOMI  CV: regular rhythm, regular rate, normal perfusion  RESPIRATORY: normal effort, lungs clear  ABDOMEN: soft, nondistended, nontender  MUSCULOSKELETAL: Bilateral lower legs and feet and ankles are markedly edematous.  Soft tissues are mildly indurated.  Both feet are warm and well-perfused.  Sensation is intact.  NEURO: alert, moves all extremities, follows commands      MEDICATIONS GIVEN IN ER  Medications   sodium chloride 0.9 % flush 10 mL (has no administration in time range)   mupirocin (BACTROBAN) 2 % nasal ointment 1 Application (has no administration in time range)   acetaminophen (TYLENOL) tablet 650 mg (has no administration in time range)     Or   acetaminophen (TYLENOL) 160 MG/5ML oral solution 650 mg (has no administration in time range)     Or   acetaminophen (TYLENOL) suppository 650 mg (has no administration in time range)   ondansetron ODT (ZOFRAN-ODT) disintegrating tablet 4 mg (has no administration in time range)     Or   ondansetron (ZOFRAN) injection 4 mg (has no administration in time range)   melatonin tablet 2.5 mg (has no administration in time range)   sennosides-docusate (PERICOLACE) 8.6-50 MG per tablet 2 tablet (has no administration in time range)     And   polyethylene glycol (MIRALAX) packet 17 g (has no administration in time range)     And   bisacodyl (DULCOLAX) EC tablet 5 mg (has no administration in time range)     And   bisacodyl (DULCOLAX) suppository 10 mg (has no administration in time range)   furosemide (LASIX) injection 80 mg (80 mg Intravenous Given 4/6/25 9234)         ORDERS PLACED DURING THIS VISIT:  Orders Placed This Encounter   Procedures    XR Chest 1 View    Comprehensive Metabolic Panel    BNP    High Sensitivity Troponin T    TSH    T4, Free    CBC Auto Differential    High Sensitivity Troponin T 1Hr    Basic Metabolic Panel    CBC (No Diff)    Diet: Cardiac; Healthy Heart (2-3 Na+);  Fluid Consistency: Thin (IDDSI 0)    Monitor Blood Pressure    Continuous Pulse Oximetry    Vital Signs    Up with assistance    Daily Weights    Strict Intake & Output    Oral Care    Daily CHG Bath While in ICU    Place Sequential Compression Device    Maintain Sequential Compression Device    Code Status and Medical Interventions: CPR (Attempt to Resuscitate); Full    LHA (on-call MD unless specified) Details    ECG 12 Lead Other; BLE edema    Insert Peripheral IV    Inpatient Admission    CBC & Differential         PROCEDURES  Procedures        PROGRESS, DATA ANALYSIS, CONSULTS, AND MEDICAL DECISION MAKING  All labs have been independently interpreted by me.  All radiology studies have been reviewed by me. All EKG's have been independently viewed and interpreted by me.  Discussion below represents my analysis of pertinent findings related to patient's condition, differential diagnosis, treatment plan and final disposition.    Differential diagnosis includes but is not limited to cellulitis, DVT, peripheral edema, myxedema, erythema nodosum.    Clinical Scores:                 MDM:  ED Course as of 04/06/25 1628   Sun Apr 06, 2025   1336 69-year-old female who presents with bilateral lower extremity edema for the past 2 weeks.  She is compliant with her diuretic and Synthroid.  Plan for labs, chest x-ray. [MS]   1352 WBC: 9.94 [MS]   1353 Hemoglobin(!): 11.7 [MS]   1353 Hematocrit: 37.4 [MS]   1353 Radiology study chest xray independently interpreted by me and my findings are no infiltrate or pleural effusion.  See dictation for official radiology interpretation.   [MS]   1415 TSH Baseline(!): 18.000  10 days ago TSH was 9.1. BUN & Creat were 25 & 1.36. [MS]   1416 BUN(!): 43 [MS]   1416 Creatinine(!): 1.42 [MS]   1416 proBNP(!): 3,390.0 [MS]   1450 Reviewed pt's history and workup with Dr. Ortiz.  After a bedside evaluation, he agrees with the plan of care.     [MS]   1504 Consult Note    Discussed care with   Rosario  Reviewed patient's history, exam, results and need for admission secondary to hypothyroidism, JUSTIN, peripheral edema  Dr. Ponce accepts the patient to be admitted to telemetry inpatient bed.     [MS]      ED Course User Index  [MS] TeresaJoyce greene, ROBIN       I independently interpreted the Chest X-ray and my findings are: No Pneumothorax, No Effusion, No Infiltrate    EKG         EKG time/Interp time: 1328/1340  Rhythm/Rate: Sinus rhythm, 56 bpm, PACs  P waves and NE: Present, short NE interval  QRS, axis: 88 ms, narrow complex, normal axis  ST and T waves: No acute ischemic changes  Independently interpreted by me contemporaneously with treatment    Physical exam shows marked edema.  The TSH has increased once again and is now at 18.  I think that she is having recurrence of myxedema problems.  This is in spite of taking her home dose Synthroid.  We will plan to admit her for further management and diuresis needs as well as adjustment of her thyroid supplement medication as appropriate.  She is agreeable with that plan.  Paging A for further guidance with medical management.        COMPLEXITY OF CARE  The patient requires admission.    Please note that portions of this document were completed with a voice recognition program.    Note Disclaimer: At [facility], we believe that sharing information builds trust and better relationships. You are receiving this note because you recently visited [facility]. It is possible you will see health information before a provider has talked with you about it. This kind of information can be easy to misunderstand. To help you fully understand what it means for your health, we urge you to discuss this note with your provider.       Johny Ortiz MD  04/06/25 4031

## 2025-04-06 NOTE — ED NOTES
Nursing report ED to floor  Rika Millan  69 y.o.  female    HPI :  HPI  Stated Reason for Visit: leg swelling  History Obtained From: patient    Chief Complaint  Chief Complaint   Patient presents with    Leg Swelling       Admitting doctor:   Beba Ponce MD    Admitting diagnosis:   The primary encounter diagnosis was Peripheral edema. Diagnoses of Hypothyroidism, unspecified type and JUSTIN (acute kidney injury) were also pertinent to this visit.    Code status:   Current Code Status       Date Active Code Status Order ID Comments User Context       Prior            Allergies:   Codeine, Morphine, and Percocet [oxycodone-acetaminophen]    Isolation:   No active isolations    Intake and Output  No intake or output data in the 24 hours ending 04/06/25 1508    Weight:       04/06/25  1247   Weight: 95.3 kg (210 lb)       Most recent vitals:   Vitals:    04/06/25 1252 04/06/25 1300 04/06/25 1326 04/06/25 1441   BP: 147/87 134/73 158/71 117/68   BP Location: Right arm      Patient Position: Sitting      Pulse:   60 55   Resp:       Temp:       TempSrc:       SpO2:   100% 100%   Weight:       Height:           Active LDAs/IV Access:   Lines, Drains & Airways       Active LDAs       Name Placement date Placement time Site Days    Peripheral IV 04/06/25 1321 Right Antecubital 04/06/25  1321  Antecubital  less than 1                    Labs (abnormal labs have a star):   Labs Reviewed   COMPREHENSIVE METABOLIC PANEL - Abnormal; Notable for the following components:       Result Value    Glucose 113 (*)     BUN 43 (*)     Creatinine 1.42 (*)     Potassium 3.1 (*)     Alkaline Phosphatase 126 (*)     BUN/Creatinine Ratio 30.3 (*)     eGFR 39.9 (*)     All other components within normal limits    Narrative:     GFR Categories in Chronic Kidney Disease (CKD)      GFR Category          GFR (mL/min/1.73)    Interpretation  G1                     90 or greater         Normal or high (1)  G2                      60-89                 Mild decrease (1)  G3a                   45-59                Mild to moderate decrease  G3b                   30-44                Moderate to severe decrease  G4                    15-29                Severe decrease  G5                    14 or less           Kidney failure          (1)In the absence of evidence of kidney disease, neither GFR category G1 or G2 fulfill the criteria for CKD.    eGFR calculation 2021 CKD-EPI creatinine equation, which does not include race as a factor   BNP (IN-HOUSE) - Abnormal; Notable for the following components:    proBNP 3,390.0 (*)     All other components within normal limits    Narrative:     This assay is used as an aid in the diagnosis of individuals suspected of having heart failure. It can be used as an aid in the diagnosis of acute decompensated heart failure (ADHF) in patients presenting with signs and symptoms of ADHF to the emergency department (ED). In addition, NT-proBNP of <300 pg/mL indicates ADHF is not likely.    Age Range Result Interpretation  NT-proBNP Concentration (pg/mL:      <50             Positive            >450                   Gray                 300-450                    Negative             <300    50-75           Positive            >900                  Gray                300-900                  Negative            <300      >75             Positive            >1800                  Gray                300-1800                  Negative            <300   TROPONIN - Abnormal; Notable for the following components:    HS Troponin T 80 (*)     All other components within normal limits    Narrative:     High Sensitive Troponin T Reference Range:  <14.0 ng/L- Negative Female for AMI  <22.0 ng/L- Negative Male for AMI  >=14 - Abnormal Female indicating possible myocardial injury.  >=22 - Abnormal Male indicating possible myocardial injury.   Clinicians would have to utilize clinical acumen, EKG, Troponin, and serial changes to  determine if it is an Acute Myocardial Infarction or myocardial injury due to an underlying chronic condition.        TSH - Abnormal; Notable for the following components:    TSH 18.000 (*)     All other components within normal limits   CBC WITH AUTO DIFFERENTIAL - Abnormal; Notable for the following components:    Hemoglobin 11.7 (*)     MCHC 31.3 (*)     Neutrophil % 76.8 (*)     Lymphocyte % 15.0 (*)     Neutrophils, Absolute 7.63 (*)     All other components within normal limits   HIGH SENSITIVITIY TROPONIN T 1HR - Abnormal; Notable for the following components:    HS Troponin T 76 (*)     All other components within normal limits    Narrative:     High Sensitive Troponin T Reference Range:  <14.0 ng/L- Negative Female for AMI  <22.0 ng/L- Negative Male for AMI  >=14 - Abnormal Female indicating possible myocardial injury.  >=22 - Abnormal Male indicating possible myocardial injury.   Clinicians would have to utilize clinical acumen, EKG, Troponin, and serial changes to determine if it is an Acute Myocardial Infarction or myocardial injury due to an underlying chronic condition.        T4, FREE - Normal   CBC AND DIFFERENTIAL    Narrative:     The following orders were created for panel order CBC & Differential.  Procedure                               Abnormality         Status                     ---------                               -----------         ------                     CBC Auto Differential[[id number]]        Abnormal            Final result                 Please view results for these tests on the individual orders.       EKG:   ECG 12 Lead Other; BLE edema   Preliminary Result   HEART RATE=56  bpm   RR Qoqexvie=2101  ms   WI Interval=77  ms   P Horizontal Axis=  deg   P Front Axis=59  deg   QRSD Interval=88  ms   QT Prnyrzma=190  ms   VGhK=427  ms   QRS Axis=40  deg   T Wave Axis=240  deg   - BORDERLINE ECG -   Sinus rhythm   Atrial premature complex   Short WI interval   Borderline  repolarization abnormality   Date and Time of Study:2025 13:28:40          Meds given in ED:   Medications   sodium chloride 0.9 % flush 10 mL (has no administration in time range)   furosemide (LASIX) injection 80 mg (has no administration in time range)       Imaging results:  XR Chest 1 View  Result Date: 2025  Small likely atelectasis at the right base. Borderline heart size. Follow-up as clinical indications persist.  This report was finalized on 2025 1:40 PM by Dr. Maldonado Fuentes M.D on Workstation: [employer]        Ambulatory status:   - wheelchair    Social issues:   Social History     Socioeconomic History    Marital status:    Tobacco Use    Smoking status: Former     Current packs/day: 0.00     Average packs/day: 0.5 packs/day for 40.0 years (20.8 ttl pk-yrs)     Types: Cigarettes     Start date:      Quit date:      Years since quittin.2    Smokeless tobacco: Never    Tobacco comments:     4-5 sticks per day   Vaping Use    Vaping status: Never Used   Substance and Sexual Activity    Alcohol use: Not Currently    Drug use: Never    Sexual activity: Defer       Peripheral Neurovascular  Peripheral Neurovascular (Adult)  Peripheral Neurovascular WDL: .WDL except  Edema  Edema: leg, left, leg, right  Leg, Left Edema: 4+ (Severe)  Leg, Right Edema: 4+ (Severe)    Neuro Cognitive  Neuro Cognitive (Adult)  Cognitive/Neuro/Behavioral WDL: WDL, all  Level of Consciousness: Alert  Arousal Level: opens eyes spontaneously  Orientation: oriented x 4  Speech: spontaneous, clear, logical  Mood/Behavior: behavior appropriate to situation, calm, cooperative    Learning  Learning Assessment  Learning Readiness and Ability: no barriers identified    Respiratory  Respiratory WDL  Respiratory WDL: WDL    Abdominal Pain       Pain Assessments  Pain (Adult)  (0-10) Pain Rating: Rest: 4  Pain Location: extremity  Pain Side/Orientation: lower, bilateral    NIH Stroke Scale       [place]  STEVEN Hendricks  04/06/25 15:08 EDT

## 2025-04-07 LAB
ANION GAP SERPL CALCULATED.3IONS-SCNC: 13.5 MMOL/L (ref 5–15)
BUN SERPL-MCNC: 36 MG/DL (ref 8–23)
BUN/CREAT SERPL: 25.9 (ref 7–25)
CALCIUM SPEC-SCNC: 8.8 MG/DL (ref 8.6–10.5)
CHLORIDE SERPL-SCNC: 99 MMOL/L (ref 98–107)
CO2 SERPL-SCNC: 28.5 MMOL/L (ref 22–29)
CREAT SERPL-MCNC: 1.39 MG/DL (ref 0.57–1)
DEPRECATED RDW RBC AUTO: 48.7 FL (ref 37–54)
EGFRCR SERPLBLD CKD-EPI 2021: 40.9 ML/MIN/1.73
ERYTHROCYTE [DISTWIDTH] IN BLOOD BY AUTOMATED COUNT: 14.3 % (ref 12.3–15.4)
GLUCOSE SERPL-MCNC: 126 MG/DL (ref 65–99)
HCT VFR BLD AUTO: 35.7 % (ref 34–46.6)
HGB BLD-MCNC: 11.4 G/DL (ref 12–15.9)
MAGNESIUM SERPL-MCNC: 2.1 MG/DL (ref 1.6–2.4)
MCH RBC QN AUTO: 29.6 PG (ref 26.6–33)
MCHC RBC AUTO-ENTMCNC: 31.9 G/DL (ref 31.5–35.7)
MCV RBC AUTO: 92.7 FL (ref 79–97)
PLATELET # BLD AUTO: 305 10*3/MM3 (ref 140–450)
PMV BLD AUTO: 11.8 FL (ref 6–12)
POTASSIUM SERPL-SCNC: 3.2 MMOL/L (ref 3.5–5.2)
POTASSIUM SERPL-SCNC: 3.8 MMOL/L (ref 3.5–5.2)
RBC # BLD AUTO: 3.85 10*6/MM3 (ref 3.77–5.28)
SODIUM SERPL-SCNC: 141 MMOL/L (ref 136–145)
T3FREE SERPL-MCNC: 1.62 PG/ML (ref 2–4.4)
WBC NRBC COR # BLD AUTO: 10.33 10*3/MM3 (ref 3.4–10.8)

## 2025-04-07 PROCEDURE — 80048 BASIC METABOLIC PNL TOTAL CA: CPT | Performed by: INTERNAL MEDICINE

## 2025-04-07 PROCEDURE — 25010000002 FUROSEMIDE PER 20 MG: Performed by: INTERNAL MEDICINE

## 2025-04-07 PROCEDURE — 25010000002 HEPARIN (PORCINE) PER 1000 UNITS: Performed by: STUDENT IN AN ORGANIZED HEALTH CARE EDUCATION/TRAINING PROGRAM

## 2025-04-07 PROCEDURE — 84481 FREE ASSAY (FT-3): CPT | Performed by: STUDENT IN AN ORGANIZED HEALTH CARE EDUCATION/TRAINING PROGRAM

## 2025-04-07 PROCEDURE — 83735 ASSAY OF MAGNESIUM: CPT | Performed by: STUDENT IN AN ORGANIZED HEALTH CARE EDUCATION/TRAINING PROGRAM

## 2025-04-07 PROCEDURE — 84132 ASSAY OF SERUM POTASSIUM: CPT | Performed by: STUDENT IN AN ORGANIZED HEALTH CARE EDUCATION/TRAINING PROGRAM

## 2025-04-07 PROCEDURE — 85027 COMPLETE CBC AUTOMATED: CPT | Performed by: INTERNAL MEDICINE

## 2025-04-07 PROCEDURE — 87481 CANDIDA DNA AMP PROBE: CPT | Performed by: STUDENT IN AN ORGANIZED HEALTH CARE EDUCATION/TRAINING PROGRAM

## 2025-04-07 RX ORDER — LEVOTHYROXINE SODIUM 88 UG/1
88 TABLET ORAL
Status: DISCONTINUED | OUTPATIENT
Start: 2025-04-08 | End: 2025-04-07

## 2025-04-07 RX ORDER — POTASSIUM CHLORIDE 1500 MG/1
40 TABLET, EXTENDED RELEASE ORAL EVERY 4 HOURS
Status: COMPLETED | OUTPATIENT
Start: 2025-04-07 | End: 2025-04-07

## 2025-04-07 RX ORDER — NYSTATIN 100000 [USP'U]/G
POWDER TOPICAL EVERY 12 HOURS SCHEDULED
Status: DISCONTINUED | OUTPATIENT
Start: 2025-04-07 | End: 2025-04-09 | Stop reason: HOSPADM

## 2025-04-07 RX ORDER — FUROSEMIDE 10 MG/ML
40 INJECTION INTRAMUSCULAR; INTRAVENOUS EVERY 12 HOURS
Status: DISCONTINUED | OUTPATIENT
Start: 2025-04-07 | End: 2025-04-08

## 2025-04-07 RX ORDER — LEVOTHYROXINE SODIUM 25 UG/1
25 TABLET ORAL ONCE
Status: COMPLETED | OUTPATIENT
Start: 2025-04-07 | End: 2025-04-07

## 2025-04-07 RX ORDER — SPIRONOLACTONE 25 MG/1
25 TABLET ORAL DAILY
Status: DISCONTINUED | OUTPATIENT
Start: 2025-04-07 | End: 2025-04-09 | Stop reason: HOSPADM

## 2025-04-07 RX ORDER — HEPARIN SODIUM 5000 [USP'U]/ML
5000 INJECTION, SOLUTION INTRAVENOUS; SUBCUTANEOUS EVERY 8 HOURS SCHEDULED
Status: DISCONTINUED | OUTPATIENT
Start: 2025-04-07 | End: 2025-04-09 | Stop reason: HOSPADM

## 2025-04-07 RX ORDER — OXYBUTYNIN CHLORIDE 10 MG/1
10 TABLET, EXTENDED RELEASE ORAL DAILY
Status: DISCONTINUED | OUTPATIENT
Start: 2025-04-08 | End: 2025-04-09 | Stop reason: HOSPADM

## 2025-04-07 RX ORDER — LEVOTHYROXINE SODIUM 100 UG/1
100 TABLET ORAL
Status: DISCONTINUED | OUTPATIENT
Start: 2025-04-08 | End: 2025-04-09 | Stop reason: HOSPADM

## 2025-04-07 RX ADMIN — SPIRONOLACTONE 25 MG: 25 TABLET, FILM COATED ORAL at 08:33

## 2025-04-07 RX ADMIN — FUROSEMIDE 40 MG: 10 INJECTION, SOLUTION INTRAMUSCULAR; INTRAVENOUS at 17:55

## 2025-04-07 RX ADMIN — OXYBUTYNIN CHLORIDE 5 MG: 5 TABLET, EXTENDED RELEASE ORAL at 08:33

## 2025-04-07 RX ADMIN — POTASSIUM CHLORIDE 40 MEQ: 1500 TABLET, EXTENDED RELEASE ORAL at 13:08

## 2025-04-07 RX ADMIN — HEPARIN SODIUM 5000 UNITS: 5000 INJECTION INTRAVENOUS; SUBCUTANEOUS at 13:08

## 2025-04-07 RX ADMIN — METOPROLOL SUCCINATE 25 MG: 25 TABLET, EXTENDED RELEASE ORAL at 08:33

## 2025-04-07 RX ADMIN — FUROSEMIDE 40 MG: 10 INJECTION, SOLUTION INTRAMUSCULAR; INTRAVENOUS at 04:22

## 2025-04-07 RX ADMIN — LEVOTHYROXINE SODIUM 75 MCG: 0.07 TABLET ORAL at 05:43

## 2025-04-07 RX ADMIN — NYSTATIN: 100000 POWDER TOPICAL at 20:52

## 2025-04-07 RX ADMIN — POTASSIUM CHLORIDE 40 MEQ: 1500 TABLET, EXTENDED RELEASE ORAL at 17:55

## 2025-04-07 RX ADMIN — MUPIROCIN 1 APPLICATION: 20 OINTMENT TOPICAL at 08:33

## 2025-04-07 RX ADMIN — ATORVASTATIN CALCIUM 20 MG: 20 TABLET, FILM COATED ORAL at 08:33

## 2025-04-07 RX ADMIN — ASPIRIN 81 MG: 81 TABLET, COATED ORAL at 08:33

## 2025-04-07 RX ADMIN — HEPARIN SODIUM 5000 UNITS: 5000 INJECTION INTRAVENOUS; SUBCUTANEOUS at 22:01

## 2025-04-07 RX ADMIN — MUPIROCIN 1 APPLICATION: 20 OINTMENT TOPICAL at 22:01

## 2025-04-07 RX ADMIN — POTASSIUM CHLORIDE 40 MEQ: 1500 TABLET, EXTENDED RELEASE ORAL at 08:33

## 2025-04-07 RX ADMIN — LEVOTHYROXINE SODIUM 25 MCG: 0.03 TABLET ORAL at 08:33

## 2025-04-07 NOTE — PLAN OF CARE
Goal Outcome Evaluation:  Plan of Care Reviewed With: patient        Progress: improving  Outcome Evaluation: Pt A&Ox4, no c/o pain, NSR/SB on monitor. 4+ BLE swelling, warmth, and redness. IV Lasix per order. Nephrology consulted. Plan of care ongoing.

## 2025-04-07 NOTE — CONSULTS
[facility] CONS[facility]      Patient Identification:  NAME:  Rika Millan  Age:  69 y.o.   Sex:  female   :  1955   MRN:  [id number]     Chief complaint: Urinary incontinence    History of present illness:      69-year-old female history of hypothyroidism, hypertension and chronic renal insufficiency was admitted with fluid overload.  [facility] tomorrow to evaluate ongoing urge incontinence.  She states she has severe urge incontinence with no control.  No hematuria or dysuria no infections.  She is a little confused on the medication.  It is listed as oxybutynin but she is not sure how long she has been on that.      In hospital:  -AVSS, good UOP  -WBC -10.33  -Creat -1.39  Asked to see    Past medical history:  Past Medical History:   Diagnosis Date    Cancer     skin cancer    Disease of thyroid gland     Hypertension        Past surgical history:  Past Surgical History:   Procedure Laterality Date    CHOLECYSTECTOMY      ORIF ANKLE FRACTURE Right     more than 10years ago       Allergies:  Codeine, Morphine, and Percocet [oxycodone-acetaminophen]    Home medications:  Medications Prior to Admission   Medication Sig Dispense Refill Last Dose/Taking    aspirin 81 MG EC tablet Take 1 tablet by mouth Daily. 90 tablet 0 2025    atorvastatin (LIPITOR) 20 MG tablet Take 1 tablet by mouth Daily. 90 tablet 1 2025 Bedtime    levothyroxine (SYNTHROID, LEVOTHROID) 75 MCG tablet Take 1 tablet by mouth Every Morning. 30 tablet 1 2025 Morning    metoprolol succinate XL (TOPROL-XL) 25 MG 24 hr tablet Take 1 tablet by mouth Daily. 90 tablet 1 2025 Morning    oxybutynin XL (DITROPAN-XL) 5 MG 24 hr tablet Take 1 tablet by mouth Daily. 90 tablet 1 2025 Morning    torsemide (DEMADEX) 20 MG tablet Take 1 tablet by mouth Daily. 30 tablet 0 2025 Morning    acetaminophen (TYLENOL) 325 MG tablet Take 2 tablets by mouth Every 6 (Six) Hours As Needed for Mild Pain.        bisacodyl (DULCOLAX) 10 MG suppository Insert 1 suppository into the rectum Daily As Needed for Constipation (Use if bisacodyl oral is ineffective). (Patient not taking: Reported on 3/27/2025)       bisacodyl (DULCOLAX) 5 MG EC tablet Take 1 tablet by mouth Daily As Needed for Constipation (Use if polyethylene glycol is ineffective). (Patient not taking: Reported on 3/27/2025)       Emollient (Eucerin original healing lotion) lotion Apply 1 each topically to the appropriate area as directed Daily. Apply to both legs daily, available over-the-counter (Patient not taking: Reported on 3/27/2025)       polyethylene glycol (MIRALAX) 17 g packet Take 17 g by mouth 2 (Two) Times a Day As Needed (As needed for constipation).       sennosides-docusate (PERICOLACE) 8.6-50 MG per tablet Take 2 tablets by mouth 2 (Two) Times a Day. (Patient not taking: Reported on 3/27/2025)           Hospital medications:  aspirin, 81 mg, Oral, Daily  atorvastatin, 20 mg, Oral, Daily  [Held by provider] furosemide, 40 mg, Intravenous, Q12H  furosemide, 40 mg, Intravenous, Q12H  heparin (porcine), 5,000 Units, Subcutaneous, Q8H  hydrocortisone-bacitracin-zinc oxide-nystatin, 1 Application, Topical, BID  [START ON 4/8/2025] levothyroxine, 100 mcg, Oral, Q AM  metoprolol succinate XL, 25 mg, Oral, Q24H  mupirocin, 1 Application, Each Nare, BID  nystatin, , Topical, Q12H  oxybutynin XL, 5 mg, Oral, Daily  potassium chloride, 40 mEq, Oral, Q4H  spironolactone, 25 mg, Oral, Daily           acetaminophen **OR** acetaminophen **OR** acetaminophen    senna-docusate sodium **AND** polyethylene glycol **AND** bisacodyl **AND** bisacodyl    melatonin    ondansetron ODT **OR** ondansetron    [COMPLETED] Insert Peripheral IV **AND** sodium chloride    Family history:  Family History   Problem Relation Age of Onset    No Known Problems Mother     No Known Problems Father        Social history:  Social History     Tobacco Use    Smoking status: Former      Current packs/day: 0.00     Average packs/day: 0.5 packs/day for 40.0 years (20.8 ttl pk-yrs)     Types: Cigarettes     Start date:      Quit date:      Years since quittin.2    Smokeless tobacco: Never    Tobacco comments:     4-5 sticks per day   Vaping Use    Vaping status: Never Used   Substance Use Topics    Alcohol use: Not Currently    Drug use: Never       Review of systems:      Positive for: Urinary continence.  Negative for:  chest pain, cough, sob, o/w neg    Objective:  TMax 24 hours:   Temp (24hrs), Av.2 °F (36.8 °C), Min:97.5 °F (36.4 °C), Max:98.7 °F (37.1 °C)      Vitals Ranges:   Temp:  [97.5 °F (36.4 °C)-98.7 °F (37.1 °C)] (P) 98.3 °F (36.8 °C)  Heart Rate:  [63-72] 72  Resp:  [16-18] (P) 16  BP: (109-159)/(61-73) 123/73    Intake/Output Last 3 shifts:  I/O last 3 completed shifts:  In: 360 [P.O.:360]  Out: 1600 [Urine:1600]     Physical Exam:    General Appearance:    Alert, cooperative, NAD   Back:     No CVA tenderness   Lungs:     Respirations unlabored, no wheezing    Heart:    RRR, intact peripheral pulses   Abdomen:     Soft, NDNT, no masses, no guarding   Neuro/Psych:   Orientation intact, mood/affect pleasant       Results review:   I reviewed the patient's new clinical results.    Data review:  Lab Results (last 24 hours)       Procedure Component Value Units Date/Time    Potassium [[id number]] Collected: 25 1638    Specimen: Blood Updated: 25 1651    CANDIDA AURIS PCR - Swab, Axilla Right, Axilla Left and Groin [[id number]] Collected: 25 1433    Specimen: Swab from Axilla Right, Axilla Left and Groin Updated: 25 1439    T3, Free [[id number]]  (Abnormal) Collected: 25 0705    Specimen: Blood Updated: 25 0806     T3, Free 1.62 pg/mL     Basic Metabolic Panel [[id number]]  (Abnormal) Collected: 25 07    Specimen: Blood Updated: 25 0800     Glucose 126 mg/dL      BUN 36 mg/dL      Creatinine 1.39 mg/dL      Sodium 141 mmol/L       Potassium 3.2 mmol/L      Chloride 99 mmol/L      CO2 28.5 mmol/L      Calcium 8.8 mg/dL      BUN/Creatinine Ratio 25.9     Anion Gap 13.5 mmol/L      eGFR 40.9 mL/min/1.73     Narrative:      GFR Categories in Chronic Kidney Disease (CKD)      GFR Category          GFR (mL/min/1.73)    Interpretation  G1                     90 or greater         Normal or high (1)  G2                      60-89                Mild decrease (1)  G3a                   45-59                Mild to moderate decrease  G3b                   30-44                Moderate to severe decrease  G4                    15-29                Severe decrease  G5                    14 or less           Kidney failure          (1)In the absence of evidence of kidney disease, neither GFR category G1 or G2 fulfill the criteria for CKD.    eGFR calculation 2021 CKD-EPI creatinine equation, which does not include race as a factor    Magnesium [[id number]]  (Normal) Collected: 04/07/25 0705    Specimen: Blood Updated: 04/07/25 0800     Magnesium 2.1 mg/dL     CBC (No Diff) [[id number]]  (Abnormal) Collected: 04/07/25 0705    Specimen: Blood Updated: 04/07/25 0746     WBC 10.33 10*3/mm3      RBC 3.85 10*6/mm3      Hemoglobin 11.4 g/dL      Hematocrit 35.7 %      MCV 92.7 fL      MCH 29.6 pg      MCHC 31.9 g/dL      RDW 14.3 %      RDW-SD 48.7 fl      MPV 11.8 fL      Platelets 305 10*3/mm3              Imaging:  Imaging Results (Last 24 Hours)       ** No results found for the last 24 hours. **               Assessment:     Urinary urge incontinence.    Plan:     Will increase her Ditropan to 10 mg daily.  We will set her up as an outpatient in our office for cystoscopy and urodynamic testing if this fails.  Will sign off.  Please call with any further questions.    Chucho Holcomb MD  04/07/25  17:03 EDT

## 2025-04-07 NOTE — PROGRESS NOTES
"    Name: Rika Millan ADMIT: 2025   : 1955  PCP: Bing Saavedra APRN    MRN: [id number] LOS: 1 days   AGE/SEX: 69 y.o. female  ROOM: [facility]     Subjective   Subjective   Patient seen this morning.  Reports she is feeling better, swelling significantly improved with IV diuresis.  No fevers, chills.  No nausea no vomiting.      Review of Systems   As above  Objective   Objective   Vital Signs  Temp:  [96.7 °F (35.9 °C)-98.7 °F (37.1 °C)] 98.7 °F (37.1 °C)  Heart Rate:  [55-69] 69  Resp:  [18] 18  BP: (117-159)/(64-87) 149/73  SpO2:  [94 %-100 %] 97 %  on   ;   Device (Oxygen Therapy): room air  Body mass index is 35.87 kg/m².  Physical Exam    General: Alert and oriented x3, no acute distress  HEENT: Normocephalic, atraumatic  CV: Regular rate and rhythm, no murmurs rubs or gallops  Lungs: Clear to auscultation bilaterally, no crackles or wheezes  Abdomen: Soft, nontender, nondistended  Extremities: 2+ bilateral lower extremity edema, no cyanosis     Results Review     I reviewed the patient's new clinical results.  Results from last 7 days   Lab Units 25  1323   WBC 10*3/mm3 9.94   HEMOGLOBIN g/dL 11.7*   PLATELETS 10*3/mm3 321     Results from last 7 days   Lab Units 25  1323   SODIUM mmol/L 140   POTASSIUM mmol/L 3.1*   CHLORIDE mmol/L 101   CO2 mmol/L 26.9   BUN mg/dL 43*   CREATININE mg/dL 1.42*   GLUCOSE mg/dL 113*   Estimated Creatinine Clearance: 39.7 mL/min (A) (by C-G formula based on SCr of 1.42 mg/dL (H)).  Results from last 7 days   Lab Units 25  1323   ALBUMIN g/dL 4.0   BILIRUBIN mg/dL 0.5   ALK PHOS U/L 126*   AST (SGOT) U/L 11   ALT (SGPT) U/L 9     Results from last 7 days   Lab Units 25  1323   CALCIUM mg/dL 9.8   ALBUMIN g/dL 4.0     No results found for: \"COVID19\"  No results found for: \"HGBA1C\", \"POCGLU\"        XR Chest 1 View  Narrative: XR CHEST 1 VW-     HISTORY: Female who is 69 years-old, bilateral lower extremity swelling     TECHNIQUE: " Frontal views of the chest     COMPARISON: 2/4/2025     FINDINGS: The heart size is borderline. Pulmonary vasculature is  unremarkable. Small likely atelectasis at the right base. No pleural  effusion, or pneumothorax. No acute osseous process.     Impression: Small likely atelectasis at the right base. Borderline heart  size. Follow-up as clinical indications persist.     This report was finalized on 4/6/2025 1:40 PM by Dr. Maldonado Fuentes M.D on Workstation: [facility]       Scheduled Medications  aspirin, 81 mg, Oral, Daily  atorvastatin, 20 mg, Oral, Daily  furosemide, 40 mg, Intravenous, Q12H  levothyroxine, 75 mcg, Oral, Q AM  metoprolol succinate XL, 25 mg, Oral, Q24H  mupirocin, 1 Application, Each Nare, BID  oxybutynin XL, 5 mg, Oral, Daily    Infusions   Diet  Diet: Cardiac; Healthy Heart (2-3 Na+); Fluid Consistency: Thin (IDDSI 0)    I have personally reviewed     [x]  Laboratory   [x]  Microbiology   [x]  Radiology   [x]  EKG/Telemetry  []  Cardiology/Vascular   []  Pathology    []  Records       Assessment/Plan     Active Hospital Problems    Diagnosis  POA    **Hypothyroidism [E03.9]  Yes    Peripheral arterial disease [I73.9]  Yes    Stage 3a chronic kidney disease [N18.31]  Yes    Hypertension [I10]  Yes      Resolved Hospital Problems   No resolved problems to display.     Patient is a 69-year-old female with a history of HTN, CKD stage III, recently admitted to the hospital from 2/4/2025  - 2/11/2024 complaining of weakness/falls, and was diagnosed with severe myxedema/hypothyroidism, with TSH on presentation of 92.6 and free T4 of less than 0.10, presented to the ED complaining of acute bilateral lower extremity edema.    Volume overload  CKD stage IIIb  Currently stable.  On IV diuresis per nephrology.  Monitor daily BMP.  Avoid  nephrotoxic drugs            hypothyroidism/recent admission with myxedema:  TSH during recent admission on 02/4/25 was 92.6.,  Free T4 was less than 0.10  On  levothyroxine 75 mcg daily outpatient.  Patient reports taking medication as prescribed on empty stomach in the morning.  TSH 18.0 on admission, up from 9.1 on 3/27/2025.  Free T4 low end of normal, free T3 low  Increase levothyroxine from 75 mcg daily to 100 mcg daily.  Will need to repeat labs in 4 weeks.    Hypokalemia   -potassium 3.2  -Replacement ordered.  Recheck labs this afternoon.      Essential hypertension:    Avoid ACE or ARB due to renal artery stenosis.  On metoprolol.  Blood pressure acutely stable     Right renal artery stenosis and peripheral vascular disease:  Present ABIs consistent with some mild PAD.    Vascular surgery evaluated during last admission and recommended medical management.  Continue aspirin and statin.     Hyperlipidemia:   Statin     Urinary incontinence  -Continue oxybutynin.  Patient had follow-up appointment tomorrow with urology however has been admitted.  Will consult urology to evaluate while inpatient.      Heparin SC for DVT prophylaxis.  Full code.  Discussed with patient and nursing staff.  Expected discharge date/ time has not been documented.       Copied text in this note has been reviewed and is accurate as of 04/07/25.         Dictated utilizing Dragon dictation        Caitlin Costa MD  [facility]  04/07/25  07:01 EDT

## 2025-04-07 NOTE — NURSING NOTE
04/07/25 0801   Wound 04/06/25 1650 Bilateral coccyx Pressure Injury   Placement Date/Time: 04/06/25 1650   Present on Original Admission: Yes  Side: Bilateral  Location: coccyx  Primary Wound Type: Pressure Injury   Dressing Appearance open to air   Base blanchable;moist;pink;red   Periwound pink   Edges irregular   Drainage Amount none   Wound 04/06/25 1640 Right proximal leg   Placement Date/Time: 04/06/25 1640   Side: Right  Orientation: proximal  Location: leg   Dressing Appearance dry;intact   Base brown;clean;moist;slough   Periwound intact   Edges irregular   Wound Length (cm) 5 cm   Wound Width (cm) 6 cm   Wound Surface Area (cm^2) 23.56 cm^2   Drainage Characteristics/Odor tan   Drainage Amount scant   Dressing Care hydrofiber;silver impregnated;silicone border foam   Wound 04/06/25 1640 Right calf   Placement Date/Time: 04/06/25 1640   Side: Right  Location: calf   Dressing Appearance open to air   Base maroon/purple   Drainage Amount none     WOCN consult: patient states she fell in shower, traumatic wound to right knee, wound care performed. Has bruise on right calf, buttock red is not pressure related. Has yeast assess under bilateral breast which is itching. Abd fold with scattered red areas as well. Able to reposition with assist of one. Recommendations placed in epic.

## 2025-04-07 NOTE — CONSULTS
[facility] Consult Note      Patient Name: Rika Millan  : 1955  MRN: [id number]  Primary Care Physician:  Bing Saavedra APRN  Referring Physician: No ref. provider found  Date of admission: 2025    Subjective     Reason for Consult:  JUSTIN / CKD     HPI:   Rika Millan is a 69 y.o. female with CKD stage 3B, PVD (with severe R renal artery stenosis and multivessel dz by CTA in Feb), HTN, hypothyroidism who presented last night for BLE edema and dyspnea.  Was hospitalized in FEB for myxedema and we followed then for JUSTIN which resolved (peak Cr 1.9, down to 1.3 at discharge).  TSH is elevated to 18 but FT4 1.02.  Cr stable 1.4 with K 3.1.  We cont'd torsemide at 20 mg daily upon discharge to SNF.  CXR shows no central congestion.  She was given lasix 80mg IV x1 yesterday PM with decent response, UOP 1600 mL. Denies n/v or diarrhea.  Echo on 25 showed normal LVSF, EF 55%, and normal diastolic fcn.  Serum albumin also normal (4).        Review of Systems:   14 point review of systems is otherwise negative except for mentioned above on HPI    Personal History     Past Medical History:   Diagnosis Date    Cancer     skin cancer    Disease of thyroid gland     Hypertension        Past Surgical History:   Procedure Laterality Date    CHOLECYSTECTOMY      ORIF ANKLE FRACTURE Right     more than 10years ago       Family History: family history includes No Known Problems in her father and mother.    Social History:  reports that she quit smoking about 3 months ago. Her smoking use included cigarettes. She started smoking about 40 years ago. She has a 20.8 pack-year smoking history. She has never used smokeless tobacco. She reports that she does not currently use alcohol. She reports that she does not use drugs.    Home Medications:  Prior to Admission medications    Medication Sig Start Date End Date Taking? Authorizing Provider   aspirin 81 MG EC tablet Take 1 tablet by  mouth Daily. 4/4/25  Yes Bing Saavedra APRN   atorvastatin (LIPITOR) 20 MG tablet Take 1 tablet by mouth Daily. 4/4/25  Yes Bing Saavedra APRN   levothyroxine (SYNTHROID, LEVOTHROID) 75 MCG tablet Take 1 tablet by mouth Every Morning. 4/4/25  Yes Bing Saavedra APRN   metoprolol succinate XL (TOPROL-XL) 25 MG 24 hr tablet Take 1 tablet by mouth Daily. 4/4/25  Yes Bing Saavedra APRN   oxybutynin XL (DITROPAN-XL) 5 MG 24 hr tablet Take 1 tablet by mouth Daily. 4/4/25  Yes Bing Saavedra APRN   torsemide (DEMADEX) 20 MG tablet Take 1 tablet by mouth Daily. 3/27/25  Yes Bing Saavedra APRN   acetaminophen (TYLENOL) 325 MG tablet Take 2 tablets by mouth Every 6 (Six) Hours As Needed for Mild Pain. 2/10/25   Blaine Leija MD   bisacodyl (DULCOLAX) 10 MG suppository Insert 1 suppository into the rectum Daily As Needed for Constipation (Use if bisacodyl oral is ineffective).  Patient not taking: Reported on 3/27/2025 2/11/25   Blaine Leija MD   bisacodyl (DULCOLAX) 5 MG EC tablet Take 1 tablet by mouth Daily As Needed for Constipation (Use if polyethylene glycol is ineffective).  Patient not taking: Reported on 3/27/2025 2/11/25   Blaine Leija MD   Emollient (Eucerin original healing lotion) lotion Apply 1 each topically to the appropriate area as directed Daily. Apply to both legs daily, available over-the-counter  Patient not taking: Reported on 3/27/2025 2/11/25   Blaine Leija MD   polyethylene glycol (MIRALAX) 17 g packet Take 17 g by mouth 2 (Two) Times a Day As Needed (As needed for constipation). 4/4/25   Bing Saavedra APRN   sennosides-docusate (PERICOLACE) 8.6-50 MG per tablet Take 2 tablets by mouth 2 (Two) Times a Day.  Patient not taking: Reported on 3/27/2025 2/11/25   Blaine Leija MD       Allergies:  Allergies   Allergen Reactions    Codeine Nausea Only    Morphine Nausea And  Vomiting    Percocet [Oxycodone-Acetaminophen] Nausea And Vomiting       Objective     Vitals:   Temp:  [96.7 °F (35.9 °C)-98.7 °F (37.1 °C)] 98.7 °F (37.1 °C)  Heart Rate:  [55-69] 69  Resp:  [18] 18  BP: (117-159)/(64-87) 149/73    Intake/Output Summary (Last 24 hours) at 4/7/2025 0648  Last data filed at 4/7/2025 0541  Gross per 24 hour   Intake 360 ml   Output 1600 ml   Net -1240 ml       Physical Exam:    General Appearance: pleasant WF comfortable alert on RA  Skin: warm and dry  HEENT: oral mucosa normal, nonicteric sclera  Neck: supple, no JVD  Lungs: CTA bilat no rales  Heart: RRR, normal S1 and S2  Abdomen: soft, nontender, nondistended  : no palpable bladder  Extremities: 2+ BLE edema   Neuro: normal speech and mental status     Scheduled Meds:     aspirin, 81 mg, Oral, Daily  atorvastatin, 20 mg, Oral, Daily  furosemide, 40 mg, Intravenous, Q12H  levothyroxine, 75 mcg, Oral, Q AM  metoprolol succinate XL, 25 mg, Oral, Q24H  mupirocin, 1 Application, Each Nare, BID  oxybutynin XL, 5 mg, Oral, Daily      IV Meds:        Results Reviewed:   I have personally reviewed the results from the time of this admission to 4/7/2025 06:48 EDT     Lab Results   Component Value Date    GLUCOSE 113 (H) 04/06/2025    CALCIUM 9.8 04/06/2025     04/06/2025    K 3.1 (L) 04/06/2025    CO2 26.9 04/06/2025     04/06/2025    BUN 43 (H) 04/06/2025    CREATININE 1.42 (H) 04/06/2025    BCR 30.3 (H) 04/06/2025    ANIONGAP 12.1 04/06/2025      Lab Results   Component Value Date    MG 2.4 02/06/2025    PHOS 3.3 02/10/2025    ALBUMIN 4.0 04/06/2025           Assessment / Plan     ASSESSMENT:  CKD stage 3B - Cr stable 1.4 (was 1.3 upon discharge couple mos ago when had JUSTIN, peak Cr then was 1.9).    Hypokalemia - in relation to diuresis, K 3.1.  No GI losses.  Need repeat labs today before give IV lasix as I cannot tell if was replaced yesterday  Vol overload - peripheral edema impressive still, but no central pamela on CXR.   Serum alb normal.  Hard to attribute to #5 based on TSH/FT4 trend.  Recent echo normal  HTN, in assoc with right FITZ - BP decent, 130 to 150 systolic range, on toprol XL.  Avoiding ACE/ARB given hx FITZ   Hypothyroidism - had myxedema in FEB.  On synthroid.  D/W [facility]   PVD - CTA noted from Feb 2025  OAB on ditropan    PLAN:  Hold IV lasix pending AM lab review (re: K trend)  Add aldactone 25 mg  D/W [facility]     Thank you for involving us in the care of Rika CASTANEDA Derricksilvino.  Please feel free to call with any questions.    Marcell Ellis MD  04/07/25  06:48 EDT    [facility]  [phone]

## 2025-04-08 LAB
ALBUMIN SERPL-MCNC: 3.5 G/DL (ref 3.5–5.2)
ANION GAP SERPL CALCULATED.3IONS-SCNC: 9.7 MMOL/L (ref 5–15)
BASOPHILS # BLD AUTO: 0.03 10*3/MM3 (ref 0–0.2)
BASOPHILS NFR BLD AUTO: 0.3 % (ref 0–1.5)
BUN SERPL-MCNC: 35 MG/DL (ref 8–23)
BUN/CREAT SERPL: 23 (ref 7–25)
C AURIS DNA SPEC QL NAA+NON-PROBE: NOT DETECTED
CALCIUM SPEC-SCNC: 9.1 MG/DL (ref 8.6–10.5)
CHLORIDE SERPL-SCNC: 101 MMOL/L (ref 98–107)
CO2 SERPL-SCNC: 30.3 MMOL/L (ref 22–29)
CREAT SERPL-MCNC: 1.52 MG/DL (ref 0.57–1)
DEPRECATED RDW RBC AUTO: 47.2 FL (ref 37–54)
EGFRCR SERPLBLD CKD-EPI 2021: 36.7 ML/MIN/1.73
EOSINOPHIL # BLD AUTO: 0.2 10*3/MM3 (ref 0–0.4)
EOSINOPHIL NFR BLD AUTO: 2.3 % (ref 0.3–6.2)
ERYTHROCYTE [DISTWIDTH] IN BLOOD BY AUTOMATED COUNT: 14.2 % (ref 12.3–15.4)
GLUCOSE SERPL-MCNC: 116 MG/DL (ref 65–99)
HCT VFR BLD AUTO: 33 % (ref 34–46.6)
HGB BLD-MCNC: 10.9 G/DL (ref 12–15.9)
IMM GRANULOCYTES # BLD AUTO: 0.05 10*3/MM3 (ref 0–0.05)
IMM GRANULOCYTES NFR BLD AUTO: 0.6 % (ref 0–0.5)
LYMPHOCYTES # BLD AUTO: 1.72 10*3/MM3 (ref 0.7–3.1)
LYMPHOCYTES NFR BLD AUTO: 19.7 % (ref 19.6–45.3)
MAGNESIUM SERPL-MCNC: 2.2 MG/DL (ref 1.6–2.4)
MCH RBC QN AUTO: 30 PG (ref 26.6–33)
MCHC RBC AUTO-ENTMCNC: 33 G/DL (ref 31.5–35.7)
MCV RBC AUTO: 90.9 FL (ref 79–97)
MONOCYTES # BLD AUTO: 0.56 10*3/MM3 (ref 0.1–0.9)
MONOCYTES NFR BLD AUTO: 6.4 % (ref 5–12)
NEUTROPHILS NFR BLD AUTO: 6.17 10*3/MM3 (ref 1.7–7)
NEUTROPHILS NFR BLD AUTO: 70.7 % (ref 42.7–76)
NRBC BLD AUTO-RTO: 0 /100 WBC (ref 0–0.2)
PHOSPHATE SERPL-MCNC: 2.8 MG/DL (ref 2.5–4.5)
PLATELET # BLD AUTO: 285 10*3/MM3 (ref 140–450)
PMV BLD AUTO: 11.5 FL (ref 6–12)
POTASSIUM SERPL-SCNC: 4.5 MMOL/L (ref 3.5–5.2)
RBC # BLD AUTO: 3.63 10*6/MM3 (ref 3.77–5.28)
SODIUM SERPL-SCNC: 141 MMOL/L (ref 136–145)
WBC NRBC COR # BLD AUTO: 8.73 10*3/MM3 (ref 3.4–10.8)

## 2025-04-08 PROCEDURE — 85025 COMPLETE CBC W/AUTO DIFF WBC: CPT | Performed by: INTERNAL MEDICINE

## 2025-04-08 PROCEDURE — 83735 ASSAY OF MAGNESIUM: CPT | Performed by: INTERNAL MEDICINE

## 2025-04-08 PROCEDURE — 97162 PT EVAL MOD COMPLEX 30 MIN: CPT

## 2025-04-08 PROCEDURE — 80069 RENAL FUNCTION PANEL: CPT | Performed by: INTERNAL MEDICINE

## 2025-04-08 PROCEDURE — 97110 THERAPEUTIC EXERCISES: CPT

## 2025-04-08 PROCEDURE — 25010000002 FUROSEMIDE PER 20 MG: Performed by: INTERNAL MEDICINE

## 2025-04-08 PROCEDURE — 25010000002 HEPARIN (PORCINE) PER 1000 UNITS: Performed by: STUDENT IN AN ORGANIZED HEALTH CARE EDUCATION/TRAINING PROGRAM

## 2025-04-08 RX ORDER — TORSEMIDE 20 MG/1
40 TABLET ORAL DAILY
Status: DISCONTINUED | OUTPATIENT
Start: 2025-04-09 | End: 2025-04-09 | Stop reason: HOSPADM

## 2025-04-08 RX ADMIN — HEPARIN SODIUM 5000 UNITS: 5000 INJECTION INTRAVENOUS; SUBCUTANEOUS at 14:33

## 2025-04-08 RX ADMIN — BISACODYL 5 MG: 5 TABLET, COATED ORAL at 08:43

## 2025-04-08 RX ADMIN — NYSTATIN: 100000 POWDER TOPICAL at 20:45

## 2025-04-08 RX ADMIN — Medication 10 ML: at 05:48

## 2025-04-08 RX ADMIN — MUPIROCIN 1 APPLICATION: 20 OINTMENT TOPICAL at 20:44

## 2025-04-08 RX ADMIN — LEVOTHYROXINE SODIUM 100 MCG: 100 TABLET ORAL at 05:47

## 2025-04-08 RX ADMIN — SPIRONOLACTONE 25 MG: 25 TABLET, FILM COATED ORAL at 08:43

## 2025-04-08 RX ADMIN — SENNOSIDES AND DOCUSATE SODIUM 2 TABLET: 50; 8.6 TABLET ORAL at 05:47

## 2025-04-08 RX ADMIN — METOPROLOL SUCCINATE 25 MG: 25 TABLET, EXTENDED RELEASE ORAL at 08:43

## 2025-04-08 RX ADMIN — ASPIRIN 81 MG: 81 TABLET, COATED ORAL at 08:43

## 2025-04-08 RX ADMIN — ATORVASTATIN CALCIUM 20 MG: 20 TABLET, FILM COATED ORAL at 08:43

## 2025-04-08 RX ADMIN — NYSTATIN: 100000 POWDER TOPICAL at 08:43

## 2025-04-08 RX ADMIN — MUPIROCIN 1 APPLICATION: 20 OINTMENT TOPICAL at 08:43

## 2025-04-08 RX ADMIN — OXYBUTYNIN CHLORIDE 10 MG: 10 TABLET, EXTENDED RELEASE ORAL at 08:43

## 2025-04-08 RX ADMIN — FUROSEMIDE 40 MG: 10 INJECTION, SOLUTION INTRAMUSCULAR; INTRAVENOUS at 05:47

## 2025-04-08 RX ADMIN — HEPARIN SODIUM 5000 UNITS: 5000 INJECTION INTRAVENOUS; SUBCUTANEOUS at 05:47

## 2025-04-08 RX ADMIN — POLYETHYLENE GLYCOL 3350 17 G: 17 POWDER, FOR SOLUTION ORAL at 05:48

## 2025-04-08 RX ADMIN — HEPARIN SODIUM 5000 UNITS: 5000 INJECTION INTRAVENOUS; SUBCUTANEOUS at 20:44

## 2025-04-08 NOTE — PROGRESS NOTES
Name: Rika Millan ADMIT: 2025   : 1955  PCP: Bing Saavedra APRN    MRN: [id number] LOS: 2 days   AGE/SEX: 69 y.o. female  ROOM: [facility]     Subjective   Subjective   Responded well to IV diuretics.  Reports feeling improved.  Had 2.6 L urine output in the 24 hours.  Denies shortness of breath.  No chest pain or palpitations.  On room air.    Review of Systems   As above  Objective   Objective   Vital Signs  Temp:  [97.5 °F (36.4 °C)-98.1 °F (36.7 °C)] 98 °F (36.7 °C)  Heart Rate:  [71-80] 71  Resp:  [16-18] 18  BP: (115-141)/(63-87) 123/72  SpO2:  [94 %-98 %] 98 %  on   ;   Device (Oxygen Therapy): room air  Body mass index is 35.87 kg/m².  Physical Exam    General: Alert, sitting up in the bed, not in distress,  HEENT: Normocephalic, atraumatic  CV: Regular rate and rhythm, no murmurs rubs or gallops  Lungs: CTA anteriorly, no wheezing  Abdomen: Soft, nontender, nondistended  Extremities: 1+ bilateral lower extremity edema, no cyanosis     Results Review     I reviewed the patient's new clinical results.  Results from last 7 days   Lab Units 25  0518 25  0705 25  1323   WBC 10*3/mm3 8.73 10.33 9.94   HEMOGLOBIN g/dL 10.9* 11.4* 11.7*   PLATELETS 10*3/mm3 285 305 321     Results from last 7 days   Lab Units 25  0518 25  1638 25  0705 25  1323   SODIUM mmol/L 141  --  141 140   POTASSIUM mmol/L 4.5 3.8 3.2* 3.1*   CHLORIDE mmol/L 101  --  99 101   CO2 mmol/L 30.3*  --  28.5 26.9   BUN mg/dL 35*  --  36* 43*   CREATININE mg/dL 1.52*  --  1.39* 1.42*   GLUCOSE mg/dL 116*  --  126* 113*   Estimated Creatinine Clearance: 37.1 mL/min (A) (by C-G formula based on SCr of 1.52 mg/dL (H)).  Results from last 7 days   Lab Units 25  0518 25  1323   ALBUMIN g/dL 3.5 4.0   BILIRUBIN mg/dL  --  0.5   ALK PHOS U/L  --  126*   AST (SGOT) U/L  --  11   ALT (SGPT) U/L  --  9     Results from last 7 days   Lab Units 25  0518 25  0705  "04/06/25  1323   CALCIUM mg/dL 9.1 8.8 9.8   ALBUMIN g/dL 3.5  --  4.0   MAGNESIUM mg/dL 2.2 2.1  --    PHOSPHORUS mg/dL 2.8  --   --      No results found for: \"COVID19\"  No results found for: \"HGBA1C\", \"POCGLU\"        XR Chest 1 View  Narrative: XR CHEST 1 VW-     HISTORY: Female who is 69 years-old, bilateral lower extremity swelling     TECHNIQUE: Frontal views of the chest     COMPARISON: 2/4/2025     FINDINGS: The heart size is borderline. Pulmonary vasculature is  unremarkable. Small likely atelectasis at the right base. No pleural  effusion, or pneumothorax. No acute osseous process.     Impression: Small likely atelectasis at the right base. Borderline heart  size. Follow-up as clinical indications persist.     This report was finalized on 4/6/2025 1:40 PM by Dr. Maldonado Fuentes M.D on Workstation: [facility]       Scheduled Medications  aspirin, 81 mg, Oral, Daily  atorvastatin, 20 mg, Oral, Daily  [Held by provider] furosemide, 40 mg, Intravenous, Q12H  heparin (porcine), 5,000 Units, Subcutaneous, Q8H  hydrocortisone-bacitracin-zinc oxide-nystatin, 1 Application, Topical, BID  levothyroxine, 100 mcg, Oral, Q AM  metoprolol succinate XL, 25 mg, Oral, Q24H  mupirocin, 1 Application, Each Nare, BID  nystatin, , Topical, Q12H  oxybutynin XL, 10 mg, Oral, Daily  spironolactone, 25 mg, Oral, Daily  [START ON 4/9/2025] torsemide, 40 mg, Oral, Daily    Infusions   Diet  Diet: Cardiac; Healthy Heart (2-3 Na+); Fluid Consistency: Thin (IDDSI 0)    I have personally reviewed     [x]  Laboratory   []  Microbiology   [x]  Radiology   []  EKG/Telemetry  []  Cardiology/Vascular   []  Pathology    []  Records       Assessment/Plan     Active Hospital Problems    Diagnosis  POA    **Hypothyroidism [E03.9]  Yes    Peripheral arterial disease [I73.9]  Yes    Stage 3a chronic kidney disease [N18.31]  Yes    Hypertension [I10]  Yes      Resolved Hospital Problems   No resolved problems to display.     Patient is a " 69-year-old female with a history of HTN, CKD stage III, recently admitted to the hospital from 2/4/2025  - 2/11/2024 complaining of weakness/falls, and was diagnosed with severe myxedema/hypothyroidism, with TSH on presentation of 92.6 and free T4 of less than 0.10, presented to the ED complaining of acute bilateral lower extremity edema.    Volume overload  CKD stage IIIb  Currently stable.  On IV diuresis per nephrology.  Monitor daily BMP.  Avoid the nephrotoxic drugs      hypothyroidism/recent admission with myxedema:  TSH during recent admission on 02/4/25 was 92.6.,  Free T4 was less than 0.10  On levothyroxine 75 mcg daily outpatient.  Patient reports taking medication as prescribed on empty stomach in the morning.  TSH 18.0 on admission, up from 9.1 on 3/27/2025.  Free T4 low end of normal, free T3 low  Increase levothyroxine from 75 mcg daily to 100 mcg daily.  Will need to repeat labs in 4 weeks to monitor and adjust further as indicated.    Hypokalemia   - wNL      Essential hypertension:    Avoid ACE or ARB due to renal artery stenosis.  On metoprolol.  Blood pressure acutely stable     Right renal artery stenosis and peripheral vascular disease:  Present ABIs consistent with some mild PAD.    Vascular surgery evaluated during last admission and recommended medical management.  Continue aspirin and statin.     Hyperlipidemia:   Statin     Urinary incontinence  Urology evaluated.  Increase Ditropan to 10 mg daily.  Plan for outpatient follow-up for cystoscopy and urodynamic testing if not responding to Ditropan.      Elevated troponin  -Troponin was elevated at 80 and 76 respectively on admission.  But stable compared to recent admission at 67 and 77 previously.  Was evaluated by cardiology during last admission.  Echocardiogram was obtained with no gross regional wall motion abnormality.  Per cardiology troponin was elevated secondary to severe hyperthyroidism and no further workup was  indicated.  -Patient is on aspirin and statin outpatient, continue         Heparin SC for DVT prophylaxis.  Full code.  Discussed with patient and nursing staff.  Disposition: SNF, likely stable to discharge in 1-2 days  Expected Discharge Date: 4/9/2025; Expected Discharge Time:        Copied text in this note has been reviewed and is accurate as of 04/08/25.         Dictated utilizing Dragon dictation        Caitlin Costa MD  [facility]  04/08/25  16:21 EDT

## 2025-04-08 NOTE — PLAN OF CARE
Goal Outcome Evaluation:      Pt alert and oriented x 4, VSS, edema decreased. On Lasix IV with last potassium level 3.8, this am potassium is pending. Pt states that she hasn't had a BM since Wednesday, medicated for constipation. Pt slept most of night with no other complaints.

## 2025-04-08 NOTE — PLAN OF CARE
Goal Outcome Evaluation:              Outcome Evaluation: Pt admitted with  hypothyroidism and CKD w/ volume overload.  Pt  was DCd here 2/11 to SNU, pt was home for ~1 wk before coming back.  She reports using RW for few days then had to start using  her WC due to difficulty walking.  Today pt able to sit up w/ SBA.  Stood few times w/ min A using RW.  Able to take few steps bed>chair w/ min A using RW, frequent cues for safety with UE placement.  Recommend sitting in chair for meals and DC to SNU.    Anticipated Discharge Disposition (PT): skilled nursing facility

## 2025-04-08 NOTE — PROGRESS NOTES
[facility] Progress Note      Patient Name: Rika Millan  : 1955  MRN: [id number]  Primary Care Physician:  Bing Saavedra APRN  Date of admission: 2025    Subjective     Interval History:   F/u CKD3    Review of Systems:   UOP 2.6L/24h  Feels better  Less edema  No dyspnea  Was having leg wraps at NH    Objective     Vitals:   Temp:  [97.5 °F (36.4 °C)-98.3 °F (36.8 °C)] 97.5 °F (36.4 °C)  Heart Rate:  [64-80] 80  Resp:  [16-18] 18  BP: (115-141)/(63-81) 128/81    Intake/Output Summary (Last 24 hours) at 2025 0805  Last data filed at 2025 0600  Gross per 24 hour   Intake 740 ml   Output 2650 ml   Net -1910 ml       Physical Exam:    General Appearance: alert, comfortable  Neck: supple, no JVD  Lungs: CTA bilat no rales  Heart: RRR, normal S1 and S2  Abdomen: soft, nontender, nondistended  Extremities: 1+ BLE edema     Scheduled Meds:     aspirin, 81 mg, Oral, Daily  atorvastatin, 20 mg, Oral, Daily  [Held by provider] furosemide, 40 mg, Intravenous, Q12H  furosemide, 40 mg, Intravenous, Q12H  heparin (porcine), 5,000 Units, Subcutaneous, Q8H  hydrocortisone-bacitracin-zinc oxide-nystatin, 1 Application, Topical, BID  levothyroxine, 100 mcg, Oral, Q AM  metoprolol succinate XL, 25 mg, Oral, Q24H  mupirocin, 1 Application, Each Nare, BID  nystatin, , Topical, Q12H  oxybutynin XL, 10 mg, Oral, Daily  spironolactone, 25 mg, Oral, Daily      IV Meds:        Results Reviewed:   I have personally reviewed the results from the time of this admission to 2025 08:05 EDT     Results from last 7 days   Lab Units 25  0518 25  1638 25  0705 25  1323   SODIUM mmol/L 141  --  141 140   POTASSIUM mmol/L 4.5 3.8 3.2* 3.1*   CHLORIDE mmol/L 101  --  99 101   CO2 mmol/L 30.3*  --  28.5 26.9   BUN mg/dL 35*  --  36* 43*   CREATININE mg/dL 1.52*  --  1.39* 1.42*   CALCIUM mg/dL 9.1  --  8.8 9.8   BILIRUBIN mg/dL  --   --   --  0.5   ALK PHOS U/L  --    --   --  126*   ALT (SGPT) U/L  --   --   --  9   AST (SGOT) U/L  --   --   --  11   GLUCOSE mg/dL 116*  --  126* 113*     Estimated Creatinine Clearance: 37.1 mL/min (A) (by C-G formula based on SCr of 1.52 mg/dL (H)).  Results from last 7 days   Lab Units 04/08/25  0518 04/07/25  0705   MAGNESIUM mg/dL 2.2 2.1   PHOSPHORUS mg/dL 2.8  --          Results from last 7 days   Lab Units 04/08/25  0518 04/07/25  0705 04/06/25  1323   WBC 10*3/mm3 8.73 10.33 9.94   HEMOGLOBIN g/dL 10.9* 11.4* 11.7*   PLATELETS 10*3/mm3 285 305 321           Assessment / Plan     ASSESSMENT:  CKD stage 3B - Cr up slightly 1.5 as expected with diuresis (was 1.3 upon discharge couple mos ago when had JUSTIN, peak Cr then was 1.9).    Hypokalemia - in relation to diuresis, K repleted   Vol overload - peripheral edema impressive still, but no central pamela on CXR.  Serum alb normal.  Hard to fully attribute this to #5 based on TSH/FT4 trend.  Recent echo normal.  Diuresed well with IV lasix.  Less edema.  No dyspnea.  Was having leg wraps   HTN, in assoc with right FITZ - BP adequate, on toprol XL.  Avoiding ACE/ARB given hx FITZ   Hypothyroidism - had myxedema in FEB.  Synthroid inc'd further per [facility]  PVD - CTA noted from Feb 2025  OAB on ditropan, dose inc'd per UROL agnieszka; note plan for outpatient cysto    PLAN:  DC IV lasix (received this AM)  Resume torsemide at higher dose 40mg daily tomorrow  Continue aldactone 25 mg  Ask wound RN to wrap legs   PT eval       Marcell Ellis MD  04/08/25  08:05 EDT    [facility]  [phone]

## 2025-04-08 NOTE — NURSING NOTE
CWCN: We are seeing the patient for evaluation of a compression wrap at the nurse's request. The patient is alert, the existing dressing is removed, and the area is cleansed. According to the patient, she is wearing a compression wrap during the day, resting at night, and starting again in the morning.  We had planned to change the compression wrap daily, but the patient and a family member on the phone wished to continue as they were doing.  Opticel dressing should be used over a weeping area or open wound, cover with ABD pads, Kerlix wraps, and ACE bandages.  Optifoam Gentle dressing may be used at night during rest time to bilateral heels.  The floor nurse can change the compression wrap.  RN notified.  WCN will follow up according her needs.

## 2025-04-08 NOTE — CASE MANAGEMENT/SOCIAL WORK
Discharge Planning Assessment  [facility]     Patient Name: Rika Millan  MRN: [id number]  Today's Date: 4/8/2025    Admit Date: 4/6/2025    Plan: Rehab   Discharge Needs Assessment       Row Name 04/08/25 1544       Living Environment    People in Home spouse    Current Living Arrangements home    Potentially Unsafe Housing Conditions none    Primary Care Provided by self    Provides Primary Care For no one    Family Caregiver if Needed spouse    Family Caregiver Names Adair    Quality of Family Relationships supportive    Able to Return to Prior Arrangements yes       Resource/Environmental Concerns    Resource/Environmental Concerns none    Transportation Concerns none       Transition Planning    Patient/Family Anticipates Transition to inpatient rehabilitation facility    Patient/Family Anticipated Services at Transition skilled nursing    Transportation Anticipated family or friend will provide       Discharge Needs Assessment    Equipment Currently Used at Home wheelchair;bath bench    Concerns to be Addressed no discharge needs identified    Anticipated Changes Related to Illness none    Equipment Needed After Discharge none                   Discharge Plan       Row Name 04/08/25 1546       Plan    Plan Rehab    Patient/Family in Agreement with Plan yes    Plan Comments Met with pt's family outside room. Introduced self, explained  role, verified face sheet. They stated that the plan is rehab at discharge. Family will provide transportation. Referrals given and entered. They denied any need for HH or DME at this time. CCP following. Nolan CAI RN                  Continued Care and Services - Admitted Since 4/6/2025       Destination       Service Provider Request Status Services Address Phone Fax Patient Preferred    [facility] Pending - Request Sent -- [address] [facility] [address] [phone] --    [facility] Pending - Request Sent  -- [address] [facility] [address] [phone] --    [facility] Pending - Request Sent -- [address] [facility] [address] [phone] --                  Selected Continued Care - Prior Encounters Includes continued care and service providers with selected services from prior encounters from 1/6/2025 to 4/8/2025      Discharged on 2/11/2025 Admission date: 2/4/2025 - Discharge disposition: Skilled Nursing Facility (DC - External)      Destination       Service Provider Services Address Phone Fax Patient Preferred    [facility] [address] [facility] [id number] [phone] --              Home Medical Care       Service Provider Services Address Phone Fax Patient Preferred    [facility] , Home Living Aide Services, Home Nursing, Home Rehabilitation, Home Health Services [address] [address] [id number] [phone] --                          Expected Discharge Date and Time       Expected Discharge Date Expected Discharge Time    Apr 9, 2025            Demographic Summary       Row Name 04/08/25 1544       General Information    Admission Type inpatient    Referral Source admission list    Preferred Language English       Contact Information    Permission Granted to Share Info With                    Functional Status       Row Name 04/08/25 1544       Functional Status    Usual Activity Tolerance moderate    Current Activity Tolerance poor                   Psychosocial    No documentation.                  Abuse/Neglect    No documentation.                  Legal    No documentation.                  Substance Abuse    No documentation.                  Patient Forms    No documentation.                     Nolan Jain, STEVEN

## 2025-04-08 NOTE — DISCHARGE PLACEMENT REQUEST
"Chiekh Millan (69 y.o. Female)       Date of Birth   1955    Social Security Number       Address   [address] [address]    Home Phone   [phone]    MRN   [id number]       Bahai   Bahai    Marital Status                               Admission Date   4/6/2025    Admission Type   Emergency    Admitting Provider   Beba Ponce MD    Attending Provider   Caitlin Costa MD    Department, Room/Bed   [address], E[address]/1       Discharge Date       Discharge Disposition       Discharge Destination                                 Attending Provider: Caitlin Costa MD    Allergies: Codeine, Morphine, Percocet [Oxycodone-acetaminophen]    Isolation: None   Infection: None   Code Status: CPR    Ht: 160 cm (63\")   Wt: 91.9 kg (202 lb 8 oz)    Admission Cmt: None   Principal Problem: Hypothyroidism [E03.9]                   Active Insurance as of 4/6/2025       Primary Coverage       Payor Plan Insurance Group Employer/Plan Group    MEDICARE MEDICARE A & B        Payor Plan Address Payor Plan Phone Number Payor Plan Fax Number Effective Dates    [address] [phone]  8/1/2019 - None Entered    [facility] [id number]         Subscriber Name Subscriber Birth Date Member ID       CHEIKH MILLAN 1955 [id number]               Secondary Coverage       Payor Plan Insurance Group Employer/Plan Group    [employer]P AAR HEALTH CARE OPTIONS        Payor Plan Address Payor Plan Phone Number Payor Plan Fax Number Effective Dates    [facility] [phone]  1/1/2025 - None Entered    [address]       [facility] [address]         Subscriber Name Subscriber Birth Date Member ID       CHEIKH MILLAN 1955 [id number]                     Emergency Contacts        (Rel.) Home Phone Work Phone Mobile Phone    Adair Millan (Spouse) [phone] -- --                "

## 2025-04-08 NOTE — THERAPY EVALUATION
Patient Name: Rika Millan  : 1955    MRN: [id number]                              Today's Date: 2025       Admit Date: 2025    Visit Dx:     ICD-10-CM ICD-9-CM   1. Peripheral edema  R60.0 782.3   2. Hypothyroidism, unspecified type  E03.9 244.9   3. JUSTIN (acute kidney injury)  N17.9 584.9     Patient Active Problem List   Diagnosis    JUSTIN (acute kidney injury)    Weakness    Hypothyroidism    Hypertension    Lower extremity edema    Elevated troponin    Obesity (BMI 30-39.9)    Class 2 severe obesity with serious comorbidity in adult    Stage 3a chronic kidney disease    Urine incontinence    Leg swelling    Renal artery stenosis    Peripheral arterial disease     Past Medical History:   Diagnosis Date    Cancer     skin cancer    Disease of thyroid gland     Hypertension      Past Surgical History:   Procedure Laterality Date    CHOLECYSTECTOMY      ORIF ANKLE FRACTURE Right     more than 10years ago      General Information       Row Name 25 1554          Physical Therapy Time and Intention    Document Type evaluation  -AR     Mode of Treatment physical therapy  -AR       Row Name 25 5337          General Information    Patient Profile Reviewed yes  -AR     Prior Level of Function independent:  normally at home w/ spouse using RW, was recently here, DC >SNU>home for 1 wk using RW again but began using WC as walking became more difficult  -AR     Existing Precautions/Restrictions fall  -AR     Barriers to Rehab none identified  -AR       Row Name 25 3038          Living Environment    Current Living Arrangements home  -AR     People in Home spouse  -AR       Row Name 25 1552          Home Main Entrance    Number of Stairs, Main Entrance none  ramp  -AR       Row Name 25 6254          Cognition    Orientation Status (Cognition) oriented to;person;place  -AR       Row Name 25 0028          Safety Issues/Impairments Affecting Functional Mobility    Safety  Issues Affecting Function (Mobility) problem-solving;ability to follow commands  -AR     Impairments Affecting Function (Mobility) balance;cognition;strength;endurance/activity tolerance;shortness of breath  -AR               User Key  (r) = Recorded By, (t) = Taken By, (c) = Cosigned By      Initials Name Provider Type    Bren Espinosa PT Physical Therapist                   Mobility       Row Name 04/08/25 1559          Bed Mobility    Bed Mobility supine-sit;sit-supine  -AR     Supine-Sit [place] (Bed Mobility) standby assist  -AR     Sit-Supine [place] (Bed Mobility) not tested  -AR     Assistive Device (Bed Mobility) bed rails;head of bed elevated  -AR     Comment, (Bed Mobility) cues for sequencing  -AR       Row Name 04/08/25 155          Transfers    Comment, (Transfers) frqeuent cues for safety w/ UE placement, trying to sit before at the chair and letting go of walker to reach for chair w/  1 hand too early  -AR       Row Name 04/08/25 1559          Bed-Chair Transfer    Bed-Chair [place] (Transfers) minimum assist (75% patient effort)  -AR     Assistive Device (Bed-Chair Transfers) walker, front-wheeled  -AR       Row Name 04/08/25 1559          Sit-Stand Transfer    Sit-Stand [place] (Transfers) minimum assist (75% patient effort)  -AR     Assistive Device (Sit-Stand Transfers) walker, front-wheeled  -AR     Comment, (Sit-Stand Transfer) sit<>stand 3x  -AR       Row Name 04/08/25 1550          Gait/Stairs (Locomotion)    [place] Level (Gait) minimum assist (75% patient effort)  -AR     Assistive Device (Gait) walker, front-wheeled  -AR     Patient was able to Ambulate yes  -AR     Distance in Feet (Gait) 2  few steps bed>chair  -AR     Deviations/Abnormal Patterns (Gait) festinating/shuffling;gait speed decreased  -AR     Bilateral Gait Deviations heel strike decreased;forward flexed posture  -AR               User Key  (r) = Recorded By, (t) = Taken By, (c) = Cosigned By       Initials Name Provider Type    AR Bren Graves, PT Physical Therapist                   Obj/Interventions       Row Name 04/08/25 1602          Range of Motion Comprehensive    Comment, General Range of Motion B LE WFL  -AR       Row Name 04/08/25 1602          Strength Comprehensive (MMT)    Comment, General Manual Muscle Testing (MMT) Assessment B LE 4/5  -AR       Row Name 04/08/25 1602          Balance    Balance Assessment standing dynamic balance  -AR     Dynamic Standing Balance minimal assist  -AR     Position/Device Used, Standing Balance walker, rolling  -AR               User Key  (r) = Recorded By, (t) = Taken By, (c) = Cosigned By      Initials Name Provider Type    AR Bren Graves, PT Physical Therapist                   Goals/Plan       Row Name 04/08/25 1612          Bed Mobility Goal 1 (PT)    Activity/Assistive Device (Bed Mobility Goal 1, PT) bed mobility activities, all  -AR     [place] Level/Cues Needed (Bed Mobility Goal 1, PT) modified independence  -AR     Time Frame (Bed Mobility Goal 1, PT) 1 week  -AR       Row Name 04/08/25 1612          Transfer Goal 1 (PT)    Activity/Assistive Device (Transfer Goal 1, PT) sit-to-stand/stand-to-sit;bed-to-chair/chair-to-bed;walker, rolling  -AR     [place] Level/Cues Needed (Transfer Goal 1, PT) standby assist  -AR     Time Frame (Transfer Goal 1, PT) 1 week  -AR       Row Name 04/08/25 1612          Gait Training Goal 1 (PT)    Activity/Assistive Device (Gait Training Goal 1, PT) gait (walking locomotion);walker, rolling  -AR     [place] Level (Gait Training Goal 1, PT) contact guard required  -AR     Distance (Gait Training Goal 1, PT) 20  -AR     Time Frame (Gait Training Goal 1, PT) 1 week  -AR       Row Name 04/08/25 1612          Therapy Assessment/Plan (PT)    Planned Therapy Interventions (PT) balance training;bed mobility training;gait training;home exercise program;neuromuscular re-education;transfer  training;strengthening;stair training;ROM (range of motion);postural re-education;patient/family education  -AR               User Key  (r) = Recorded By, (t) = Taken By, (c) = Cosigned By      Initials Name Provider Type    Bren Espinosa, PT Physical Therapist                   Clinical Impression       Row Name 04/08/25 1602          Pain    Pretreatment Pain Rating 2/10  -AR     Posttreatment Pain Rating 2/10  -AR     Pain Side/Orientation lower;bilateral  -AR     Response to Pain Interventions activity participation with tolerable pain  -AR       Row Name 04/08/25 1602          Plan of Care Review    Outcome Evaluation Pt admitted with  hypothyroidism and CKD w/ volume overload.  Pt  was DCd here 2/11 to SNU, pt was home for ~1 wk before coming back.  She reports using RW for few days then had to start using  her WC due to difficulty walking.  Today pt able to sit up w/ SBA.  Stood few times w/ min A using RW.  Able to take few steps bed>chair w/ min A using RW, frequent cues for safety with UE placement.  Recommend sitting in chair for meals and DC to SNU.  -AR       Row Name 04/08/25 1602          Therapy Assessment/Plan (PT)    Rehab Potential (PT) good  -AR     Criteria for Skilled Interventions Met (PT) yes  -AR     Therapy Frequency (PT) 5 times/wk  -AR       Row Name 04/08/25 1602          Vital Signs    O2 Delivery Pre Treatment room air  -AR       Row Name 04/08/25 1602          Positioning and Restraints    Pre-Treatment Position in bed  -AR     Post Treatment Position chair  -AR     In Chair notified nsg;reclined;sitting;call light within reach;encouraged to call for assist;exit alarm on  -AR               User Key  (r) = Recorded By, (t) = Taken By, (c) = Cosigned By      Initials Name Provider Type    Bren Espinosa, PT Physical Therapist                   Outcome Measures       Row Name 04/08/25 1612 04/08/25 0830       How much help from another person do you currently need...     Turning from your back to your side while in flat bed without using bedrails? 4  -AR 3  -SW    Moving from lying on back to sitting on the side of a flat bed without bedrails? 3  -AR 2  -SW    Moving to and from a bed to a chair (including a wheelchair)? 3  -AR 2  -SW    Standing up from a chair using your arms (e.g., wheelchair, bedside chair)? 3  -AR 1  -SW    Climbing 3-5 steps with a railing? 1  -AR 1  -SW    To walk in hospital room? 1  -AR 1  -SW    AM-PAC 6 Clicks Score (PT) 15  -AR 10  -SW    Highest Level of Mobility Goal 4 --> Transfer to chair/commode  -AR 4 --> Transfer to chair/commode  -SW      Row Name 04/08/25 1612          Functional Assessment    Outcome Measure Options AM-PAC 6 Clicks Basic Mobility (PT)  -AR               User Key  (r) = Recorded By, (t) = Taken By, (c) = Cosigned By      Initials Name Provider Type    AR Bren Graves, PT Physical Therapist    Perla Rachel RN Registered Nurse                                 Physical Therapy Education       Title: PT OT SLP Therapies (In Progress)       Topic: Physical Therapy (In Progress)       Point: Mobility training (In Progress)       Learning Progress Summary            Patient Acceptance, E, NR by AR at 4/8/2025 1613                      Point: Home exercise program (In Progress)       Learning Progress Summary            Patient Acceptance, E, NR by AR at 4/8/2025 1613                      Point: Body mechanics (In Progress)       Learning Progress Summary            Patient Acceptance, E, NR by AR at 4/8/2025 1613                      Point: Precautions (In Progress)       Learning Progress Summary            Patient Acceptance, E, NR by AR at 4/8/2025 1613                                      User Key       Initials Effective Dates Name Provider Type Discipline    AR 06/16/21 -  Bren Graves, PT Physical Therapist PT                  PT Recommendation and Plan  Planned Therapy Interventions (PT): balance training, bed  mobility training, gait training, home exercise program, neuromuscular re-education, transfer training, strengthening, stair training, ROM (range of motion), postural re-education, patient/family education  Outcome Evaluation: Pt admitted with  hypothyroidism and CKD w/ volume overload.  Pt  was DCd here 2/11 to SNU, pt was home for ~1 wk before coming back.  She reports using RW for few days then had to start using  her WC due to difficulty walking.  Today pt able to sit up w/ SBA.  Stood few times w/ min A using RW.  Able to take few steps bed>chair w/ min A using RW, frequent cues for safety with UE placement.  Recommend sitting in chair for meals and DC to SNU.     Time Calculation:         PT Charges       Row Name 04/08/25 1557             Time Calculation    Start Time 1512  -AR      Stop Time 1537  -AR      Time Calculation (min) 25 min  -AR      PT Received On 04/08/25  -AR      PT - Next Appointment 04/09/25  -AR      PT Goal Re-Cert Due Date 04/15/25  -AR                User Key  (r) = Recorded By, (t) = Taken By, (c) = Cosigned By      Initials Name Provider Type    AR Bren Graves PT Physical Therapist                  Therapy Charges for Today       Code Description Service Date Service Provider Modifiers Qty    33855790622 HC PT EVAL MOD COMPLEXITY 3 4/8/2025 Bren Graves, PT GP 1    25054919273 HC PT THER PROC EA 15 MIN 4/8/2025 Bren Graves, PT GP 1            PT G-Codes  Outcome Measure Options: AM-PAC 6 Clicks Basic Mobility (PT)  AM-PAC 6 Clicks Score (PT): 15  PT Discharge Summary  Anticipated Discharge Disposition (PT): skilled nursing facility    Bren Graves PT  4/8/2025

## 2025-04-09 ENCOUNTER — READMISSION MANAGEMENT (OUTPATIENT)
Dept: CALL CENTER | Facility: HOSPITAL | Age: 70
End: 2025-04-09
Payer: MEDICARE

## 2025-04-09 VITALS
BODY MASS INDEX: 37.3 KG/M2 | HEIGHT: 63 IN | RESPIRATION RATE: 16 BRPM | HEART RATE: 73 BPM | SYSTOLIC BLOOD PRESSURE: 119 MMHG | WEIGHT: 210.54 LBS | TEMPERATURE: 98.1 F | DIASTOLIC BLOOD PRESSURE: 58 MMHG | OXYGEN SATURATION: 100 %

## 2025-04-09 LAB
ALBUMIN SERPL-MCNC: 3.3 G/DL (ref 3.5–5.2)
ANION GAP SERPL CALCULATED.3IONS-SCNC: 8.3 MMOL/L (ref 5–15)
BASOPHILS # BLD AUTO: 0.03 10*3/MM3 (ref 0–0.2)
BASOPHILS NFR BLD AUTO: 0.4 % (ref 0–1.5)
BUN SERPL-MCNC: 27 MG/DL (ref 8–23)
BUN/CREAT SERPL: 20.8 (ref 7–25)
CALCIUM SPEC-SCNC: 9.3 MG/DL (ref 8.6–10.5)
CHLORIDE SERPL-SCNC: 98 MMOL/L (ref 98–107)
CO2 SERPL-SCNC: 30.7 MMOL/L (ref 22–29)
CREAT SERPL-MCNC: 1.3 MG/DL (ref 0.57–1)
DEPRECATED RDW RBC AUTO: 46.2 FL (ref 37–54)
EGFRCR SERPLBLD CKD-EPI 2021: 44.3 ML/MIN/1.73
EOSINOPHIL # BLD AUTO: 0.2 10*3/MM3 (ref 0–0.4)
EOSINOPHIL NFR BLD AUTO: 2.5 % (ref 0.3–6.2)
ERYTHROCYTE [DISTWIDTH] IN BLOOD BY AUTOMATED COUNT: 14.1 % (ref 12.3–15.4)
GLUCOSE SERPL-MCNC: 118 MG/DL (ref 65–99)
HCT VFR BLD AUTO: 33.1 % (ref 34–46.6)
HGB BLD-MCNC: 11.1 G/DL (ref 12–15.9)
IMM GRANULOCYTES # BLD AUTO: 0.05 10*3/MM3 (ref 0–0.05)
IMM GRANULOCYTES NFR BLD AUTO: 0.6 % (ref 0–0.5)
LYMPHOCYTES # BLD AUTO: 1.38 10*3/MM3 (ref 0.7–3.1)
LYMPHOCYTES NFR BLD AUTO: 17 % (ref 19.6–45.3)
MAGNESIUM SERPL-MCNC: 2.1 MG/DL (ref 1.6–2.4)
MCH RBC QN AUTO: 30.3 PG (ref 26.6–33)
MCHC RBC AUTO-ENTMCNC: 33.5 G/DL (ref 31.5–35.7)
MCV RBC AUTO: 90.4 FL (ref 79–97)
MONOCYTES # BLD AUTO: 0.41 10*3/MM3 (ref 0.1–0.9)
MONOCYTES NFR BLD AUTO: 5.1 % (ref 5–12)
NEUTROPHILS NFR BLD AUTO: 6.03 10*3/MM3 (ref 1.7–7)
NEUTROPHILS NFR BLD AUTO: 74.4 % (ref 42.7–76)
NRBC BLD AUTO-RTO: 0 /100 WBC (ref 0–0.2)
PHOSPHATE SERPL-MCNC: 3.6 MG/DL (ref 2.5–4.5)
PLATELET # BLD AUTO: 294 10*3/MM3 (ref 140–450)
PMV BLD AUTO: 11.2 FL (ref 6–12)
POTASSIUM SERPL-SCNC: 4.7 MMOL/L (ref 3.5–5.2)
RBC # BLD AUTO: 3.66 10*6/MM3 (ref 3.77–5.28)
SODIUM SERPL-SCNC: 137 MMOL/L (ref 136–145)
WBC NRBC COR # BLD AUTO: 8.1 10*3/MM3 (ref 3.4–10.8)

## 2025-04-09 PROCEDURE — 83735 ASSAY OF MAGNESIUM: CPT | Performed by: STUDENT IN AN ORGANIZED HEALTH CARE EDUCATION/TRAINING PROGRAM

## 2025-04-09 PROCEDURE — 25010000002 HEPARIN (PORCINE) PER 1000 UNITS: Performed by: STUDENT IN AN ORGANIZED HEALTH CARE EDUCATION/TRAINING PROGRAM

## 2025-04-09 PROCEDURE — 85025 COMPLETE CBC W/AUTO DIFF WBC: CPT | Performed by: INTERNAL MEDICINE

## 2025-04-09 PROCEDURE — 97166 OT EVAL MOD COMPLEX 45 MIN: CPT

## 2025-04-09 PROCEDURE — 97530 THERAPEUTIC ACTIVITIES: CPT

## 2025-04-09 PROCEDURE — 80069 RENAL FUNCTION PANEL: CPT | Performed by: INTERNAL MEDICINE

## 2025-04-09 RX ORDER — SPIRONOLACTONE 25 MG/1
25 TABLET ORAL DAILY
Qty: 30 TABLET | Refills: 0 | Status: SHIPPED | OUTPATIENT
Start: 2025-04-10

## 2025-04-09 RX ORDER — LEVOTHYROXINE SODIUM 100 UG/1
100 TABLET ORAL
Qty: 30 TABLET | Refills: 0 | Status: SHIPPED | OUTPATIENT
Start: 2025-04-10

## 2025-04-09 RX ORDER — OXYBUTYNIN CHLORIDE 10 MG/1
10 TABLET, EXTENDED RELEASE ORAL DAILY
Qty: 30 TABLET | Refills: 0 | Status: SHIPPED | OUTPATIENT
Start: 2025-04-10

## 2025-04-09 RX ADMIN — POLYETHYLENE GLYCOL 3350 17 G: 17 POWDER, FOR SOLUTION ORAL at 09:16

## 2025-04-09 RX ADMIN — OXYBUTYNIN CHLORIDE 10 MG: 10 TABLET, EXTENDED RELEASE ORAL at 09:01

## 2025-04-09 RX ADMIN — SPIRONOLACTONE 25 MG: 25 TABLET, FILM COATED ORAL at 09:01

## 2025-04-09 RX ADMIN — ATORVASTATIN CALCIUM 20 MG: 20 TABLET, FILM COATED ORAL at 09:01

## 2025-04-09 RX ADMIN — METOPROLOL SUCCINATE 25 MG: 25 TABLET, EXTENDED RELEASE ORAL at 09:01

## 2025-04-09 RX ADMIN — HEPARIN SODIUM 5000 UNITS: 5000 INJECTION INTRAVENOUS; SUBCUTANEOUS at 06:16

## 2025-04-09 RX ADMIN — NYSTATIN: 100000 POWDER TOPICAL at 09:02

## 2025-04-09 RX ADMIN — TORSEMIDE 40 MG: 20 TABLET ORAL at 09:01

## 2025-04-09 RX ADMIN — ASPIRIN 81 MG: 81 TABLET, COATED ORAL at 09:01

## 2025-04-09 RX ADMIN — LEVOTHYROXINE SODIUM 100 MCG: 100 TABLET ORAL at 06:16

## 2025-04-09 NOTE — OUTREACH NOTE
Prep Survey      Flowsheet Row Responses   [facility] facility patient discharged from? [place]   Is LACE score < 7 ? No   Eligibility Not Eligible   What are the reasons patient is not eligible? Other  [[facility]]   Does the patient have one of the following disease processes/diagnoses(primary or secondary)? Other   Prep survey completed? Yes            Marlena HERRMANN - Registered Nurse

## 2025-04-09 NOTE — THERAPY EVALUATION
Patient Name: Rika Millan  : 1955    MRN: [id number]                              Today's Date: 2025       Admit Date: 2025    Visit Dx:     ICD-10-CM ICD-9-CM   1. Peripheral edema  R60.0 782.3   2. Hypothyroidism, unspecified type  E03.9 244.9   3. JUSTIN (acute kidney injury)  N17.9 584.9     Patient Active Problem List   Diagnosis    JUSTIN (acute kidney injury)    Weakness    Hypothyroidism    Hypertension    Lower extremity edema    Elevated troponin    Obesity (BMI 30-39.9)    Class 2 severe obesity with serious comorbidity in adult    Stage 3a chronic kidney disease    Urine incontinence    Leg swelling    Renal artery stenosis    Peripheral arterial disease     Past Medical History:   Diagnosis Date    Cancer     skin cancer    Disease of thyroid gland     Hypertension      Past Surgical History:   Procedure Laterality Date    CHOLECYSTECTOMY      ORIF ANKLE FRACTURE Right     more than 10years ago      General Information       Row Name 25 1238          OT Time and Intention    Subjective Information no complaints  -HE     Document Type evaluation  -HE     Mode of Treatment individual therapy;occupational therapy  -HE     Patient Effort good  -HE     Symptoms Noted During/After Treatment none  -       Row Name 25 1238          General Information    Patient Profile Reviewed yes  -HE     Prior Level of Function independent:;ADL's  notes recent SNF stay with d/c home approx. 1 week ago, reports has required increased assist for ADLs via spouse and WC for mobility  -HE     Existing Precautions/Restrictions fall  -       Row Name 25 1238          Living Environment    Current Living Arrangements home  -HE     People in Home spouse  -HE       Row Name 25 1238          Home Main Entrance    Number of Stairs, Main Entrance none  ramp entry  -       Row Name 25 1238          Cognition    Orientation Status (Cognition) person;place;situation  -       Row Name  04/09/25 1238          Safety Issues/Impairments Affecting Functional Mobility    Impairments Affecting Function (Mobility) balance;endurance/activity tolerance;strength;cognition  -HE               User Key  (r) = Recorded By, (t) = Taken By, (c) = Cosigned By      Initials Name Provider Type    Bing Barba OT Occupational Therapist                     Mobility/ADL's       Row Name 04/09/25 1241          Bed Mobility    Bed Mobility supine-sit  -HE     Supine-Sit [place] (Bed Mobility) supervision;verbal cues  -     Assistive Device (Bed Mobility) bed rails;head of bed elevated  -       Row Name 04/09/25 1241          Transfers    Transfers bed-chair transfer;sit-stand transfer;stand-sit transfer  -     Comment, (Transfers) cues for safety with RW, disregards RW to begin to sit in chair despite prior cue to fully reach chair before initiating sit  -HE       Row Name 04/09/25 1241          Bed-Chair Transfer    Bed-Chair [place] (Transfers) contact guard;verbal cues;1 person assist  -     Assistive Device (Bed-Chair Transfers) walker, front-wheeled  -BOB       Row Name 04/09/25 1241          Sit-Stand Transfer    Sit-Stand [place] (Transfers) contact guard;minimum assist (75% patient effort);1 person assist  -     Assistive Device (Sit-Stand Transfers) walker, front-wheeled  -BOB       Row Name 04/09/25 1241          Stand-Sit Transfer    Stand-Sit [place] (Transfers) minimum assist (75% patient effort);contact guard;1 person assist;verbal cues  -     Assistive Device (Stand-Sit Transfers) walker, front-wheeled  -BOB       Row Name 04/09/25 1241          Functional Mobility    Functional Mobility- Ind. Level contact guard assist  -HE     Functional Mobility- Device walker, front-wheeled  -HE     Functional Mobility- Comment steps to HOB, fwd<>back from chair with RW tolerating approx. 1 min standing  -HE     Patient was able to Ambulate yes  -HE               User Key  (r) =  Recorded By, (t) = Taken By, (c) = Cosigned By      Initials Name Provider Type     Bing Valadez OT Occupational Therapist                   Obj/Interventions       [facility] Name 04/09/25 1244          Sensory Assessment (Somatosensory)    Sensory Assessment (Somatosensory) sensation intact  -HE       Row Name 04/09/25 1244          Vision Assessment/Intervention    Visual Impairment/Limitations WFL  -HE       Row Name 04/09/25 1244          Range of Motion Comprehensive    Comment, General Range of Motion lacking ROM RUE shoulder level 2/2 pain  -[facility] 04/09/25 1244          Strength Comprehensive (MMT)    Comment, General Manual Muscle Testing (MMT) Assessment grossly 3+5 BUE, did not formally assess RUE 2/2 pain at triceps  -[facility] Name 04/09/25 1244          Balance    Comment, Balance CGA with brief standing/ambulation  -               User Key  (r) = Recorded By, (t) = Taken By, (c) = Cosigned By      Initials Name Provider Type     Bing Valadez OT Occupational Therapist                   Goals/Plan       Row Name 04/09/25 1257          Transfer Goal 1 (OT)    Activity/Assistive Device (Transfer Goal 1, OT) transfers, all;toilet  -HE     [place] Level/Cues Needed (Transfer Goal 1, OT) contact guard required;verbal cues required  -HE     Time Frame (Transfer Goal 1, OT) 2 weeks  -HE     Progress/Outcome (Transfer Goal 1, OT) new goal  -HE       Row Name 04/09/25 1257          Bathing Goal 1 (OT)    Activity/Device (Bathing Goal 1, OT) lower body bathing  -HE     [place] Level/Cues Needed (Bathing Goal 1, OT) contact guard required;verbal cues required  -HE     Time Frame (Bathing Goal 1, OT) 2 weeks  -HE     Progress/Outcomes (Bathing Goal 1, OT) new goal  -HE       Row Name 04/09/25 1257          Dressing Goal 1 (OT)    Activity/Device (Dressing Goal 1, OT) dressing skills, all  -HE     [place]/Cues Needed (Dressing Goal 1, OT) supervision required  -HE     Time Frame  (Dressing Goal 1, OT) 2 weeks  -HE     Progress/Outcome (Dressing Goal 1, OT) new goal  -HE       Row Name 04/09/25 1257          Toileting Goal 1 (OT)    Activity/Device (Toileting Goal 1, OT) toileting skills, all  -HE     Time Frame (Toileting Goal 1, OT) 2 weeks  -HE     Progress/Outcome (Toileting Goal 1, OT) new goal  -HE       Row Name 04/09/25 1257          Grooming Goal 1 (OT)    Activity/Device (Grooming Goal 1, OT) grooming skills, all  -HE     [place] (Grooming Goal 1, OT) supervision required  -HE     Time Frame (Grooming Goal 1, OT) 2 weeks  -HE     Strategies/Barriers (Grooming Goal 1, OT) standing  -HE     Progress/Outcome (Grooming Goal 1, OT) new goal  -HE       Row Name 04/09/25 1257          Therapy Assessment/Plan (OT)    Planned Therapy Interventions (OT) activity tolerance training;BADL retraining;functional balance retraining;occupation/activity based interventions;patient/caregiver education/training;ROM/therapeutic exercise;strengthening exercise;transfer/mobility retraining  -HE               User Key  (r) = Recorded By, (t) = Taken By, (c) = Cosigned By      Initials Name Provider Type    HE Bing Valadez, MARIBEL Occupational Therapist                   Clinical Impression       Row Name 04/09/25 1248          Pain Assessment    Pre/Posttreatment Pain Comment discomfort at R triceps with activity  -HE       Row Name 04/09/25 1248          Plan of Care Review    Plan of Care Reviewed With patient  -HE     Progress no change  -HE     Outcome Evaluation Pt is a 69 year old female adm with CKD with volume overload. PMHx significant for hypothyroidism, PAD, CKD stg 3, HTN. Pt with recent adm and d/c to [facility] 2/11, was at home approx. 1 week before readmission. Pt reports increased use of WC within that time frame. Pt this date was able to stand and take few steps to/from chair with CGA-Min A with RW. Pt with concerns for strength, activity tolerance, balance impacting independence and  increasing risk of falls. IPOT to continue to follow with recommendations for [facility] at discharge pending continued progress.  -       Row Name 04/09/25 1248          Therapy Assessment/Plan (OT)    Rehab Potential (OT) good  -HE     Criteria for Skilled Therapeutic Interventions Met (OT) yes;skilled treatment is necessary;meets criteria  -     Therapy Frequency (OT) 5 times/wk  -       Row Name 04/09/25 1248          Therapy Plan Review/Discharge Plan (OT)    Anticipated Discharge Disposition (OT) [facility] nursing [facility]  -       Row Name 04/09/25 1248          Vital Signs    Posttreatment Heart Rate (beats/min) 70  -HE     Post SpO2 (%) 94  -HE     O2 Delivery Post Treatment supplemental O2  -HE     Pre Patient Position Supine  -HE     Post Patient Position Sitting  -[facility] Name 04/09/25 1248          Positioning and Restraints    Pre-Treatment Position in bed  -HE     Post Treatment Position chair  -HE     In Chair notified nsg;reclined;call light within reach;encouraged to call for assist  alarm pad under pt, notified RN alarm box not present in room  -HE               User Key  (r) = Recorded By, (t) = Taken By, (c) = Cosigned By      Initials Name Provider Type    HE Bing Valadez, MARIBEL Occupational Therapist                   Outcome Measures       Row Name 04/09/25 1258          How much help from another is currently needed...    Putting on and taking off regular lower body clothing? 2  -HE     Bathing (including washing, rinsing, and drying) 2  -HE     Toileting (which includes using toilet bed pan or urinal) 2  -HE     Putting on and taking off regular upper body clothing 3  -HE     Taking care of personal grooming (such as brushing teeth) 3  -HE     Eating meals 4  -HE     AM-PAC 6 Clicks Score (OT) 16  -HE       Row Name 04/09/25 1258          Modified Harvey Scale    Modified Archuleta Scale 4 - Moderately severe disability.  Unable to walk without assistance, and unable to attend to own bodily  needs without assistance.  -       Row Name 04/09/25 1258          Functional Assessment    Outcome Measure Options AM-PAC 6 Clicks Daily Activity (OT);Modified [place]  -               User Key  (r) = Recorded By, (t) = Taken By, (c) = Cosigned By      Initials Name Provider Type     Bing Valadez OT Occupational Therapist                    Occupational Therapy Education       Title: PT OT SLP Therapies (In Progress)       Topic: Occupational Therapy (Done)       Point: ADL training (Done)       Learning Progress Summary            Patient Acceptance, E, VU by  at 4/9/2025 1300    Comment: Pt educated on goals of session, role of OT, OT POC, d/c recommendations.                      Point: Home exercise program (Done)       Learning Progress Summary            Patient Acceptance, E, VU by  at 4/9/2025 1300    Comment: Pt educated on goals of session, role of OT, OT POC, d/c recommendations.                      Point: Precautions (Done)       Learning Progress Summary            Patient Acceptance, E, VU by  at 4/9/2025 1300    Comment: Pt educated on goals of session, role of OT, OT POC, d/c recommendations.                      Point: Body mechanics (Done)       Learning Progress Summary            Patient Acceptance, E, VU by  at 4/9/2025 1300    Comment: Pt educated on goals of session, role of OT, OT POC, d/c recommendations.                                      User Key       Initials Effective Dates Name Provider Type Discipline     02/18/25 -  Bing Valadez OT Occupational Therapist OT                  OT Recommendation and Plan  Planned Therapy Interventions (OT): activity tolerance training, BADL retraining, functional balance retraining, occupation/activity based interventions, patient/caregiver education/training, ROM/therapeutic exercise, strengthening exercise, transfer/mobility retraining  Therapy Frequency (OT): 5 times/wk  Plan of Care Review  Plan of Care Reviewed With:  patient  Progress: no change  Outcome Evaluation: Pt is a 69 year old female adm with CKD with volume overload. PMHx significant for hypothyroidism, PAD, CKD stg 3, HTN. Pt with recent adm and d/c to [facility] 2/11, was at home approx. 1 week before readmission. Pt reports increased use of WC within that time frame. Pt this date was able to stand and take few steps to/from chair with CGA-Min A with RW. Pt with concerns for strength, activity tolerance, balance impacting independence and increasing risk of falls. IPOT to continue to follow with recommendations for [facility] at discharge pending continued progress.     Time Calculation:   Evaluation Complexity (OT)  Review Occupational Profile/Medical/Therapy History Complexity: expanded/moderate complexity  Assessment, Occupational Performance/Identification of Deficit Complexity: 3-5 performance deficits  Clinical Decision Making Complexity (OT): detailed assessment/moderate complexity  Overall Complexity of Evaluation (OT): moderate complexity     Time Calculation- OT       Row Name 04/09/25 1300             Time Calculation- OT    OT Start Time 1052  -HE      OT Stop Time 1112  -HE      OT Time Calculation (min) 20 min  -HE      OT Received On 04/09/25  -HE      OT - Next Appointment 04/10/25  -HE      OT Goal Re-Cert Due Date 04/23/25  -HE         Timed Charges    07498 - OT Therapeutic Activity Minutes 10  -HE         Untimed Charges    OT Eval/Re-eval Minutes 10  -HE         Total Minutes    Timed Charges Total Minutes 10  -HE      Untimed Charges Total Minutes 10  -HE       Total Minutes 20  -HE                User Key  (r) = Recorded By, (t) = Taken By, (c) = Cosigned By      Initials Name Provider Type    HE Bing Valadez OT Occupational Therapist                  Therapy Charges for Today       Code Description Service Date Service Provider Modifiers Qty    71276920641  OT THERAPEUTIC ACT EA 15 MIN 4/9/2025 Bing Valadez OT GO 1    [id number]  OT EVAL MOD  COMPLEXITY 2 4/9/2025 Bing Valadez, OT GO 1                 Bing Valadez, OT  4/9/2025

## 2025-04-09 NOTE — PROGRESS NOTES
[facility] Progress Note      Patient Name: Rika Millan  : 1955  MRN: [id number]  Primary Care Physician:  Bing Saavedra APRN  Date of admission: 2025    Subjective     Interval History:   F/u CKD3B    Review of Systems:   UOP 2.5L/24h  Wt inaccurate via bed scale  No dyspnea   Objective     Vitals:   Temp:  [97.7 °F (36.5 °C)-98.5 °F (36.9 °C)] 97.9 °F (36.6 °C)  Heart Rate:  [71-86] 85  Resp:  [16-18] 16  BP: (115-135)/(67-87) 130/80    Intake/Output Summary (Last 24 hours) at 2025 0945  Last data filed at 2025 0539  Gross per 24 hour   Intake 240 ml   Output 2500 ml   Net -2260 ml       Physical Exam:    General Appearance: pleasant WF, chr ill, no acute distress   Skin: warm and dry  HEENT: oral mucosa normal, nonicteric sclera  Neck: supple, no JVD  Lungs: CTA  Heart: RRR, normal S1 and S2  Abdomen: soft, nontender, nondistended  : no palpable bladder  Extremities: 1+ BLE edema,no cyanosis or clubbing  Neuro: normal speech and mental status     Scheduled Meds:     aspirin, 81 mg, Oral, Daily  atorvastatin, 20 mg, Oral, Daily  [Held by provider] furosemide, 40 mg, Intravenous, Q12H  heparin (porcine), 5,000 Units, Subcutaneous, Q8H  hydrocortisone-bacitracin-zinc oxide-nystatin, 1 Application, Topical, BID  levothyroxine, 100 mcg, Oral, Q AM  metoprolol succinate XL, 25 mg, Oral, Q24H  mupirocin, 1 Application, Each Nare, BID  nystatin, , Topical, Q12H  oxybutynin XL, 10 mg, Oral, Daily  spironolactone, 25 mg, Oral, Daily  torsemide, 40 mg, Oral, Daily      IV Meds:        Results Reviewed:   I have personally reviewed the results from the time of this admission to 2025 09:45 EDT     Results from last 7 days   Lab Units 25  0707 25  0518 25  1638 25  0705 25  1323   SODIUM mmol/L 137 141  --  141 140   POTASSIUM mmol/L 4.7 4.5 3.8 3.2* 3.1*   CHLORIDE mmol/L 98 101  --  99 101   CO2 mmol/L 30.7* 30.3*  --  28.5  26.9   BUN mg/dL 27* 35*  --  36* 43*   CREATININE mg/dL 1.30* 1.52*  --  1.39* 1.42*   CALCIUM mg/dL 9.3 9.1  --  8.8 9.8   BILIRUBIN mg/dL  --   --   --   --  0.5   ALK PHOS U/L  --   --   --   --  126*   ALT (SGPT) U/L  --   --   --   --  9   AST (SGOT) U/L  --   --   --   --  11   GLUCOSE mg/dL 118* 116*  --  126* 113*     Estimated Creatinine Clearance: 44.2 mL/min (A) (by C-G formula based on SCr of 1.3 mg/dL (H)).  Results from last 7 days   Lab Units 04/09/25  0707 04/08/25  0518 04/07/25  0705   MAGNESIUM mg/dL 2.1 2.2 2.1   PHOSPHORUS mg/dL 3.6 2.8  --          Results from last 7 days   Lab Units 04/09/25  0708 04/08/25  0518 04/07/25  0705 04/06/25  1323   WBC 10*3/mm3 8.10 8.73 10.33 9.94   HEMOGLOBIN g/dL 11.1* 10.9* 11.4* 11.7*   PLATELETS 10*3/mm3 294 285 305 321           Assessment / Plan     ASSESSMENT:  CKD stage 3B - Cr up slightly 1.5 yesterday as expected with diuresis (was 1.3 upon discharge couple mos ago when had JUSTIN, peak Cr then was 1.9), down to 1.3 today; vol excess improved; lytes fine; CO2 up to 30.7 in assoc w fluid contraction d/t diuretic use  Hypokalemia - in relation w diuretic use, resolved  Vol overload - peripheral > central (neg CXR), diuresed well with IV lasix. Serum alb normal.  Hard to fully attribute this to #5 based on TSH/FT4 trend.  Recent echo normal. Was having leg wraps   HTN, in assoc with right FITZ - BP adequate, on toprol XL.  Avoiding ACE/ARB given hx FITZ   Hypothyroidism - had myxedema in FEB.  Synthroid inc'd further per A  PVD - CTA noted from Feb 2025  OAB on ditropan, dose inc'd per UROL eval; note plan for outpatient cysto    PLAN:  Transition to torsemide at higher dose 40mg daily   Continue aldactone 25 mg  Rehab placement in progress  Ok with discharge from nephrology standpoint on above regimen and will arrange follow up in 2 weeks    Agree with assessment and plan above as per Yani Sher NP, patient discharged today      Marcell Ellis,  MD  04/09/25  09:45 EDT    [facility]  [phone]

## 2025-04-09 NOTE — DISCHARGE SUMMARY
Patient Name: Rika Millan  : 1955  MRN: [id number]    Date of Admission: 2025  Date of Discharge:  2025  Primary Care Physician: Bing Saavedra APRN      Chief Complaint:   Leg Swelling      Discharge Diagnoses     Active Hospital Problems    Diagnosis  POA    **Hypothyroidism [E03.9]  Yes    Peripheral arterial disease [I73.9]  Yes    Stage 3a chronic kidney disease [N18.31]  Yes    Hypertension [I10]  Yes      Resolved Hospital Problems   No resolved problems to display.        Hospital Course     This is a 69-year-old woman with a past medical history of hypothyroidism who presented to hospital with lower extremity swelling  She was recently admitted for myxedema coma and discharged on oral oral levothyroxine  She was initially diuresed by nephrology.  Her thyroid studies were noted to be have improved from previous admission and levothyroxine was increased from 75 mcg to 100 mcg daily as TSH was still elevated  She will be placed on torsemide 40 mg daily and aldactone 25mg and discharged to a skilled nursing facility.    The patient will need repeat TSH in about 4 weeks after discharge.    Physical Exam:  Temp:  [97.7 °F (36.5 °C)-98.5 °F (36.9 °C)] 98.1 °F (36.7 °C)  Heart Rate:  [71-86] 73  Resp:  [16-18] 16  BP: (119-135)/(58-85) 119/58  Body mass index is 37.3 kg/m².  Physical Exam  Constitutional:       General: She is not in acute distress.  HENT:      Head: Normocephalic and atraumatic.   Cardiovascular:      Rate and Rhythm: Normal rate and regular rhythm.   Pulmonary:      Effort: Pulmonary effort is normal. No respiratory distress.   Abdominal:      General: There is no distension.      Palpations: Abdomen is soft.      Tenderness: There is no abdominal tenderness.   Neurological:      Mental Status: She is alert and oriented to person, place, and time.         Consultants     Consult Orders (all) (From admission, onward)       Start     Ordered    25 4332   Inpatient Urology Consult  Once        Specialty:  Urology  Provider:  Oj Gamble MD    04/07/25 1347    04/06/25 2345  Inpatient Nephrology Consult  Once        Comments: Consult called at 2344 4/6/2025, spoke with Alfred   Specialty:  Nephrology  Provider:  Marcell Ellis MD    04/06/25 2346    04/06/25 2328  Inpatient Nephrology Consult  Once,   Status:  Canceled        Specialty:  Nephrology  Provider:  Marcell Ellis MD    04/06/25 2327 04/06/25 2011  Inpatient Case Management  Consult  Once        Provider:  (Not yet assigned)    04/06/25 2010 04/06/25 1442  [facility] (on-call MD unless specified) Details  Once        Specialty:  Hospitalist  Provider:  Beba Ponce MD    04/06/25 1441                  Procedures     Imaging Results (All)       Procedure Component Value Units Date/Time    XR Chest 1 View [[id number]] Collected: 04/06/25 1339     Updated: 04/06/25 1344    Narrative:      XR CHEST 1 VW-     HISTORY: Female who is 69 years-old, bilateral lower extremity swelling     TECHNIQUE: Frontal views of the chest     COMPARISON: 2/4/2025     FINDINGS: The heart size is borderline. Pulmonary vasculature is  unremarkable. Small likely atelectasis at the right base. No pleural  effusion, or pneumothorax. No acute osseous process.       Impression:      Small likely atelectasis at the right base. Borderline heart  size. Follow-up as clinical indications persist.     This report was finalized on 4/6/2025 1:40 PM by Dr. Maldonado Fuentes M.D on Workstation: [id number]               Pertinent Labs     Results from last 7 days   Lab Units 04/09/25  0708 04/08/25  0518 04/07/25  0705 04/06/25  1323   WBC 10*3/mm3 8.10 8.73 10.33 9.94   HEMOGLOBIN g/dL 11.1* 10.9* 11.4* 11.7*   PLATELETS 10*3/mm3 294 285 305 321     Results from last 7 days   Lab Units 04/09/25  0707 04/08/25  0518 04/07/25  1638 04/07/25  0705 04/06/25  1323   SODIUM mmol/L 137 141  --  141  "140   POTASSIUM mmol/L 4.7 4.5 3.8 3.2* 3.1*   CHLORIDE mmol/L 98 101  --  99 101   CO2 mmol/L 30.7* 30.3*  --  28.5 26.9   BUN mg/dL 27* 35*  --  36* 43*   CREATININE mg/dL 1.30* 1.52*  --  1.39* 1.42*   GLUCOSE mg/dL 118* 116*  --  126* 113*   Estimated Creatinine Clearance: 44.2 mL/min (A) (by C-G formula based on SCr of 1.3 mg/dL (H)).  Results from last 7 days   Lab Units 04/09/25  0707 04/08/25  0518 04/06/25  1323   ALBUMIN g/dL 3.3* 3.5 4.0   BILIRUBIN mg/dL  --   --  0.5   ALK PHOS U/L  --   --  126*   AST (SGOT) U/L  --   --  11   ALT (SGPT) U/L  --   --  9     Results from last 7 days   Lab Units 04/09/25  0707 04/08/25  0518 04/07/25  0705 04/06/25  1323   CALCIUM mg/dL 9.3 9.1 8.8 9.8   ALBUMIN g/dL 3.3* 3.5  --  4.0   MAGNESIUM mg/dL 2.1 2.2 2.1  --    PHOSPHORUS mg/dL 3.6 2.8  --   --        Results from last 7 days   Lab Units 04/06/25  1430 04/06/25  1323   HSTROP T ng/L 76* 80*   PROBNP pg/mL  --  3,390.0*           Invalid input(s): \"LDLCALC\"        Test Results Pending at Discharge       Discharge Details        Discharge Medications        New Medications        Instructions Start Date   spironolactone 25 MG tablet  Commonly known as: ALDACTONE   25 mg, Oral, Daily   Start Date: April 10, 2025            Changes to Medications        Instructions Start Date   levothyroxine 100 MCG tablet  Commonly known as: SYNTHROID, LEVOTHROID  What changed:   medication strength  how much to take   100 mcg, Oral, Every Early Morning   Start Date: April 10, 2025     oxybutynin XL 10 MG 24 hr tablet  Commonly known as: DITROPAN-XL  What changed:   medication strength  how much to take   10 mg, Oral, Daily   Start Date: April 10, 2025     Torsemide 40 MG tablet  What changed:   medication strength  how much to take   40 mg, Oral, Daily   Start Date: April 10, 2025            Continue These Medications        Instructions Start Date   acetaminophen 325 MG tablet  Commonly known as: TYLENOL   650 mg, Oral, Every 6 " Hours PRN      aspirin 81 MG EC tablet   81 mg, Oral, Daily      atorvastatin 20 MG tablet  Commonly known as: LIPITOR   20 mg, Oral, Daily      metoprolol succinate XL 25 MG 24 hr tablet  Commonly known as: TOPROL-XL   25 mg, Oral, Every 24 Hours Scheduled      polyethylene glycol 17 g packet  Commonly known as: MIRALAX   17 g, Oral, 2 Times Daily PRN             Stop These Medications      bisacodyl 10 MG suppository  Commonly known as: DULCOLAX     bisacodyl 5 MG EC tablet  Commonly known as: DULCOLAX     Eucerin original healing lotion lotion     sennosides-docusate 8.6-50 MG per tablet  Commonly known as: PERICOLACE              Allergies   Allergen Reactions    Codeine Nausea Only    Morphine Nausea And Vomiting    Percocet [Oxycodone-Acetaminophen] Nausea And Vomiting         Discharge Disposition:  Home or Self Care    Discharge Diet:  Diet Order   Procedures    Diet: Cardiac; Healthy Heart (2-3 Na+); Fluid Consistency: Thin (IDDSI 0)       Discharge Activity:       CODE STATUS:    Code Status and Medical Interventions: CPR (Attempt to Resuscitate); Full   Ordered at: 04/06/25 1625     Code Status (Patient has no pulse and is not breathing):    CPR (Attempt to Resuscitate)     Medical Interventions (Patient has pulse or is breathing):    Full       Future Appointments   Date Time Provider Department Center   6/27/2025  1:45 PM Bing Saavedra APRN [facility]   8/13/2025  8:00 AM [facility]AS RM 2 [facility]   8/13/2025  9:00 AM Margret Arriola MD [facility]      Contact information for follow-up providers       Bing Saavedra APRN .    Specialty: Nurse Practitioner  Contact information:  [address]  [facility] [address] [phone]                       Contact information for after-discharge care       Destination       [facility] .    Service: Skilled Nursing  Contact information:  [address] [facility]  [address]  [phone]                                   Time Spent on Discharge:  Greater than 30 minutes      Yoan Gutierrez MD  [facility]  04/09/25  12:37 EDT

## 2025-04-09 NOTE — PLAN OF CARE
Goal Outcome Evaluation:  VSS. No c/o pain. Bilateral LE unwrapped per order. Turn Q2 hours. Monitor labs. Plan of care ongoing.

## 2025-04-09 NOTE — CASE MANAGEMENT/SOCIAL WORK
Continued Stay Note  [facility]     Patient Name: Rika Millan  MRN: [id number]  Today's Date: 4/9/2025    Admit Date: 4/6/2025    Plan: [facility]   Discharge Plan       Row Name 04/09/25 1256       Plan    Plan [facility]    Patient/Family in Agreement with Plan yes    Plan Comments Met with pt in room. She confirmed the plan is for her to go to rehab at discharge. Alejandra/ Caitlin stated that she has a bed available today at [facility]. Pt is agreeable with [facility]. Updated Alejandra, primary RN, and MD. Pt stated that she would call her  now and he would be able to transport her today after the bed is available at 3pm. Discharge order noted. Packet complete and given to primary RN. Discharge summary faxed to facility. No further needs assessed at this time. CCP following. Nolan CAI RN                   Discharge Codes    No documentation.                 Expected Discharge Date and Time       Expected Discharge Date Expected Discharge Time    Apr 9, 2025               Nolan Jain RN

## 2025-04-09 NOTE — DISCHARGE PLACEMENT REQUEST
"Cheikh Millan (69 y.o. Female)       Date of Birth   1955    Social Security Number       Address   [address] [address]    Home Phone   [phone]    MRN   [id number]       Mandaeism   Adventism    Marital Status                               Admission Date   4/6/2025    Admission Type   Emergency    Admitting Provider   Beba Ponce MD    Attending Provider   Yoan Gutierrez MD    Department, Room/Bed   [address], E559/1       Discharge Date       Discharge Disposition   Home or Self Care    Discharge Destination                                 Attending Provider: Yoan Gutierrez MD    Allergies: Codeine, Morphine, Percocet [Oxycodone-acetaminophen]    Isolation: None   Infection: None   Code Status: CPR    Ht: 160 cm (63\")   Wt: 95.5 kg (210 lb 8.6 oz)    Admission Cmt: None   Principal Problem: Hypothyroidism [E03.9]                   Active Insurance as of 4/6/2025       Primary Coverage       Payor Plan Insurance Group Employer/Plan Group    MEDICARE MEDICARE A & B        Payor Plan Address Payor Plan Phone Number Payor Plan Fax Number Effective Dates    [address] [phone]  8/1/2019 - None Entered    [facility] [address]         Subscriber Name Subscriber Birth Date Member ID       CHEIKH MILLAN 1955 [id number]               Secondary Coverage       Payor Plan Insurance Group Employer/Plan Group    [employer] SUP AAR HEALTH CARE OPTIONS        Payor Plan Address Payor Plan Phone Number Payor Plan Fax Number Effective Dates    [facility] [phone]  1/1/2025 - None Entered    [address]       [facility] [address]         Subscriber Name Subscriber Birth Date Member ID       CHEIKH MILLAN 1955 [id number]                     Emergency Contacts        (Rel.) Home Phone Work Phone Mobile Phone    Adair Millan (Spouse) [phone] -- --                 Discharge Summary        Yoan Gutierrez MD at " 25 1236              Patient Name: Rika Millan  : 1955  MRN: [id number]    Date of Admission: 2025  Date of Discharge:  2025  Primary Care Physician: Bing Saavedra APRN      Chief Complaint:   Leg Swelling      Discharge Diagnoses     Active Hospital Problems    Diagnosis  POA    **Hypothyroidism [E03.9]  Yes    Peripheral arterial disease [I73.9]  Yes    Stage 3a chronic kidney disease [N18.31]  Yes    Hypertension [I10]  Yes      Resolved Hospital Problems   No resolved problems to display.        Hospital Course     This is a 69-year-old woman with a past medical history of hypothyroidism who presented to hospital with lower extremity swelling  She was recently admitted for myxedema coma and discharged on oral oral levothyroxine  She was initially diuresed by nephrology.  Her thyroid studies were noted to be have improved from previous admission and levothyroxine was increased from 75 mcg to 100 mcg daily as TSH was still elevated  She will be placed on torsemide 40 mg daily and aldactone 25mg and discharged to a skilled nursing facility.    The patient will need repeat TSH in about 4 weeks after discharge.    Physical Exam:  Temp:  [97.7 °F (36.5 °C)-98.5 °F (36.9 °C)] 98.1 °F (36.7 °C)  Heart Rate:  [71-86] 73  Resp:  [16-18] 16  BP: (119-135)/(58-85) 119/58  Body mass index is 37.3 kg/m².  Physical Exam  Constitutional:       General: She is not in acute distress.  HENT:      Head: Normocephalic and atraumatic.   Cardiovascular:      Rate and Rhythm: Normal rate and regular rhythm.   Pulmonary:      Effort: Pulmonary effort is normal. No respiratory distress.   Abdominal:      General: There is no distension.      Palpations: Abdomen is soft.      Tenderness: There is no abdominal tenderness.   Neurological:      Mental Status: She is alert and oriented to person, place, and time.         Consultants     Consult Orders (all) (From admission, onward)       Start     Ordered     04/07/25 1347  Inpatient Urology Consult  Once        Specialty:  Urology  Provider:  Oj Gamble MD    04/07/25 1347    04/06/25 2345  Inpatient Nephrology Consult  Once        Comments: Consult called at 2344 4/6/2025, spoke with Alfred   Specialty:  Nephrology  Provider:  Marcell Ellis MD    04/06/25 2346    04/06/25 2328  Inpatient Nephrology Consult  Once,   Status:  Canceled        Specialty:  Nephrology  Provider:  Marcell Ellis MD    04/06/25 2327 04/06/25 2011  Inpatient Case Management  Consult  Once        Provider:  (Not yet assigned)    04/06/25 2010 04/06/25 1442  [facility] (on-call MD unless specified) Details  Once        Specialty:  Hospitalist  Provider:  Beba Ponce MD    04/06/25 1441                  Procedures     Imaging Results (All)       Procedure Component Value Units Date/Time    XR Chest 1 View [[id number]] Collected: 04/06/25 1339     Updated: 04/06/25 1344    Narrative:      XR CHEST 1 VW-     HISTORY: Female who is 69 years-old, bilateral lower extremity swelling     TECHNIQUE: Frontal views of the chest     COMPARISON: 2/4/2025     FINDINGS: The heart size is borderline. Pulmonary vasculature is  unremarkable. Small likely atelectasis at the right base. No pleural  effusion, or pneumothorax. No acute osseous process.       Impression:      Small likely atelectasis at the right base. Borderline heart  size. Follow-up as clinical indications persist.     This report was finalized on 4/6/2025 1:40 PM by Dr. Maldonado Fuentes M.D on Workstation: [id number]               Pertinent Labs     Results from last 7 days   Lab Units 04/09/25  0708 04/08/25  0518 04/07/25  0705 04/06/25  1323   WBC 10*3/mm3 8.10 8.73 10.33 9.94   HEMOGLOBIN g/dL 11.1* 10.9* 11.4* 11.7*   PLATELETS 10*3/mm3 294 285 305 321     Results from last 7 days   Lab Units 04/09/25  0707 04/08/25  0518 04/07/25  1638 04/07/25  0705 04/06/25  1323   SODIUM mmol/L 137  "141  --  141 140   POTASSIUM mmol/L 4.7 4.5 3.8 3.2* 3.1*   CHLORIDE mmol/L 98 101  --  99 101   CO2 mmol/L 30.7* 30.3*  --  28.5 26.9   BUN mg/dL 27* 35*  --  36* 43*   CREATININE mg/dL 1.30* 1.52*  --  1.39* 1.42*   GLUCOSE mg/dL 118* 116*  --  126* 113*   Estimated Creatinine Clearance: 44.2 mL/min (A) (by C-G formula based on SCr of 1.3 mg/dL (H)).  Results from last 7 days   Lab Units 04/09/25  0707 04/08/25 0518 04/06/25  1323   ALBUMIN g/dL 3.3* 3.5 4.0   BILIRUBIN mg/dL  --   --  0.5   ALK PHOS U/L  --   --  126*   AST (SGOT) U/L  --   --  11   ALT (SGPT) U/L  --   --  9     Results from last 7 days   Lab Units 04/09/25  0707 04/08/25  0518 04/07/25  0705 04/06/25  1323   CALCIUM mg/dL 9.3 9.1 8.8 9.8   ALBUMIN g/dL 3.3* 3.5  --  4.0   MAGNESIUM mg/dL 2.1 2.2 2.1  --    PHOSPHORUS mg/dL 3.6 2.8  --   --        Results from last 7 days   Lab Units 04/06/25  1430 04/06/25  1323   HSTROP T ng/L 76* 80*   PROBNP pg/mL  --  3,390.0*           Invalid input(s): \"LDLCALC\"        Test Results Pending at Discharge       Discharge Details        Discharge Medications        New Medications        Instructions Start Date   spironolactone 25 MG tablet  Commonly known as: ALDACTONE   25 mg, Oral, Daily   Start Date: April 10, 2025            Changes to Medications        Instructions Start Date   levothyroxine 100 MCG tablet  Commonly known as: SYNTHROID, LEVOTHROID  What changed:   medication strength  how much to take   100 mcg, Oral, Every Early Morning   Start Date: April 10, 2025     oxybutynin XL 10 MG 24 hr tablet  Commonly known as: DITROPAN-XL  What changed:   medication strength  how much to take   10 mg, Oral, Daily   Start Date: April 10, 2025     Torsemide 40 MG tablet  What changed:   medication strength  how much to take   40 mg, Oral, Daily   Start Date: April 10, 2025            Continue These Medications        Instructions Start Date   acetaminophen 325 MG tablet  Commonly known as: TYLENOL   650 mg, " Oral, Every 6 Hours PRN      aspirin 81 MG EC tablet   81 mg, Oral, Daily      atorvastatin 20 MG tablet  Commonly known as: LIPITOR   20 mg, Oral, Daily      metoprolol succinate XL 25 MG 24 hr tablet  Commonly known as: TOPROL-XL   25 mg, Oral, Every 24 Hours Scheduled      polyethylene glycol 17 g packet  Commonly known as: MIRALAX   17 g, Oral, 2 Times Daily PRN             Stop These Medications      bisacodyl 10 MG suppository  Commonly known as: DULCOLAX     bisacodyl 5 MG EC tablet  Commonly known as: DULCOLAX     Eucerin original healing lotion lotion     sennosides-docusate 8.6-50 MG per tablet  Commonly known as: PERICOLACE              Allergies   Allergen Reactions    Codeine Nausea Only    Morphine Nausea And Vomiting    Percocet [Oxycodone-Acetaminophen] Nausea And Vomiting         Discharge Disposition:  Home or Self Care    Discharge Diet:  Diet Order   Procedures    Diet: Cardiac; Healthy Heart (2-3 Na+); Fluid Consistency: Thin (IDDSI 0)       Discharge Activity:       CODE STATUS:    Code Status and Medical Interventions: CPR (Attempt to Resuscitate); Full   Ordered at: 04/06/25 1625     Code Status (Patient has no pulse and is not breathing):    CPR (Attempt to Resuscitate)     Medical Interventions (Patient has pulse or is breathing):    Full       Future Appointments   Date Time Provider Department Center   6/27/2025  1:45 PM Bing Saavedra APRN [facility]   8/13/2025  8:00 AM [facility] OP VAS RM 2 [facility]   8/13/2025  9:00 AM Margret Arriola MD [facility]      Contact information for follow-up providers       Bing Saavedra APRN .    Specialty: Nurse Practitioner  Contact information:  [address] [address]  [facility] [address] [phone]                       Contact information for after-discharge care       Destination       [facility] .    Service: Skilled Nursing  Contact information:  [address] [facility]  [place] [address]  [phone]                                   Time Spent on Discharge:  Greater than 30 minutes      Yoan Gutierrez MD  [facility]  04/09/25  12:37 EDT                Electronically signed by Yoan Gutierrez MD at 04/09/25 5926

## 2025-04-09 NOTE — PLAN OF CARE
Goal Outcome Evaluation:  Plan of Care Reviewed With: patient        Progress: no change  Outcome Evaluation: Pt is a 69 year old female adm with CKD with volume overload. PMHx significant for hypothyroidism, PAD, CKD stg 3, HTN. Pt with recent adm and d/c to [facility] 2/11, was at home approx. 1 week before readmission. Pt reports increased use of WC within that time frame. Pt this date was able to stand and take few steps to/from chair with CGA-Min A with RW. Pt with concerns for strength, activity tolerance, balance impacting independence and increasing risk of falls. IPOT to continue to follow with recommendations for [facility] at discharge pending continued progress.    Anticipated Discharge Disposition (OT): skilled nursing facility

## 2025-04-16 PROBLEM — R29.6 FREQUENT FALLS: Status: ACTIVE | Noted: 2025-04-16

## 2025-05-20 ENCOUNTER — TELEPHONE (OUTPATIENT)
Dept: INTERNAL MEDICINE | Facility: CLINIC | Age: 70
End: 2025-05-20

## 2025-05-20 NOTE — TELEPHONE ENCOUNTER
Caller: ARNAUD WITH [facility]    Relationship:     Best call back number: [phone]     What orders are you requesting VERBAL ORDERS     Additional notes: PROVIDER IS NEEDING VERBAL ORDERS TO COMPLETE HOME HEALTH EVALUATION.

## 2025-06-02 DIAGNOSIS — I73.9 PERIPHERAL ARTERIAL DISEASE: ICD-10-CM

## 2025-06-02 DIAGNOSIS — I70.1 RENAL ARTERY STENOSIS: ICD-10-CM

## 2025-06-02 DIAGNOSIS — I10 PRIMARY HYPERTENSION: ICD-10-CM

## 2025-06-02 NOTE — TELEPHONE ENCOUNTER
Caller: Rika Millan    Relationship: Self    Best call back number: [phone]     Requested Prescriptions:   Requested Prescriptions     Pending Prescriptions Disp Refills    metoprolol succinate XL (TOPROL-XL) 25 MG 24 hr tablet 90 tablet 1     Sig: Take 1 tablet by mouth Daily.    atorvastatin (LIPITOR) 20 MG tablet 90 tablet 1     Sig: Take 1 tablet by mouth Daily.    levothyroxine (SYNTHROID, LEVOTHROID) 100 MCG tablet 30 tablet 0     Sig: Take 1 tablet by mouth Every Morning.        Pharmacy where request should be sent: [facility] [id number] AT [facility] - [phone]  - [phone] FX     Last office visit with prescribing clinician: 3/27/2025   Last telemedicine visit with prescribing clinician: Visit date not found   Next office visit with prescribing clinician: 6/27/2025     Additional details provided by patient:     Does the patient have less than a 3 day supply:  [x] Yes  [] No    Would you like a call back once the refill request has been completed: [] Yes [x] No    If the office needs to give you a call back, can they leave a voicemail: [x] Yes [] No    Yohannes Bernardo   06/02/25 11:46 EDT

## 2025-06-03 NOTE — TELEPHONE ENCOUNTER
Caller: Rika Millan    Relationship: Self    Best call back number: [phone]    Who are you requesting to speak with (clinical staff, provider,  specific staff member): CLINICAL STAFF    What was the call regarding: STATED THAT THEY ARE OUT OF THE METOPROLOL AND ARE WANTING TO GET A CALL WHEN THE PRESCRIPTIONS HAVE BEEN SENT. PLEASE CALL AND ADVISE

## 2025-06-04 RX ORDER — LEVOTHYROXINE SODIUM 100 UG/1
100 TABLET ORAL
Qty: 30 TABLET | Refills: 0 | Status: SHIPPED | OUTPATIENT
Start: 2025-06-04

## 2025-06-04 RX ORDER — ATORVASTATIN CALCIUM 20 MG/1
20 TABLET, FILM COATED ORAL DAILY
Qty: 90 TABLET | Refills: 0 | Status: SHIPPED | OUTPATIENT
Start: 2025-06-04

## 2025-06-04 RX ORDER — METOPROLOL SUCCINATE 25 MG/1
25 TABLET, EXTENDED RELEASE ORAL
Qty: 90 TABLET | Refills: 0 | Status: SHIPPED | OUTPATIENT
Start: 2025-06-04

## 2025-06-16 ENCOUNTER — TELEPHONE (OUTPATIENT)
Dept: INTERNAL MEDICINE | Facility: CLINIC | Age: 70
End: 2025-06-16
Payer: MEDICARE

## 2025-06-16 NOTE — TELEPHONE ENCOUNTER
Caller: Rika Millan    Relationship: Self    Best call back number: [phone]      What was the call regarding: PATIENT HAS [facility] ON 7/3/25 WANTED TO CHECK TO SEE IF SHE NEEDS LABS COMPLETED BEFORE THEN     PLEASE GIVE CALLBACK

## 2025-06-17 DIAGNOSIS — R73.9 BLOOD GLUCOSE ELEVATED: ICD-10-CM

## 2025-06-17 DIAGNOSIS — D64.9 ANEMIA, UNSPECIFIED TYPE: ICD-10-CM

## 2025-06-17 DIAGNOSIS — I10 HYPERTENSION, UNSPECIFIED TYPE: ICD-10-CM

## 2025-06-17 DIAGNOSIS — E03.9 HYPOTHYROIDISM, UNSPECIFIED TYPE: Primary | ICD-10-CM

## 2025-06-17 DIAGNOSIS — N18.31 STAGE 3A CHRONIC KIDNEY DISEASE: ICD-10-CM

## 2025-06-20 ENCOUNTER — TELEPHONE (OUTPATIENT)
Dept: INTERNAL MEDICINE | Facility: CLINIC | Age: 70
End: 2025-06-20
Payer: MEDICARE

## 2025-06-20 NOTE — TELEPHONE ENCOUNTER
Caller: [facility]    Relationship: Home Health    Best call back number: [phone]    What orders are you requesting (i.e. lab or imaging): PRN ORDER TO WRAP HER LEG    In what timeframe would the patient need to come in: ASAP     Where will you receive your lab/imaging services: IN HER HOME     Additional notes: PLEASE CALL WITH VERBAL

## 2025-06-26 ENCOUNTER — TELEPHONE (OUTPATIENT)
Dept: INTERNAL MEDICINE | Facility: CLINIC | Age: 70
End: 2025-06-26

## 2025-07-03 ENCOUNTER — OFFICE VISIT (OUTPATIENT)
Dept: INTERNAL MEDICINE | Facility: CLINIC | Age: 70
End: 2025-07-03
Payer: MEDICARE

## 2025-07-03 VITALS
TEMPERATURE: 97.4 F | WEIGHT: 193.6 LBS | SYSTOLIC BLOOD PRESSURE: 116 MMHG | OXYGEN SATURATION: 96 % | DIASTOLIC BLOOD PRESSURE: 74 MMHG | HEIGHT: 63 IN | HEART RATE: 69 BPM | BODY MASS INDEX: 34.3 KG/M2

## 2025-07-03 DIAGNOSIS — E03.9 HYPOTHYROIDISM, UNSPECIFIED TYPE: Chronic | ICD-10-CM

## 2025-07-03 DIAGNOSIS — Z91.81 AT HIGH RISK FOR FALLS: ICD-10-CM

## 2025-07-03 DIAGNOSIS — E11.22 TYPE 2 DIABETES MELLITUS WITH STAGE 3B CHRONIC KIDNEY DISEASE, WITHOUT LONG-TERM CURRENT USE OF INSULIN: Chronic | ICD-10-CM

## 2025-07-03 DIAGNOSIS — I10 PRIMARY HYPERTENSION: Chronic | ICD-10-CM

## 2025-07-03 DIAGNOSIS — N18.32 STAGE 3B CHRONIC KIDNEY DISEASE: Chronic | ICD-10-CM

## 2025-07-03 DIAGNOSIS — Z28.20 VACCINE REFUSED BY PATIENT: ICD-10-CM

## 2025-07-03 DIAGNOSIS — Z00.00 MEDICARE ANNUAL WELLNESS VISIT, SUBSEQUENT: Primary | ICD-10-CM

## 2025-07-03 DIAGNOSIS — N18.32 TYPE 2 DIABETES MELLITUS WITH STAGE 3B CHRONIC KIDNEY DISEASE, WITHOUT LONG-TERM CURRENT USE OF INSULIN: Chronic | ICD-10-CM

## 2025-07-03 PROCEDURE — G2211 COMPLEX E/M VISIT ADD ON: HCPCS

## 2025-07-03 PROCEDURE — 3074F SYST BP LT 130 MM HG: CPT

## 2025-07-03 PROCEDURE — 1126F AMNT PAIN NOTED NONE PRSNT: CPT

## 2025-07-03 PROCEDURE — 3044F HG A1C LEVEL LT 7.0%: CPT

## 2025-07-03 PROCEDURE — 99214 OFFICE O/P EST MOD 30 MIN: CPT

## 2025-07-03 PROCEDURE — 3078F DIAST BP <80 MM HG: CPT

## 2025-07-03 PROCEDURE — G0439 PPPS, SUBSEQ VISIT: HCPCS

## 2025-07-03 RX ORDER — LEVOTHYROXINE SODIUM 100 UG/1
100 TABLET ORAL
Qty: 90 TABLET | Refills: 1 | Status: SHIPPED | OUTPATIENT
Start: 2025-07-03

## 2025-07-03 NOTE — PROGRESS NOTES
Subjective   The ABCs of the Annual Wellness Visit  Medicare Wellness Visit      Rika Millan is a 69 y.o. patient who presents for a Medicare Wellness Visit.    The following portions of the patient's history were reviewed and   updated as appropriate: allergies, current medications, past family history, past medical history, past social history, past surgical history, and problem list.    Compared to one year ago, the patient's physical   health is the same.  Compared to one year ago, the patient's mental   health is the same.    Recent Hospitalizations:  This patient has had a [facility] admission record on file within the last 365 days.  Current Medical Providers:  Patient Care Team:  Bing Saavedra APRN as PCP - General (Nurse Practitioner)    Outpatient Medications Prior to Visit   Medication Sig Dispense Refill    acetaminophen (TYLENOL) 325 MG tablet Take 2 tablets by mouth Every 6 (Six) Hours As Needed for Mild Pain.      aspirin 81 MG EC tablet Take 1 tablet by mouth Daily. 90 tablet 0    atorvastatin (LIPITOR) 20 MG tablet Take 1 tablet by mouth Daily. 90 tablet 0    levothyroxine (SYNTHROID, LEVOTHROID) 100 MCG tablet Take 1 tablet by mouth Every Morning. (Patient taking differently: Take 1 tablet by mouth Every Morning. Pt taking 125 mcg tablet) 30 tablet 0    metoprolol succinate XL (TOPROL-XL) 25 MG 24 hr tablet Take 1 tablet by mouth Daily. 90 tablet 0    polyethylene glycol (MIRALAX) 17 g packet Take 17 g by mouth 2 (Two) Times a Day As Needed (As needed for constipation).      spironolactone (ALDACTONE) 25 MG tablet Take 1 tablet by mouth Daily. 30 tablet 0    torsemide 40 MG tablet Take 40 mg by mouth Daily. (Patient taking differently: Take 40 mg by mouth Daily. Pt taking 20mg tablets) 30 tablet 0    oxybutynin XL (DITROPAN-XL) 10 MG 24 hr tablet Take 1 tablet by mouth Daily. (Patient not taking: Reported on 7/3/2025) 30 tablet 0     No facility-administered medications prior  "to visit.     No opioid medication identified on active medication list. I have reviewed chart for other potential  high risk medication/s and harmful drug interactions in the elderly.      Aspirin is on active medication list. Aspirin use is not indicated based on review of current medical condition/s. Risk of harm outweighs potential benefits. Patient instructed to discontinue this medication.  .      Patient Active Problem List   Diagnosis    JUSTIN (acute kidney injury)    Weakness    Hypothyroidism    Hypertension    Lower extremity edema    Elevated troponin    Obesity (BMI 30-39.9)    Class 2 severe obesity with serious comorbidity in adult    Stage 3a chronic kidney disease    Urine incontinence    Leg swelling    Renal artery stenosis    Peripheral arterial disease    Frequent falls     Advance Care Planning Advance Directive is not on file.  ACP discussion was held with the patient during this visit. Patient does not have an advance directive, information provided.            Objective   Vitals:    07/03/25 1431   BP: 116/74   BP Location: Left arm   Patient Position: Sitting   Cuff Size: Large Adult   Pulse: 69   Temp: 97.4 °F (36.3 °C)   TempSrc: Oral   SpO2: 96%   Weight: 87.8 kg (193 lb 9.6 oz)   Height: 160 cm (63\")   PainSc: 0-No pain       Estimated body mass index is 34.29 kg/m² as calculated from the following:    Height as of this encounter: 160 cm (63\").    Weight as of this encounter: 87.8 kg (193 lb 9.6 oz).    BMI is >= 30 and <35. (Class 1 Obesity). The following options were offered after discussion;: weight loss educational material (shared in after visit summary), exercise counseling/recommendations, and nutrition counseling/recommendations           Does the patient have evidence of cognitive impairment? No  Lab Results   Component Value Date    CHLPL 216 (H) 06/25/2025    TRIG 116 06/25/2025    HDL 58 06/25/2025     (H) 06/25/2025    VLDL 21 06/25/2025    HGBA1C 6.60 (H) 06/25/2025 "                                                                                                Health  Risk Assessment    Smoking Status:  Social History     Tobacco Use   Smoking Status Former    Current packs/day: 0.00    Average packs/day: 0.5 packs/day for 40.0 years (20.8 ttl pk-yrs)    Types: Cigarettes    Start date:     Quit date:     Years since quittin.5   Smokeless Tobacco Never   Tobacco Comments    4-5 sticks per day     Alcohol Consumption:  Social History     Substance and Sexual Activity   Alcohol Use Not Currently       Fall Risk Screen  STEADI Fall Risk Assessment was completed, and patient is at HIGH risk for falls. Assessment completed on:7/3/2025    Depression Screening   Little interest or pleasure in doing things? Not at all   Feeling down, depressed, or hopeless? Not at all   PHQ-2 Total Score 0      Health Habits and Functional and Cognitive Screenin/3/2025     2:28 PM   Functional & Cognitive Status   Do you have difficulty preparing food and eating? No   Do you have difficulty bathing yourself, getting dressed or grooming yourself? No   Do you have difficulty using the toilet? No   Do you have difficulty moving around from place to place? No   Do you have trouble with steps or getting out of a bed or a chair? No   Current Diet Well Balanced Diet   Dental Exam Up to date   Eye Exam Up to date   Exercise (times per week) 7 times per week   Current Exercises Include Walking   Do you need help using the phone?  No   Are you deaf or do you have serious difficulty hearing?  No   Do you need help to go to places out of walking distance? Yes   Do you need help shopping? No   Do you need help preparing meals?  No   Do you need help with housework?  Yes   Do you need help with laundry? No   Do you need help taking your medications? No   Do you need help managing money? No   Do you ever drive or ride in a car without wearing a seat belt? No   Have you felt unusual fatigue (could  be tiredness), stress, anger or loneliness in the last month? No   Who do you live with? Spouse   If you need help, do you have trouble finding someone available to you? No   Have you been bothered in the last four weeks by sexual problems? No   Do you have difficulty concentrating, remembering or making decisions? No           Age-appropriate Screening Schedule:  Refer to the list below for future screening recommendations based on patient's age, sex and/or medical conditions. Orders for these recommended tests are listed in the plan section. The patient has been provided with a written plan.    Health Maintenance List  Health Maintenance   Topic Date Due    DXA SCAN  Never done    TDAP/TD VACCINES (1 - Tdap) Never done    MAMMOGRAM  Never done    COLORECTAL CANCER SCREENING  Never done    Pneumococcal Vaccine 50+ (1 of 1 - PCV) Never done    ZOSTER VACCINE (1 of 2) Never done    LUNG CANCER SCREENING  Never done    COVID-19 Vaccine (4 - 2024-25 season) 09/01/2024    HEPATITIS C SCREENING  Never done    ANNUAL WELLNESS VISIT  Never done    INFLUENZA VACCINE  07/01/2025    LIPID PANEL  06/25/2026                                                                                                                                                CMS Preventative Services Quick Reference  Risk Factors Identified During Encounter  Fall Risk-High or Moderate: Discussed Fall Prevention in the home, Information on Fall Prevention Shared in After Visit Summary, and Sit to Stand Exercise Information Shared in After Visit Summary    The above risks/problems have been discussed with the patient.  Pertinent information has been shared with the patient in the After Visit Summary.  An After Visit Summary and PPPS were made available to the patient.    Follow Up:   Next Medicare Wellness visit to be scheduled in 1 year.         Additional E&M Note during same encounter follows:  Patient has additional, significant, and separately  identifiable condition(s)/problem(s) that require work above and beyond the Medicare Wellness Visit     Chief Complaint  Medicare Wellness-subsequent    Subjective   HPI                    History of Present Illness  The patient presents for a Medicare wellness visit.    She has been diagnosed with stage 3 renal failure and is under the care of a nephrologist, with a follow-up appointment scheduled in 2 weeks. She reports that the artery from her heart to her kidney is not functioning optimally, which is affecting her kidney function. She has been advised to limit her fluid intake to 50 ounces per day and adhere to a low-sodium diet, which she is attempting to follow.    She was recently informed of a diabetes diagnosis, which was previously unknown to her. She is making efforts to reduce her sugar intake.    She has a prescription for metoprolol for blood pressure management but discontinued its use due to low blood pressure readings during nursing and physical therapy visits. She has resumed taking it as of today.    She is currently on thyroid medication, which she reports is effective. She was previously on a 125 mcg dose while in rehab but is unsure of her current dosage.    She reports no issues with her physical or mental health. She is not currently taking aspirin, although she believes she should be. She does not have a living will or advanced directive in place. She experiences difficulty with walking and housework due to leg issues. She uses a wheelchair for long distances and a rollator walker for shorter distances. She declines mammogram, DEXA scan, and colonoscopy screenings. Her last eye exam was in 05/2025. She declines lung cancer screening. She is interested in receiving the shingles vaccine.    SOCIAL HISTORY  The patient quit smoking about 4 months ago.    Objective   Vital Signs:  /74 (BP Location: Left arm, Patient Position: Sitting, Cuff Size: Large Adult)   Pulse 69   Temp 97.4 °F  "(36.3 °C) (Oral)   Ht 160 cm (63\")   Wt 87.8 kg (193 lb 9.6 oz)   SpO2 96%   BMI 34.29 kg/m²   Physical Exam  Vitals reviewed.   Constitutional:       Comments: Odorous    HENT:      Head: Normocephalic.   Cardiovascular:      Rate and Rhythm: Normal rate and regular rhythm.      Pulses: Normal pulses.      Heart sounds: Normal heart sounds.   Pulmonary:      Effort: Pulmonary effort is normal.      Breath sounds: Normal breath sounds.   Musculoskeletal:         General: Swelling (bilat legs) present.      Right lower leg: Edema (2+ pitting) present.      Left lower leg: Edema (2+pitting) present.   Skin:     General: Skin is warm and dry.      Capillary Refill: Capillary refill takes less than 2 seconds.   Neurological:      General: No focal deficit present.      Mental Status: She is alert.      Motor: Weakness present.      Gait: Gait abnormal (in wheelchair).   Psychiatric:         Mood and Affect: Mood normal.         Behavior: Behavior normal.                        Assessment and Plan      Type 2 diabetes mellitus with stage 3b chronic kidney disease, without long-term current use of insulin      Orders:    Semaglutide,0.25 or 0.5MG/DOS, (OZEMPIC) 2 MG/1.5ML solution pen-injector; Inject 0.25 mg under the skin into the appropriate area as directed 1 (One) Time Per Week.    Stage 3b chronic kidney disease      Orders:    Semaglutide,0.25 or 0.5MG/DOS, (OZEMPIC) 2 MG/1.5ML solution pen-injector; Inject 0.25 mg under the skin into the appropriate area as directed 1 (One) Time Per Week.    Vaccine refused by patient         Hypothyroidism, unspecified type         Primary hypertension           Medicare annual wellness visit, subsequent         At high risk for falls                 Assessment & Plan  1. Stage 3 renal failure.  - Kidney function is stable but requires ongoing monitoring.  - Will continue to follow up with nephrology in 2 weeks.  - Advised to adhere to a low sodium diet and limit fluid intake " to 50 ounces per day.  - Kidney labs are on the lower side but improved from previous results.    2. Diabetes mellitus.  - A1c is 6.6, indicating diabetes.  - Ozempic once a week will be initiated at 0.25 mg for 4 weeks, then increased to 0.5 mg if tolerated.  - Potential side effects, including constipation, nausea, reflux, and rare risk of pancreatitis, were discussed.  - Prescription will be sent to pharmacy. She will monitor her feet for any wounds and report immediately if any develop.    3. Blood pressure management.  - Blood pressure is currently well-controlled.  - Will continue to monitor blood pressure and avoid taking metoprolol if readings are consistently in the 110s/60s or 70s.  - Discussed with her brother's cardiologist about the benefits and potential dizziness from metoprolol.  - Advised to continue monitoring and adjust medication as needed.    4. Thyroid management.  - Thyroid function is stable.  - Prescription for thyroid medication will be refilled at 100 mcg.  - Will check current dosage at home and call if it differs from the prescribed dose.  - Previous lab results from 06/25/2025 confirmed thyroid levels are within normal range.    5. Medicare wellness visit.  - Total cholesterol and LDL levels are slightly elevated, but HDL is within normal range.  - CBC, hemoglobin, and hematocrit levels are all satisfactory.  - Advised to receive the shingles vaccine at her pharmacy, with a second dose to be administered 3 months after the first.  - Will have blood work done prior to her next visit in 3 months.    Follow-up  The patient will follow up in 3 months.        Follow Up   No follow-ups on file.  Patient was given instructions and counseling regarding her condition or for health maintenance advice. Please see specific information pulled into the AVS if appropriate.    Patient or patient representative verbalized consent for the use of Ambient Listening during the visit with  Bing Lima  ROBIN Saavedra for chart documentation. 7/21/2025  10:15 EDT

## 2025-07-10 ENCOUNTER — TELEPHONE (OUTPATIENT)
Dept: INTERNAL MEDICINE | Facility: CLINIC | Age: 70
End: 2025-07-10
Payer: MEDICARE

## 2025-07-10 NOTE — TELEPHONE ENCOUNTER
"  [id number] - Slime from [employer].   \"Patient needs glucometer, lancets, and strips. She has been diagnosed with Diabetes and does not have the supplies. She has a blister on her left lower extremity and would like wound orders placed.\"   "

## 2025-07-15 DIAGNOSIS — E11.22 TYPE 2 DIABETES MELLITUS WITH STAGE 3B CHRONIC KIDNEY DISEASE, WITHOUT LONG-TERM CURRENT USE OF INSULIN: ICD-10-CM

## 2025-07-15 DIAGNOSIS — S81.802A WOUND OF LEFT LOWER EXTREMITY, INITIAL ENCOUNTER: Primary | ICD-10-CM

## 2025-07-15 DIAGNOSIS — N18.32 TYPE 2 DIABETES MELLITUS WITH STAGE 3B CHRONIC KIDNEY DISEASE, WITHOUT LONG-TERM CURRENT USE OF INSULIN: ICD-10-CM

## 2025-07-15 RX ORDER — BLOOD-GLUCOSE METER
1 KIT MISCELLANEOUS DAILY
Qty: 1 EACH | Refills: 0 | Status: SHIPPED | OUTPATIENT
Start: 2025-07-15

## 2025-07-15 RX ORDER — LANCETS 28 GAUGE
1 EACH MISCELLANEOUS DAILY
Qty: 100 EACH | Refills: 12 | Status: SHIPPED | OUTPATIENT
Start: 2025-07-15

## 2025-07-21 ENCOUNTER — TELEPHONE (OUTPATIENT)
Dept: INTERNAL MEDICINE | Facility: CLINIC | Age: 70
End: 2025-07-21
Payer: MEDICARE

## 2025-07-21 ENCOUNTER — TRANSCRIBE ORDERS (OUTPATIENT)
Dept: PHYSICAL THERAPY | Facility: HOSPITAL | Age: 70
End: 2025-07-21
Payer: MEDICARE

## 2025-07-21 DIAGNOSIS — I89.0 LYMPHEDEMA: Primary | ICD-10-CM

## 2025-07-21 NOTE — TELEPHONE ENCOUNTER
Talita Abel called office regarding Rika Millan she had recert evaluation today and [facility]

## 2025-07-21 NOTE — PATIENT INSTRUCTIONS
Advance Care Planning and Advance Directives     You make decisions on a daily basis - decisions about where you want to live, your career, your home, your life. Perhaps one of the most important decisions you face is your choice for future medical care. Take time to talk with your family and your healthcare team and start planning today.  Advance Care Planning is a process that can help you:  Understand possible future healthcare decisions in light of your own experiences  Reflect on those decision in light of your goals and values  Discuss your decisions with those closest to you and the healthcare professionals that care for you  Make a plan by creating a document that reflects your wishes    Surrogate Decision Maker  In the event of a medical emergency, which has left you unable to communicate or to make your own decisions, you would need someone to make decisions for you.  It is important to discuss your preferences for medical treatment with this person while you are in good health.     Qualities of a surrogate decision maker:  Willing to take on this role and responsibility  Knows what you want for future medical care  Willing to follow your wishes even if they don't agree with them  Able to make difficult medical decisions under stressful circumstances    Advance Directives  These are legal documents you can create that will guide your healthcare team and decision maker(s) when needed. These documents can be stored in the electronic medical record.    Living Will - a legal document to guide your care if you have a terminal condition or a serious illness and are unable to communicate. States vary by statute in document names/types, but most forms may include one or more of the following:        -  Directions regarding life-prolonging treatments        -  Directions regarding artificially provided nutrition/hydration        -  Choosing a healthcare decision maker        -  Direction regarding organ/tissue  donation    Durable Power of  for Healthcare - this document names an -in-fact to make medical decisions for you, but it may also allow this person to make personal and financial decisions for you. Please seek the advice of an  if you need this type of document.    **Advance Directives are not required and no one may discriminate against you if you do not sign one.    Medical Orders  Many states allow specific forms/orders signed by your physician to record your wishes for medical treatment in your current state of health. This form, signed in personal communication with your physician, addresses resuscitation and other medical interventions that you may or may not want.      For more information or to schedule a time with a [facility] Advance Care Planning Facilitator contact: [facility] or call [phone] and someone will contact you directly.  Fall Prevention in the Home, Adult  Falls can cause injuries and affect people of all ages. There are many simple things that you can do to make your home safe and to help prevent falls.  If you need it, ask for help making these changes.  What actions can I take to prevent falls?  General information  Use good lighting in all rooms. Make sure to:  Replace any light bulbs that burn out.  Turn on lights if it is dark and use night-lights.  Keep items that you use often in easy-to-reach places. Lower the shelves around your home if needed.  Move furniture so that there are clear paths around it.  Do not keep throw rugs or other things on the floor that can make you trip.  If any of your floors are uneven, fix them.  Add color or contrast paint or tape to clearly delonte and help you see:  Grab bars or handrails.  First and last steps of staircases.  Where the edge of each step is.  If you use a ladder or stepladder:  Make sure that it is fully opened. Do not climb a closed ladder.  Make sure the sides of the ladder are locked in  place.  Have someone hold the ladder while you use it.  Know where your pets are as you move through your home.  What can I do in the bathroom?         Keep the floor dry. Clean up any water that is on the floor right away.  Remove soap buildup in the bathtub or shower. Buildup makes bathtubs and showers slippery.  Use non-skid mats or decals on the floor of the bathtub or shower.  Attach bath mats securely with double-sided, non-slip rug tape.  If you need to sit down while you are in the shower, use a non-slip stool.  Install grab bars by the toilet and in the bathtub and shower. Do not use towel bars as grab bars.  What can I do in the bedroom?  Make sure that you have a light by your bed that is easy to reach.  Do not use any sheets or blankets on your bed that hang to the floor.  Have a firm bench or chair with side arms that you can use for support when you get dressed.  What can I do in the kitchen?  Clean up any spills right away.  If you need to reach something above you, use a sturdy step stool that has a grab bar.  Keep electrical cables out of the way.  Do not use floor polish or wax that makes floors slippery.  What can I do with my stairs?  Do not leave anything on the stairs.  Make sure that you have a light switch at the top and the bottom of the stairs. Have them installed if you do not have them.  Make sure that there are handrails on both sides of the stairs. Fix handrails that are broken or loose. Make sure that handrails are as long as the staircases.  Install non-slip stair treads on all stairs in your home if they do not have carpet.  Avoid having throw rugs at the top or bottom of stairs, or secure the rugs with carpet tape to prevent them from moving.  Choose a carpet design that does not hide the edge of steps on the stairs. Make sure that carpet is firmly attached to the stairs. Fix any carpet that is loose or worn.  What can I do on the outside of my home?  Use bright outdoor  lighting.  Repair the edges of walkways and driveways and fix any cracks. Clear paths of anything that can make you trip, such as tools or rocks.  Add color or contrast paint or tape to clearly delonte and help you see high doorway thresholds.  Trim any bushes or trees on the main path into your home.  Check that handrails are securely fastened and in good repair. Both sides of all steps should have handrails.  Install guardrails along the edges of any raised decks or porches.  Have leaves, snow, and ice cleared regularly. Use sand, salt, or ice melt on walkways during winter months if you live where there is ice and snow.  In the garage, clean up any spills right away, including grease or oil spills.  What other actions can I take?  Review your medicines with your health care provider. Some medicines can make you confused or feel dizzy. This can increase your chance of falling.  Wear closed-toe shoes that fit well and support your feet. Wear shoes that have rubber soles and low heels.  Use a cane, walker, scooter, or crutches that help you move around if needed.  Talk with your provider about other ways that you can decrease your risk of falls. This may include seeing a physical therapist to learn to do exercises to improve movement and strength.  Where to find more information  Centers for Disease Control and PreventionMARCO: cdc.gov  National [place] on Aging: mendez.nih.gov  National [place] on Aging: mendez.nih.gov  Contact a health care provider if:  You are afraid of falling at home.  You feel weak, drowsy, or dizzy at home.  You fall at home.  Get help right away if you:  Lose consciousness or have trouble moving after a fall.  Have a fall that causes a head injury.  These symptoms may be an emergency. Get help right away. Call 911.  Do not wait to see if the symptoms will go away.  Do not drive yourself to the hospital.  This information is not intended to replace advice given to you by your health care  provider. Make sure you discuss any questions you have with your health care provider.  Document Revised: 08/21/2023 Document Reviewed: 08/21/2023  Elsevier Patient Education © 2024 [employer]  Sit-to-Stand Exercise    The sit-to-stand exercise (also known as the chair stand or chair rise exercise) strengthens your lower body and helps you maintain or improve your mobility and independence. The end goal is to do the sit-to-stand exercise without using your hands. This will be easier as you become stronger. You should always talk with your health care provider before starting any exercise program, especially if you have had recent surgery.  Do the exercise exactly as told by your health care provider and adjust it as directed. It is normal to feel mild stretching, pulling, tightness, or discomfort as you do this exercise, but you should stop right away if you feel sudden pain or your pain gets worse. Do not begin doing this exercise until told by your health care provider.  What the sit-to-stand exercise does  The sit-to-stand exercise helps to strengthen the muscles in your thighs and the muscles in the center of your body that give you stability (core muscles). This exercise is especially helpful if:  You have had knee or hip surgery.  You have trouble getting up from a chair, out of a car, or off the toilet due to muscle weakness.  How to do the sit-to-stand exercise  Sit toward the front edge of a sturdy chair without armrests. Your knees should be bent and your feet should be flat on the floor and shoulder-width apart and underneath your hips.  Place your hands lightly on each side of the seat. Keep your back and neck as straight as possible, with your chest slightly forward.  Breathe in slowly. Lean forward and slightly shift your weight to the front of your feet.  Breathe out as you slowly stand up. Try not to support any weight with your hands.  Stand and pause for a full breath in and out.  Breathe in as  you sit down slowly. Tighten your core and abdominal muscles to control your lowering as much as possible. You should lower yourself back to the chair slowly, not just drop back into the seat.  Breathe out slowly.  Do this exercise 10-15 times. If needed, do it fewer times until you build up strength.  Rest for 1 minute, then do another set of 10-15 repetitions.  To change the difficulty of the sit-to-stand exercise  If the exercise is too difficult, use a chair with sturdy armrests, and push off the armrests to help you come to the standing position. You can also use the armrests to help slowly lower yourself back to sitting. As this gets easier, try to use your arms less. You can also place a firm cushion or pillow on the chair to make the surface higher.  If this exercise is too easy, do not use your arms to help raise or lower yourself. You can also wear a weighted vest, use hand weights, increase your repetitions, or try a lower chair.  General tips  You may feel tired when starting an exercise routine. This is normal.  You may have muscle soreness that lasts a few days. This is normal. As you get stronger, you may not feel muscle soreness.  Use smooth, steady movements.  Do not  hold your breath during strength exercises. This can cause unsafe changes in your blood pressure.  Breathe in slowly through your nose, and breathe out slowly through your mouth.  Summary  Strengthening your lower body is an important step to help you move safely and independently.  The sit-to-stand exercise helps strengthen the muscles in your thighs and core.  You should always talk with your health care provider before starting any exercise program, especially if you have had recent surgery.  This information is not intended to replace advice given to you by your health care provider. Make sure you discuss any questions you have with your health care provider.  Document Revised: 04/10/2022 Document Reviewed: 04/10/2022  Jessica  Patient Education 2024 [employer] Inc[employer]    Medicare Wellness  Personal Prevention Plan of Service     Date of Office Visit:    Encounter Provider:  ROBIN Cavanaugh  Place of Service:  [facility]  Patient Name: Rika Millan  :  1955    As part of the Medicare Wellness portion of your visit today, we are providing you with this personalized preventive plan of services (PPPS). This plan is based upon recommendations of the [place] Preventive Services Task Force (USPSTF) and the Advisory Committee on Immunization Practices (ACIP).    This lists the preventive care services that should be considered, and provides dates of when you are due. Items listed as completed are up-to-date and do not require any further intervention.    Health Maintenance   Topic Date Due   • DXA SCAN  Never done   • URINE MICROALBUMIN-CREATININE RATIO (uACR)  2025 (Originally 1965)   • Pneumococcal Vaccine 50+ (1 of 2 - PCV) 2025 (Originally 1973)   • TDAP/TD VACCINES (1 - Tdap) 2025 (Originally 1973)   • ZOSTER VACCINE (1 of 2) 2025 (Originally 2004)   • COLORECTAL CANCER SCREENING  2025 (Originally 1999)   • COVID-19 Vaccine ( season) 2026 (Originally 2024)   • HEPATITIS C SCREENING  2026 (Originally 3/24/2025)   • MAMMOGRAM  2026 (Originally 1994)   • LUNG CANCER SCREENING  2026 (Originally 2004)   • INFLUENZA VACCINE  10/01/2025   • HEMOGLOBIN A1C  2025   • DIABETIC EYE EXAM  2026   • LIPID PANEL  2026   • ANNUAL WELLNESS VISIT  2026   • DIABETIC FOOT EXAM  2026       No orders of the defined types were placed in this encounter.      Return in about 3 months (around 10/3/2025) for Recheck.

## 2025-08-04 DIAGNOSIS — N18.32 TYPE 2 DIABETES MELLITUS WITH STAGE 3B CHRONIC KIDNEY DISEASE, WITHOUT LONG-TERM CURRENT USE OF INSULIN: Primary | ICD-10-CM

## 2025-08-04 DIAGNOSIS — E11.22 TYPE 2 DIABETES MELLITUS WITH STAGE 3B CHRONIC KIDNEY DISEASE, WITHOUT LONG-TERM CURRENT USE OF INSULIN: Primary | ICD-10-CM

## 2025-08-08 ENCOUNTER — HOSPITAL ENCOUNTER (OUTPATIENT)
Dept: PHYSICAL THERAPY | Facility: HOSPITAL | Age: 70
Setting detail: THERAPIES SERIES
Discharge: HOME | End: 2025-08-08
Payer: MEDICARE

## 2025-08-08 DIAGNOSIS — I89.0 LYMPHEDEMA: Primary | ICD-10-CM

## 2025-08-08 PROCEDURE — 97535 SELF CARE MNGMENT TRAINING: CPT

## 2025-08-08 PROCEDURE — 97162 PT EVAL MOD COMPLEX 30 MIN: CPT

## 2025-08-11 ENCOUNTER — TELEPHONE (OUTPATIENT)
Age: 70
End: 2025-08-11
Payer: MEDICARE

## 2025-08-11 ENCOUNTER — TELEPHONE (OUTPATIENT)
Dept: INTERNAL MEDICINE | Facility: CLINIC | Age: 70
End: 2025-08-11
Payer: MEDICARE

## 2025-08-25 ENCOUNTER — HOSPITAL ENCOUNTER (OUTPATIENT)
Dept: PHYSICAL THERAPY | Facility: HOSPITAL | Age: 70
Setting detail: THERAPIES SERIES
Discharge: HOME | End: 2025-08-25
Payer: MEDICARE

## 2025-08-25 DIAGNOSIS — I89.0 LYMPHEDEMA: Primary | ICD-10-CM

## 2025-08-25 PROCEDURE — 97140 MANUAL THERAPY 1/> REGIONS: CPT

## 2025-08-29 ENCOUNTER — HOSPITAL ENCOUNTER (OUTPATIENT)
Dept: PHYSICAL THERAPY | Facility: HOSPITAL | Age: 70
Setting detail: THERAPIES SERIES
Discharge: HOME | End: 2025-08-29
Payer: MEDICARE

## 2025-08-29 DIAGNOSIS — I89.0 LYMPHEDEMA: Primary | ICD-10-CM

## 2025-08-29 PROCEDURE — 97535 SELF CARE MNGMENT TRAINING: CPT

## 2025-08-29 PROCEDURE — 97140 MANUAL THERAPY 1/> REGIONS: CPT
